# Patient Record
Sex: MALE | Race: WHITE | Employment: OTHER
[De-identification: names, ages, dates, MRNs, and addresses within clinical notes are randomized per-mention and may not be internally consistent; named-entity substitution may affect disease eponyms.]

---

## 2017-01-26 PROBLEM — C61 PROSTATE CANCER (HCC): Status: ACTIVE | Noted: 2017-01-26

## 2017-01-30 ENCOUNTER — TELEPHONE (OUTPATIENT)
Dept: FAMILY MEDICINE CLINIC | Facility: CLINIC | Age: 62
End: 2017-01-30

## 2017-03-10 RX ORDER — OXYCODONE HYDROCHLORIDE AND ACETAMINOPHEN 5; 325 MG/1; MG/1
TABLET ORAL
Qty: 20 TABLET | Refills: 0 | Status: SHIPPED | OUTPATIENT
Start: 2017-03-10 | End: 2017-06-30

## 2017-06-30 ENCOUNTER — OFFICE VISIT (OUTPATIENT)
Dept: FAMILY MEDICINE CLINIC | Age: 62
End: 2017-06-30
Payer: COMMERCIAL

## 2017-06-30 VITALS
SYSTOLIC BLOOD PRESSURE: 150 MMHG | DIASTOLIC BLOOD PRESSURE: 84 MMHG | HEART RATE: 84 BPM | BODY MASS INDEX: 28.04 KG/M2 | WEIGHT: 179 LBS

## 2017-06-30 DIAGNOSIS — M54.50 LOW BACK PAIN WITHOUT SCIATICA, UNSPECIFIED BACK PAIN LATERALITY, UNSPECIFIED CHRONICITY: ICD-10-CM

## 2017-06-30 DIAGNOSIS — R09.1 PLEURITIS: Primary | ICD-10-CM

## 2017-06-30 LAB
BILIRUBIN, POC: ABNORMAL
BLOOD URINE, POC: ABNORMAL
CLARITY, POC: ABNORMAL
COLOR, POC: YELLOW
GLUCOSE URINE, POC: ABNORMAL
KETONES, POC: ABNORMAL
LEUKOCYTE EST, POC: ABNORMAL
NITRITE, POC: ABNORMAL
PH, POC: 5
PROTEIN, POC: ABNORMAL
SPECIFIC GRAVITY, POC: >1.03
UROBILINOGEN, POC: 0.2

## 2017-06-30 PROCEDURE — 99213 OFFICE O/P EST LOW 20 MIN: CPT | Performed by: FAMILY MEDICINE

## 2017-06-30 PROCEDURE — 81003 URINALYSIS AUTO W/O SCOPE: CPT | Performed by: FAMILY MEDICINE

## 2017-06-30 RX ORDER — PREDNISONE 20 MG/1
TABLET ORAL
Qty: 20 TABLET | Refills: 0 | Status: SHIPPED | OUTPATIENT
Start: 2017-06-30 | End: 2018-02-05 | Stop reason: ALTCHOICE

## 2017-06-30 ASSESSMENT — ENCOUNTER SYMPTOMS
ABDOMINAL PAIN: 0
SHORTNESS OF BREATH: 0
BACK PAIN: 0
DIARRHEA: 0
WHEEZING: 0
CONSTIPATION: 0
BLOOD IN STOOL: 0
COUGH: 0

## 2017-06-30 ASSESSMENT — PATIENT HEALTH QUESTIONNAIRE - PHQ9
2. FEELING DOWN, DEPRESSED OR HOPELESS: 0
SUM OF ALL RESPONSES TO PHQ QUESTIONS 1-9: 0
1. LITTLE INTEREST OR PLEASURE IN DOING THINGS: 0
SUM OF ALL RESPONSES TO PHQ9 QUESTIONS 1 & 2: 0

## 2017-08-14 ENCOUNTER — TELEPHONE (OUTPATIENT)
Dept: FAMILY MEDICINE CLINIC | Age: 62
End: 2017-08-14

## 2017-08-16 ENCOUNTER — OFFICE VISIT (OUTPATIENT)
Dept: FAMILY MEDICINE CLINIC | Age: 62
End: 2017-08-16
Payer: COMMERCIAL

## 2017-08-16 VITALS
BODY MASS INDEX: 28.35 KG/M2 | WEIGHT: 181 LBS | DIASTOLIC BLOOD PRESSURE: 90 MMHG | SYSTOLIC BLOOD PRESSURE: 128 MMHG | HEART RATE: 88 BPM

## 2017-08-16 DIAGNOSIS — Z12.11 COLON CANCER SCREENING: ICD-10-CM

## 2017-08-16 DIAGNOSIS — F32.A DEPRESSION, UNSPECIFIED DEPRESSION TYPE: ICD-10-CM

## 2017-08-16 DIAGNOSIS — I10 ESSENTIAL HYPERTENSION: Primary | ICD-10-CM

## 2017-08-16 PROCEDURE — 99213 OFFICE O/P EST LOW 20 MIN: CPT | Performed by: FAMILY MEDICINE

## 2017-08-16 RX ORDER — ESCITALOPRAM OXALATE 10 MG/1
10 TABLET, FILM COATED ORAL DAILY
Qty: 30 TABLET | Refills: 3 | Status: SHIPPED | OUTPATIENT
Start: 2017-08-16 | End: 2018-02-05 | Stop reason: SDUPTHER

## 2017-08-16 RX ORDER — OXYCODONE HYDROCHLORIDE AND ACETAMINOPHEN 5; 325 MG/1; MG/1
1 TABLET ORAL EVERY 6 HOURS PRN
Qty: 50 TABLET | Refills: 0 | Status: SHIPPED | OUTPATIENT
Start: 2017-08-16 | End: 2018-04-10 | Stop reason: SDUPTHER

## 2017-08-16 ASSESSMENT — ENCOUNTER SYMPTOMS
SHORTNESS OF BREATH: 0
ABDOMINAL PAIN: 0
BLOOD IN STOOL: 0
COUGH: 0
CONSTIPATION: 0
WHEEZING: 0
BACK PAIN: 1
DIARRHEA: 0

## 2017-08-18 RX ORDER — ALBUTEROL SULFATE 90 UG/1
AEROSOL, METERED RESPIRATORY (INHALATION)
Qty: 18 INHALER | Refills: 5 | Status: SHIPPED | OUTPATIENT
Start: 2017-08-18 | End: 2018-02-05 | Stop reason: SDUPTHER

## 2017-10-02 RX ORDER — OXYCODONE HYDROCHLORIDE AND ACETAMINOPHEN 5; 325 MG/1; MG/1
TABLET ORAL
Qty: 42 TABLET | Refills: 0 | Status: SHIPPED | OUTPATIENT
Start: 2017-10-02 | End: 2017-12-05 | Stop reason: SDUPTHER

## 2017-10-02 NOTE — TELEPHONE ENCOUNTER
Last refill 8- needs [facility]   Health Maintenance   Topic Date Due    HIV screen  01/17/1970    Diabetes screen  01/17/1995    Colon cancer screen colonoscopy  01/17/2005    Zostavax vaccine  01/17/2015    Flu vaccine (1) 09/01/2017    Lipid screen  08/11/2021    DTaP/Tdap/Td vaccine (2 - Td) 08/02/2026    Pneumococcal med risk  Completed       No results found for: LABA1C          ( goal A1C is < 7)   No results found for: LABMICR  LDL Cholesterol (mg/dL)   Date Value   08/11/2016 147 (H)       (goal LDL is <100)   AST (U/L)   Date Value   08/11/2016 19     ALT (U/L)   Date Value   08/11/2016 12     BUN (mg/dL)   Date Value   01/27/2017 12     BP Readings from Last 3 Encounters:   08/16/17 (!) 128/90   06/30/17 (!) 150/84   01/27/17 131/76          (goal 120/80)    All Future Testing planned in [facility]      Next Visit Date:  No future appointments.          Patient Active Problem List:     Allergic rhinitis     Hepatitis C     Dyslipidemia     Hypertension     Erectile dysfunction     Asthma     Hx of rotator cuff surgery     Depression     1/26/17 RALP

## 2017-12-05 RX ORDER — OXYCODONE HYDROCHLORIDE AND ACETAMINOPHEN 5; 325 MG/1; MG/1
TABLET ORAL
Qty: 42 TABLET | Refills: 0 | Status: SHIPPED | OUTPATIENT
Start: 2017-12-05 | End: 2018-02-05 | Stop reason: SDUPTHER

## 2017-12-05 NOTE — TELEPHONE ENCOUNTER
Last refill 8-   Health Maintenance   Topic Date Due    HIV screen  01/17/1970    Diabetes screen  01/17/1995    Colon cancer screen colonoscopy  01/17/2005    Smoker: low dose lung CT screening  01/17/2010    Zostavax vaccine  01/17/2015    Flu vaccine (1) 09/01/2017    Lipid screen  08/11/2021    DTaP/Tdap/Td vaccine (2 - Td) 08/02/2026    Pneumococcal med risk  Completed       No results found for: LABA1C          ( goal A1C is < 7)   No results found for: LABMICR  LDL Cholesterol (mg/dL)   Date Value   08/11/2016 147 (H)       (goal LDL is <100)   AST (U/L)   Date Value   08/11/2016 19     ALT (U/L)   Date Value   08/11/2016 12     BUN (mg/dL)   Date Value   01/27/2017 12     BP Readings from Last 3 Encounters:   08/16/17 (!) 128/90   06/30/17 (!) 150/84   01/27/17 131/76          (goal 120/80)    All Future Testing planned in [facility]      Next Visit Date:  No future appointments.          Patient Active Problem List:     Allergic rhinitis     Hepatitis C     Dyslipidemia     Hypertension     Erectile dysfunction     Asthma     Hx of rotator cuff surgery     Depression     1/26/17 RALP

## 2018-02-05 ENCOUNTER — OFFICE VISIT (OUTPATIENT)
Dept: FAMILY MEDICINE CLINIC | Age: 63
End: 2018-02-05
Payer: COMMERCIAL

## 2018-02-05 VITALS
SYSTOLIC BLOOD PRESSURE: 182 MMHG | WEIGHT: 189 LBS | HEIGHT: 67 IN | HEART RATE: 80 BPM | BODY MASS INDEX: 29.66 KG/M2 | DIASTOLIC BLOOD PRESSURE: 90 MMHG

## 2018-02-05 DIAGNOSIS — F32.A DEPRESSION, UNSPECIFIED DEPRESSION TYPE: ICD-10-CM

## 2018-02-05 DIAGNOSIS — R06.09 DYSPNEA ON EXERTION: Primary | ICD-10-CM

## 2018-02-05 DIAGNOSIS — J44.9 CHRONIC OBSTRUCTIVE PULMONARY DISEASE, UNSPECIFIED COPD TYPE (HCC): ICD-10-CM

## 2018-02-05 DIAGNOSIS — E78.5 DYSLIPIDEMIA: ICD-10-CM

## 2018-02-05 DIAGNOSIS — I10 ESSENTIAL HYPERTENSION: ICD-10-CM

## 2018-02-05 DIAGNOSIS — G89.4 CHRONIC PAIN SYNDROME: ICD-10-CM

## 2018-02-05 DIAGNOSIS — Z12.5 PROSTATE CANCER SCREENING: ICD-10-CM

## 2018-02-05 PROCEDURE — 99214 OFFICE O/P EST MOD 30 MIN: CPT | Performed by: FAMILY MEDICINE

## 2018-02-05 RX ORDER — ESCITALOPRAM OXALATE 10 MG/1
TABLET ORAL
Qty: 30 TABLET | Refills: 11 | Status: SHIPPED | OUTPATIENT
Start: 2018-02-05 | End: 2019-03-05 | Stop reason: SDUPTHER

## 2018-02-05 RX ORDER — PREDNISONE 20 MG/1
TABLET ORAL
Qty: 20 TABLET | Refills: 0 | Status: SHIPPED | OUTPATIENT
Start: 2018-02-05 | End: 2018-02-19 | Stop reason: ALTCHOICE

## 2018-02-05 RX ORDER — OXYCODONE HYDROCHLORIDE AND ACETAMINOPHEN 5; 325 MG/1; MG/1
TABLET ORAL
Qty: 60 TABLET | Refills: 0 | Status: SHIPPED | OUTPATIENT
Start: 2018-02-05 | End: 2018-03-08 | Stop reason: SDUPTHER

## 2018-02-05 RX ORDER — ALBUTEROL SULFATE 90 UG/1
AEROSOL, METERED RESPIRATORY (INHALATION)
Qty: 18 INHALER | Refills: 5 | Status: SHIPPED | OUTPATIENT
Start: 2018-02-05 | End: 2018-08-13 | Stop reason: SDUPTHER

## 2018-02-05 ASSESSMENT — ENCOUNTER SYMPTOMS
DIARRHEA: 0
ABDOMINAL PAIN: 0
CHEST TIGHTNESS: 1
CONSTIPATION: 0
WHEEZING: 1
SHORTNESS OF BREATH: 1
COUGH: 0
BACK PAIN: 0
BLOOD IN STOOL: 0

## 2018-02-14 ENCOUNTER — HOSPITAL ENCOUNTER (OUTPATIENT)
Dept: NON INVASIVE DIAGNOSTICS | Age: 63
Discharge: HOME | End: 2018-02-14
Payer: COMMERCIAL

## 2018-02-14 ENCOUNTER — HOSPITAL ENCOUNTER (OUTPATIENT)
Age: 63
Discharge: HOME | End: 2018-02-14
Payer: COMMERCIAL

## 2018-02-14 DIAGNOSIS — E78.5 DYSLIPIDEMIA: ICD-10-CM

## 2018-02-14 DIAGNOSIS — Z12.5 PROSTATE CANCER SCREENING: ICD-10-CM

## 2018-02-14 DIAGNOSIS — R89.9 ABNORMAL LABORATORY TEST: Primary | ICD-10-CM

## 2018-02-14 DIAGNOSIS — R06.09 DYSPNEA ON EXERTION: ICD-10-CM

## 2018-02-14 DIAGNOSIS — I10 ESSENTIAL HYPERTENSION: ICD-10-CM

## 2018-02-14 LAB
ABSOLUTE EOS #: 0.1 K/UL (ref 0–0.4)
ABSOLUTE IMMATURE GRANULOCYTE: ABNORMAL K/UL (ref 0–0.3)
ABSOLUTE LYMPH #: 2.2 K/UL (ref 1–4.8)
ABSOLUTE MONO #: 0.7 K/UL (ref 0.2–0.8)
ALBUMIN SERPL-MCNC: 4.9 G/DL (ref 3.5–5.2)
ALBUMIN/GLOBULIN RATIO: ABNORMAL (ref 1–2.5)
ALP BLD-CCNC: 71 U/L (ref 40–129)
ALT SERPL-CCNC: 14 U/L (ref 5–41)
ANION GAP SERPL CALCULATED.3IONS-SCNC: 12 MMOL/L (ref 9–17)
AST SERPL-CCNC: 14 U/L
BASOPHILS # BLD: 0 % (ref 0–2)
BASOPHILS ABSOLUTE: 0 K/UL (ref 0–0.2)
BILIRUB SERPL-MCNC: 0.65 MG/DL (ref 0.3–1.2)
BUN BLDV-MCNC: 18 MG/DL (ref 8–23)
BUN/CREAT BLD: 20 (ref 9–20)
CALCIUM SERPL-MCNC: 9.7 MG/DL (ref 8.6–10.4)
CHLORIDE BLD-SCNC: 100 MMOL/L (ref 98–107)
CHOLESTEROL/HDL RATIO: 4.5
CHOLESTEROL: 281 MG/DL
CO2: 29 MMOL/L (ref 20–31)
CREAT SERPL-MCNC: 0.88 MG/DL (ref 0.7–1.2)
DIFFERENTIAL TYPE: ABNORMAL
EOSINOPHILS RELATIVE PERCENT: 2 % (ref 1–4)
FOLATE: 5.5 NG/ML
GFR AFRICAN AMERICAN: >60 ML/MIN
GFR NON-AFRICAN AMERICAN: >60 ML/MIN
GFR SERPL CREATININE-BSD FRML MDRD: ABNORMAL ML/MIN/{1.73_M2}
GFR SERPL CREATININE-BSD FRML MDRD: ABNORMAL ML/MIN/{1.73_M2}
GLUCOSE BLD-MCNC: 106 MG/DL (ref 70–99)
HCT VFR BLD CALC: 48.4 % (ref 41–53)
HDLC SERPL-MCNC: 62 MG/DL
HEMOGLOBIN: 16.4 G/DL (ref 13.5–17.5)
IMMATURE GRANULOCYTES: ABNORMAL %
LDL CHOLESTEROL: 166 MG/DL (ref 0–130)
LV EF: 60 %
LVEF MODALITY: NORMAL
LYMPHOCYTES # BLD: 25 % (ref 24–44)
MCH RBC QN AUTO: 36 PG (ref 26–34)
MCHC RBC AUTO-ENTMCNC: 33.8 G/DL (ref 31–37)
MCV RBC AUTO: 106.2 FL (ref 80–100)
MONOCYTES # BLD: 8 % (ref 1–7)
NRBC AUTOMATED: ABNORMAL PER 100 WBC
PDW BLD-RTO: 13.8 % (ref 11.5–14.5)
PLATELET # BLD: 217 K/UL (ref 130–400)
PLATELET ESTIMATE: ABNORMAL
PMV BLD AUTO: 7.6 FL (ref 6–12)
POTASSIUM SERPL-SCNC: 4.1 MMOL/L (ref 3.7–5.3)
PROSTATE SPECIFIC ANTIGEN: 0.01 UG/L
RBC # BLD: 4.56 M/UL (ref 4.5–5.9)
RBC # BLD: ABNORMAL 10*6/UL
SEG NEUTROPHILS: 65 % (ref 36–66)
SEGMENTED NEUTROPHILS ABSOLUTE COUNT: 5.9 K/UL (ref 1.8–7.7)
SODIUM BLD-SCNC: 141 MMOL/L (ref 135–144)
TOTAL PROTEIN: 8.1 G/DL (ref 6.4–8.3)
TRIGL SERPL-MCNC: 266 MG/DL
VITAMIN B-12: 229 PG/ML (ref 232–1245)
VLDLC SERPL CALC-MCNC: ABNORMAL MG/DL (ref 1–30)
WBC # BLD: 9 K/UL (ref 3.5–11)
WBC # BLD: ABNORMAL 10*3/UL

## 2018-02-14 PROCEDURE — 85025 COMPLETE CBC W/AUTO DIFF WBC: CPT

## 2018-02-14 PROCEDURE — 82607 VITAMIN B-12: CPT

## 2018-02-14 PROCEDURE — 80053 COMPREHEN METABOLIC PANEL: CPT

## 2018-02-14 PROCEDURE — 36415 COLL VENOUS BLD VENIPUNCTURE: CPT

## 2018-02-14 PROCEDURE — 80061 LIPID PANEL: CPT

## 2018-02-14 PROCEDURE — 82746 ASSAY OF FOLIC ACID SERUM: CPT

## 2018-02-14 PROCEDURE — G0103 PSA SCREENING: HCPCS

## 2018-02-14 PROCEDURE — 93306 TTE W/DOPPLER COMPLETE: CPT

## 2018-02-19 ENCOUNTER — OFFICE VISIT (OUTPATIENT)
Dept: FAMILY MEDICINE CLINIC | Age: 63
End: 2018-02-19
Payer: COMMERCIAL

## 2018-02-19 VITALS
DIASTOLIC BLOOD PRESSURE: 88 MMHG | SYSTOLIC BLOOD PRESSURE: 160 MMHG | HEART RATE: 62 BPM | BODY MASS INDEX: 29.13 KG/M2 | WEIGHT: 186 LBS

## 2018-02-19 DIAGNOSIS — E78.5 DYSLIPIDEMIA: ICD-10-CM

## 2018-02-19 DIAGNOSIS — I10 ESSENTIAL HYPERTENSION: Primary | ICD-10-CM

## 2018-02-19 PROCEDURE — 99213 OFFICE O/P EST LOW 20 MIN: CPT | Performed by: FAMILY MEDICINE

## 2018-02-19 ASSESSMENT — ENCOUNTER SYMPTOMS
COUGH: 0
DIARRHEA: 0
ABDOMINAL PAIN: 0
CONSTIPATION: 0
BLOOD IN STOOL: 0
SHORTNESS OF BREATH: 0
WHEEZING: 0
BACK PAIN: 0

## 2018-03-08 DIAGNOSIS — G89.4 CHRONIC PAIN SYNDROME: ICD-10-CM

## 2018-03-08 RX ORDER — OXYCODONE HYDROCHLORIDE AND ACETAMINOPHEN 5; 325 MG/1; MG/1
TABLET ORAL
Qty: 60 TABLET | Refills: 0 | Status: SHIPPED | OUTPATIENT
Start: 2018-03-08 | End: 2018-04-05

## 2018-05-10 DIAGNOSIS — G89.4 CHRONIC PAIN SYNDROME: ICD-10-CM

## 2018-05-10 RX ORDER — OXYCODONE HYDROCHLORIDE AND ACETAMINOPHEN 5; 325 MG/1; MG/1
1 TABLET ORAL EVERY 6 HOURS PRN
Qty: 50 TABLET | Refills: 0 | Status: SHIPPED | OUTPATIENT
Start: 2018-05-10 | End: 2018-06-11 | Stop reason: SDUPTHER

## 2018-05-21 ENCOUNTER — OFFICE VISIT (OUTPATIENT)
Dept: FAMILY MEDICINE CLINIC | Age: 63
End: 2018-05-21
Payer: COMMERCIAL

## 2018-05-21 VITALS
DIASTOLIC BLOOD PRESSURE: 80 MMHG | WEIGHT: 176 LBS | SYSTOLIC BLOOD PRESSURE: 150 MMHG | HEART RATE: 52 BPM | BODY MASS INDEX: 27.57 KG/M2

## 2018-05-21 DIAGNOSIS — Z12.11 COLON CANCER SCREENING: ICD-10-CM

## 2018-05-21 DIAGNOSIS — E78.5 DYSLIPIDEMIA: ICD-10-CM

## 2018-05-21 DIAGNOSIS — J44.9 CHRONIC OBSTRUCTIVE PULMONARY DISEASE, UNSPECIFIED COPD TYPE (HCC): ICD-10-CM

## 2018-05-21 DIAGNOSIS — I10 ESSENTIAL HYPERTENSION: Primary | ICD-10-CM

## 2018-05-21 PROCEDURE — 99213 OFFICE O/P EST LOW 20 MIN: CPT | Performed by: FAMILY MEDICINE

## 2018-05-21 RX ORDER — LOSARTAN POTASSIUM 50 MG/1
50 TABLET ORAL DAILY
Qty: 30 TABLET | Refills: 5 | Status: SHIPPED | OUTPATIENT
Start: 2018-05-21 | End: 2018-12-31 | Stop reason: SDUPTHER

## 2018-05-21 RX ORDER — ATORVASTATIN CALCIUM 10 MG/1
10 TABLET, FILM COATED ORAL DAILY
Qty: 30 TABLET | Refills: 5 | Status: SHIPPED | OUTPATIENT
Start: 2018-05-21 | End: 2018-12-31 | Stop reason: SDUPTHER

## 2018-05-21 ASSESSMENT — ENCOUNTER SYMPTOMS
BLOOD IN STOOL: 0
WHEEZING: 0
CONSTIPATION: 0
SHORTNESS OF BREATH: 0
DIARRHEA: 0
ABDOMINAL PAIN: 0
BACK PAIN: 0
COUGH: 0

## 2018-06-11 DIAGNOSIS — G89.4 CHRONIC PAIN SYNDROME: ICD-10-CM

## 2018-06-11 RX ORDER — OXYCODONE HYDROCHLORIDE AND ACETAMINOPHEN 5; 325 MG/1; MG/1
1 TABLET ORAL EVERY 6 HOURS PRN
Qty: 50 TABLET | Refills: 0 | Status: SHIPPED | OUTPATIENT
Start: 2018-06-11 | End: 2018-07-12 | Stop reason: SDUPTHER

## 2018-07-12 DIAGNOSIS — G89.4 CHRONIC PAIN SYNDROME: ICD-10-CM

## 2018-07-12 RX ORDER — OXYCODONE HYDROCHLORIDE AND ACETAMINOPHEN 5; 325 MG/1; MG/1
1 TABLET ORAL EVERY 6 HOURS PRN
Qty: 50 TABLET | Refills: 0 | Status: SHIPPED | OUTPATIENT
Start: 2018-07-12 | End: 2018-08-11

## 2018-08-13 ENCOUNTER — TELEPHONE (OUTPATIENT)
Dept: FAMILY MEDICINE CLINIC | Age: 63
End: 2018-08-13

## 2018-08-13 DIAGNOSIS — J44.9 CHRONIC OBSTRUCTIVE PULMONARY DISEASE, UNSPECIFIED COPD TYPE (HCC): ICD-10-CM

## 2018-08-13 DIAGNOSIS — G89.4 CHRONIC PAIN SYNDROME: ICD-10-CM

## 2018-08-13 RX ORDER — OXYCODONE HYDROCHLORIDE AND ACETAMINOPHEN 5; 325 MG/1; MG/1
1 TABLET ORAL EVERY 6 HOURS PRN
Qty: 50 TABLET | Refills: 0 | Status: CANCELLED | OUTPATIENT
Start: 2018-08-13 | End: 2018-09-12

## 2018-08-14 NOTE — TELEPHONE ENCOUNTER
Health Maintenance   Topic Date Due    HIV screen  01/17/1970    Diabetes screen  01/17/1995    Shingles Vaccine (1 of 2 - 2 Dose Series) 01/17/2005    Colon cancer screen colonoscopy  01/17/2005    Low dose CT lung screening  01/17/2010    Flu vaccine (1) 09/01/2018    Potassium monitoring  02/14/2019    Creatinine monitoring  02/14/2019    Lipid screen  02/14/2023    DTaP/Tdap/Td vaccine (2 - Td) 08/02/2026    Pneumococcal med risk  Completed       No results found for: LABA1C          ( goal A1C is < 7)   No results found for: LABMICR  LDL Cholesterol (mg/dL)   Date Value   02/14/2018 166 (H)       (goal LDL is <100)   AST (U/L)   Date Value   02/14/2018 14     ALT (U/L)   Date Value   02/14/2018 14     BUN (mg/dL)   Date Value   02/14/2018 18     BP Readings from Last 3 Encounters:   05/21/18 (!) 150/80   02/19/18 (!) 160/88   02/05/18 (!) 182/90          (goal 120/80)    All Future Testing planned in [facility]  Lab Frequency Next Occurrence   Vitamin B12 & Folate Once 02/14/2018       Next Visit Date:  Future Appointments  Date Time Provider Ladan Horton   8/20/2018 11:30 AM Dee Dee Cornelius MD [facility] [place] [address] [facility]            Patient Active Problem List:     Allergic rhinitis     Hepatitis C     Dyslipidemia     Hypertension     Erectile dysfunction     Asthma     Hx of rotator cuff surgery     Depression     1/26/17 RALP     Chronic obstructive pulmonary disease ([facility] [address]

## 2018-12-28 DIAGNOSIS — Z12.11 COLON CANCER SCREENING: Primary | ICD-10-CM

## 2019-01-02 RX ORDER — ATORVASTATIN CALCIUM 10 MG/1
TABLET, FILM COATED ORAL
Qty: 30 TABLET | Refills: 11 | Status: SHIPPED | OUTPATIENT
Start: 2019-01-02 | End: 2020-01-13

## 2019-01-02 RX ORDER — LOSARTAN POTASSIUM 50 MG/1
TABLET ORAL
Qty: 30 TABLET | Refills: 11 | Status: SHIPPED | OUTPATIENT
Start: 2019-01-02 | End: 2020-01-13

## 2019-01-07 RX ORDER — POLYETHYLENE GLYCOL 3350 17 G/17G
POWDER, FOR SOLUTION ORAL
Qty: 255 G | Refills: 0 | Status: SHIPPED | OUTPATIENT
Start: 2019-01-07 | End: 2019-01-27

## 2019-02-04 DIAGNOSIS — J44.9 CHRONIC OBSTRUCTIVE PULMONARY DISEASE, UNSPECIFIED COPD TYPE (HCC): ICD-10-CM

## 2019-02-04 RX ORDER — ALBUTEROL SULFATE 90 UG/1
AEROSOL, METERED RESPIRATORY (INHALATION)
Qty: 18 G | Refills: 5 | Status: SHIPPED | OUTPATIENT
Start: 2019-02-04 | End: 2019-06-05 | Stop reason: SDUPTHER

## 2019-02-19 ENCOUNTER — ANESTHESIA EVENT (OUTPATIENT)
Dept: OPERATING ROOM | Age: 64
End: 2019-02-19
Payer: COMMERCIAL

## 2019-02-19 ENCOUNTER — TELEPHONE (OUTPATIENT)
Dept: GASTROENTEROLOGY | Age: 64
End: 2019-02-19

## 2019-02-20 ENCOUNTER — HOSPITAL ENCOUNTER (OUTPATIENT)
Age: 64
Setting detail: OUTPATIENT SURGERY
Discharge: HOME | End: 2019-02-20
Attending: INTERNAL MEDICINE | Admitting: INTERNAL MEDICINE
Payer: COMMERCIAL

## 2019-02-20 ENCOUNTER — ANESTHESIA (OUTPATIENT)
Dept: OPERATING ROOM | Age: 64
End: 2019-02-20
Payer: COMMERCIAL

## 2019-02-20 VITALS
SYSTOLIC BLOOD PRESSURE: 180 MMHG | HEIGHT: 67 IN | TEMPERATURE: 97.3 F | RESPIRATION RATE: 15 BRPM | WEIGHT: 183 LBS | DIASTOLIC BLOOD PRESSURE: 89 MMHG | BODY MASS INDEX: 28.72 KG/M2 | HEART RATE: 56 BPM | OXYGEN SATURATION: 97 %

## 2019-02-20 VITALS — SYSTOLIC BLOOD PRESSURE: 143 MMHG | DIASTOLIC BLOOD PRESSURE: 45 MMHG

## 2019-02-20 PROCEDURE — 7100000011 HC PHASE II RECOVERY - ADDTL 15 MIN: Performed by: INTERNAL MEDICINE

## 2019-02-20 PROCEDURE — 2580000003 HC RX 258: Performed by: ANESTHESIOLOGY

## 2019-02-20 PROCEDURE — 3700000001 HC ADD 15 MINUTES (ANESTHESIA): Performed by: INTERNAL MEDICINE

## 2019-02-20 PROCEDURE — 7100000010 HC PHASE II RECOVERY - FIRST 15 MIN: Performed by: INTERNAL MEDICINE

## 2019-02-20 PROCEDURE — 3609010600 HC COLONOSCOPY POLYPECTOMY SNARE/COLD BIOPSY: Performed by: INTERNAL MEDICINE

## 2019-02-20 PROCEDURE — 6360000002 HC RX W HCPCS: Performed by: NURSE ANESTHETIST, CERTIFIED REGISTERED

## 2019-02-20 PROCEDURE — 88305 TISSUE EXAM BY PATHOLOGIST: CPT

## 2019-02-20 PROCEDURE — 45380 COLONOSCOPY AND BIOPSY: CPT | Performed by: INTERNAL MEDICINE

## 2019-02-20 PROCEDURE — 2580000003 HC RX 258: Performed by: NURSE ANESTHETIST, CERTIFIED REGISTERED

## 2019-02-20 PROCEDURE — C1773 RET DEV, INSERTABLE: HCPCS | Performed by: INTERNAL MEDICINE

## 2019-02-20 PROCEDURE — 2709999900 HC NON-CHARGEABLE SUPPLY: Performed by: INTERNAL MEDICINE

## 2019-02-20 PROCEDURE — 2500000003 HC RX 250 WO HCPCS: Performed by: NURSE ANESTHETIST, CERTIFIED REGISTERED

## 2019-02-20 PROCEDURE — 45385 COLONOSCOPY W/LESION REMOVAL: CPT | Performed by: INTERNAL MEDICINE

## 2019-02-20 PROCEDURE — 3700000000 HC ANESTHESIA ATTENDED CARE: Performed by: INTERNAL MEDICINE

## 2019-02-20 RX ORDER — HYDROMORPHONE HCL 110MG/55ML
0.5 PATIENT CONTROLLED ANALGESIA SYRINGE INTRAVENOUS EVERY 5 MIN PRN
Status: DISCONTINUED | OUTPATIENT
Start: 2019-02-20 | End: 2019-02-20 | Stop reason: HOSPADM

## 2019-02-20 RX ORDER — DIPHENHYDRAMINE HYDROCHLORIDE 50 MG/ML
12.5 INJECTION INTRAMUSCULAR; INTRAVENOUS
Status: DISCONTINUED | OUTPATIENT
Start: 2019-02-20 | End: 2019-02-20 | Stop reason: HOSPADM

## 2019-02-20 RX ORDER — LABETALOL HYDROCHLORIDE 5 MG/ML
5 INJECTION, SOLUTION INTRAVENOUS EVERY 10 MIN PRN
Status: DISCONTINUED | OUTPATIENT
Start: 2019-02-20 | End: 2019-02-20 | Stop reason: HOSPADM

## 2019-02-20 RX ORDER — LIDOCAINE HYDROCHLORIDE 20 MG/ML
INJECTION, SOLUTION EPIDURAL; INFILTRATION; INTRACAUDAL; PERINEURAL PRN
Status: DISCONTINUED | OUTPATIENT
Start: 2019-02-20 | End: 2019-02-20 | Stop reason: SDUPTHER

## 2019-02-20 RX ORDER — OXYCODONE HYDROCHLORIDE AND ACETAMINOPHEN 5; 325 MG/1; MG/1
1 TABLET ORAL PRN
Status: DISCONTINUED | OUTPATIENT
Start: 2019-02-20 | End: 2019-02-20 | Stop reason: HOSPADM

## 2019-02-20 RX ORDER — FENTANYL CITRATE 50 UG/ML
25 INJECTION, SOLUTION INTRAMUSCULAR; INTRAVENOUS EVERY 5 MIN PRN
Status: DISCONTINUED | OUTPATIENT
Start: 2019-02-20 | End: 2019-02-20 | Stop reason: HOSPADM

## 2019-02-20 RX ORDER — HYDRALAZINE HYDROCHLORIDE 20 MG/ML
5 INJECTION INTRAMUSCULAR; INTRAVENOUS EVERY 10 MIN PRN
Status: DISCONTINUED | OUTPATIENT
Start: 2019-02-20 | End: 2019-02-20 | Stop reason: HOSPADM

## 2019-02-20 RX ORDER — PROPOFOL 10 MG/ML
INJECTION, EMULSION INTRAVENOUS PRN
Status: DISCONTINUED | OUTPATIENT
Start: 2019-02-20 | End: 2019-02-20 | Stop reason: SDUPTHER

## 2019-02-20 RX ORDER — ONDANSETRON 2 MG/ML
4 INJECTION INTRAMUSCULAR; INTRAVENOUS
Status: DISCONTINUED | OUTPATIENT
Start: 2019-02-20 | End: 2019-02-20 | Stop reason: HOSPADM

## 2019-02-20 RX ORDER — SODIUM CHLORIDE, SODIUM LACTATE, POTASSIUM CHLORIDE, CALCIUM CHLORIDE 600; 310; 30; 20 MG/100ML; MG/100ML; MG/100ML; MG/100ML
INJECTION, SOLUTION INTRAVENOUS CONTINUOUS PRN
Status: DISCONTINUED | OUTPATIENT
Start: 2019-02-20 | End: 2019-02-20 | Stop reason: SDUPTHER

## 2019-02-20 RX ORDER — LIDOCAINE HYDROCHLORIDE 10 MG/ML
1 INJECTION, SOLUTION EPIDURAL; INFILTRATION; INTRACAUDAL; PERINEURAL
Status: DISCONTINUED | OUTPATIENT
Start: 2019-02-21 | End: 2019-02-20 | Stop reason: HOSPADM

## 2019-02-20 RX ORDER — SODIUM CHLORIDE, SODIUM LACTATE, POTASSIUM CHLORIDE, CALCIUM CHLORIDE 600; 310; 30; 20 MG/100ML; MG/100ML; MG/100ML; MG/100ML
INJECTION, SOLUTION INTRAVENOUS CONTINUOUS
Status: DISCONTINUED | OUTPATIENT
Start: 2019-02-21 | End: 2019-02-20 | Stop reason: HOSPADM

## 2019-02-20 RX ORDER — DEXAMETHASONE SODIUM PHOSPHATE 10 MG/ML
4 INJECTION INTRAMUSCULAR; INTRAVENOUS
Status: DISCONTINUED | OUTPATIENT
Start: 2019-02-20 | End: 2019-02-20 | Stop reason: HOSPADM

## 2019-02-20 RX ORDER — OXYCODONE HYDROCHLORIDE AND ACETAMINOPHEN 5; 325 MG/1; MG/1
2 TABLET ORAL PRN
Status: DISCONTINUED | OUTPATIENT
Start: 2019-02-20 | End: 2019-02-20 | Stop reason: HOSPADM

## 2019-02-20 RX ORDER — MEPERIDINE HYDROCHLORIDE 50 MG/ML
12.5 INJECTION INTRAMUSCULAR; INTRAVENOUS; SUBCUTANEOUS EVERY 5 MIN PRN
Status: DISCONTINUED | OUTPATIENT
Start: 2019-02-20 | End: 2019-02-20 | Stop reason: HOSPADM

## 2019-02-20 RX ADMIN — PROPOFOL 20 MG: 10 INJECTION, EMULSION INTRAVENOUS at 09:57

## 2019-02-20 RX ADMIN — PROPOFOL 50 MG: 10 INJECTION, EMULSION INTRAVENOUS at 09:51

## 2019-02-20 RX ADMIN — SODIUM CHLORIDE, POTASSIUM CHLORIDE, SODIUM LACTATE AND CALCIUM CHLORIDE: 600; 310; 30; 20 INJECTION, SOLUTION INTRAVENOUS at 08:14

## 2019-02-20 RX ADMIN — PROPOFOL 20 MG: 10 INJECTION, EMULSION INTRAVENOUS at 09:53

## 2019-02-20 RX ADMIN — PROPOFOL 20 MG: 10 INJECTION, EMULSION INTRAVENOUS at 10:05

## 2019-02-20 RX ADMIN — PROPOFOL 20 MG: 10 INJECTION, EMULSION INTRAVENOUS at 10:01

## 2019-02-20 RX ADMIN — PROPOFOL 20 MG: 10 INJECTION, EMULSION INTRAVENOUS at 09:55

## 2019-02-20 RX ADMIN — PROPOFOL 50 MG: 10 INJECTION, EMULSION INTRAVENOUS at 09:50

## 2019-02-20 RX ADMIN — PROPOFOL 20 MG: 10 INJECTION, EMULSION INTRAVENOUS at 09:59

## 2019-02-20 RX ADMIN — LIDOCAINE HYDROCHLORIDE 100 MG: 20 INJECTION, SOLUTION EPIDURAL; INFILTRATION; INTRACAUDAL; PERINEURAL at 09:50

## 2019-02-20 RX ADMIN — PROPOFOL 20 MG: 10 INJECTION, EMULSION INTRAVENOUS at 10:03

## 2019-02-20 RX ADMIN — PROPOFOL 50 MG: 10 INJECTION, EMULSION INTRAVENOUS at 09:52

## 2019-02-20 RX ADMIN — SODIUM CHLORIDE, POTASSIUM CHLORIDE, SODIUM LACTATE AND CALCIUM CHLORIDE: 600; 310; 30; 20 INJECTION, SOLUTION INTRAVENOUS at 09:46

## 2019-02-20 ASSESSMENT — PAIN SCALES - GENERAL
PAINLEVEL_OUTOF10: 0
PAINLEVEL_OUTOF10: 0

## 2019-02-20 ASSESSMENT — PAIN - FUNCTIONAL ASSESSMENT: PAIN_FUNCTIONAL_ASSESSMENT: 0-10

## 2019-02-22 LAB — SURGICAL PATHOLOGY REPORT: NORMAL

## 2019-03-05 DIAGNOSIS — F32.A DEPRESSION, UNSPECIFIED DEPRESSION TYPE: ICD-10-CM

## 2019-03-05 RX ORDER — ESCITALOPRAM OXALATE 10 MG/1
TABLET ORAL
Qty: 30 TABLET | Refills: 11 | Status: SHIPPED | OUTPATIENT
Start: 2019-03-05 | End: 2020-03-06

## 2019-03-11 ENCOUNTER — TELEPHONE (OUTPATIENT)
Dept: GASTROENTEROLOGY | Age: 64
End: 2019-03-11

## 2019-06-05 DIAGNOSIS — J44.9 CHRONIC OBSTRUCTIVE PULMONARY DISEASE, UNSPECIFIED COPD TYPE (HCC): ICD-10-CM

## 2019-06-05 RX ORDER — ALBUTEROL SULFATE 90 UG/1
AEROSOL, METERED RESPIRATORY (INHALATION)
Qty: 18 G | Refills: 5 | Status: SHIPPED | OUTPATIENT
Start: 2019-06-05 | End: 2019-10-19 | Stop reason: SDUPTHER

## 2019-06-05 NOTE — TELEPHONE ENCOUNTER
Next Visit Date:  No future appointments.     Health Maintenance   Topic Date Due    HIV screen  01/17/1970    Diabetes screen  01/17/1995    Shingles Vaccine (1 of 2) 01/17/2005    Low dose CT lung screening  01/17/2010    Potassium monitoring  02/14/2019    Creatinine monitoring  02/14/2019    Flu vaccine (Season Ended) 09/01/2019    Lipid screen  02/14/2023    DTaP/Tdap/Td vaccine (2 - Td) 08/02/2026    Colon cancer screen colonoscopy  02/20/2029    Pneumococcal 0-64 years Vaccine  Completed       No results found for: LABA1C          ( goal A1C is < 7)   No results found for: LABMICR  LDL Cholesterol (mg/dL)   Date Value   02/14/2018 166 (H)   08/11/2016 147 (H)       (goal LDL is <100)   AST (U/L)   Date Value   02/14/2018 14     ALT (U/L)   Date Value   02/14/2018 14     BUN (mg/dL)   Date Value   02/14/2018 18     BP Readings from Last 3 Encounters:   02/20/19 (!) 143/45   02/20/19 (!) 180/89   05/21/18 (!) 150/80          (goal 120/80)    All Future Testing planned in [facility]  Lab Frequency Next Occurrence               Patient Active Problem List:     Allergic rhinitis     Hepatitis C     Dyslipidemia     Hypertension     Erectile dysfunction     Asthma     Hx of rotator cuff surgery     Depression     1/26/17 RALP     Chronic obstructive pulmonary disease ([address]

## 2019-10-19 DIAGNOSIS — J44.9 CHRONIC OBSTRUCTIVE PULMONARY DISEASE, UNSPECIFIED COPD TYPE (HCC): ICD-10-CM

## 2019-10-21 RX ORDER — ALBUTEROL SULFATE 90 UG/1
AEROSOL, METERED RESPIRATORY (INHALATION)
Qty: 18 G | Refills: 5 | Status: SHIPPED | OUTPATIENT
Start: 2019-10-21 | End: 2020-03-16

## 2019-11-19 ENCOUNTER — OFFICE VISIT (OUTPATIENT)
Dept: FAMILY MEDICINE CLINIC | Age: 64
End: 2019-11-19
Payer: COMMERCIAL

## 2019-11-19 ENCOUNTER — HOSPITAL ENCOUNTER (OUTPATIENT)
Age: 64
Setting detail: SPECIMEN
Discharge: HOME | End: 2019-11-19
Payer: COMMERCIAL

## 2019-11-19 VITALS
BODY MASS INDEX: 29.27 KG/M2 | HEART RATE: 60 BPM | OXYGEN SATURATION: 98 % | SYSTOLIC BLOOD PRESSURE: 142 MMHG | WEIGHT: 186.5 LBS | DIASTOLIC BLOOD PRESSURE: 80 MMHG | HEIGHT: 67 IN

## 2019-11-19 DIAGNOSIS — R73.01 IMPAIRED FASTING GLUCOSE: ICD-10-CM

## 2019-11-19 DIAGNOSIS — I10 ESSENTIAL HYPERTENSION: Primary | ICD-10-CM

## 2019-11-19 DIAGNOSIS — I10 ESSENTIAL HYPERTENSION: ICD-10-CM

## 2019-11-19 DIAGNOSIS — E78.5 DYSLIPIDEMIA: ICD-10-CM

## 2019-11-19 DIAGNOSIS — Z12.5 PROSTATE CANCER SCREENING: ICD-10-CM

## 2019-11-19 LAB
ABSOLUTE EOS #: 0.31 K/UL (ref 0–0.4)
ABSOLUTE IMMATURE GRANULOCYTE: 0 K/UL (ref 0–0.3)
ABSOLUTE LYMPH #: 1.99 K/UL (ref 1–4.8)
ABSOLUTE MONO #: 0.36 K/UL (ref 0.1–0.8)
ALBUMIN SERPL-MCNC: 4.4 G/DL (ref 3.5–5.2)
ALBUMIN/GLOBULIN RATIO: 1.4 (ref 1–2.5)
ALP BLD-CCNC: 67 U/L (ref 40–129)
ALT SERPL-CCNC: 17 U/L (ref 5–41)
ANION GAP SERPL CALCULATED.3IONS-SCNC: 13 MMOL/L (ref 9–17)
AST SERPL-CCNC: 22 U/L
BASOPHILS # BLD: 0 % (ref 0–2)
BASOPHILS ABSOLUTE: 0 K/UL (ref 0–0.2)
BILIRUB SERPL-MCNC: 0.5 MG/DL (ref 0.3–1.2)
BUN BLDV-MCNC: 13 MG/DL (ref 8–23)
BUN/CREAT BLD: NORMAL (ref 9–20)
CALCIUM SERPL-MCNC: 9 MG/DL (ref 8.6–10.4)
CHLORIDE BLD-SCNC: 107 MMOL/L (ref 98–107)
CHOLESTEROL/HDL RATIO: 3.7
CHOLESTEROL: 170 MG/DL
CO2: 24 MMOL/L (ref 20–31)
CREAT SERPL-MCNC: 0.74 MG/DL (ref 0.7–1.2)
DIFFERENTIAL TYPE: ABNORMAL
EOSINOPHILS RELATIVE PERCENT: 6 % (ref 1–4)
GFR AFRICAN AMERICAN: >60 ML/MIN
GFR NON-AFRICAN AMERICAN: >60 ML/MIN
GFR SERPL CREATININE-BSD FRML MDRD: NORMAL ML/MIN/{1.73_M2}
GFR SERPL CREATININE-BSD FRML MDRD: NORMAL ML/MIN/{1.73_M2}
GLUCOSE BLD-MCNC: 98 MG/DL (ref 70–99)
HCT VFR BLD CALC: 47.1 % (ref 40.7–50.3)
HDLC SERPL-MCNC: 46 MG/DL
HEMOGLOBIN: 15 G/DL (ref 13–17)
IMMATURE GRANULOCYTES: 0 %
LDL CHOLESTEROL: 99 MG/DL (ref 0–130)
LYMPHOCYTES # BLD: 39 % (ref 24–44)
MCH RBC QN AUTO: 35.4 PG (ref 25.2–33.5)
MCHC RBC AUTO-ENTMCNC: 31.8 G/DL (ref 28.4–34.8)
MCV RBC AUTO: 111.1 FL (ref 82.6–102.9)
MONOCYTES # BLD: 7 % (ref 1–7)
MORPHOLOGY: ABNORMAL
NRBC AUTOMATED: 0 PER 100 WBC
PDW BLD-RTO: 13.9 % (ref 11.8–14.4)
PLATELET # BLD: 182 K/UL (ref 138–453)
PLATELET ESTIMATE: ABNORMAL
PMV BLD AUTO: 10.5 FL (ref 8.1–13.5)
POTASSIUM SERPL-SCNC: 4.8 MMOL/L (ref 3.7–5.3)
PROSTATE SPECIFIC ANTIGEN: 0.03 UG/L
RBC # BLD: 4.24 M/UL (ref 4.21–5.77)
RBC # BLD: ABNORMAL 10*6/UL
SEG NEUTROPHILS: 48 % (ref 36–66)
SEGMENTED NEUTROPHILS ABSOLUTE COUNT: 2.44 K/UL (ref 1.8–7.7)
SODIUM BLD-SCNC: 144 MMOL/L (ref 135–144)
TOTAL PROTEIN: 7.5 G/DL (ref 6.4–8.3)
TRIGL SERPL-MCNC: 124 MG/DL
VLDLC SERPL CALC-MCNC: NORMAL MG/DL (ref 1–30)
WBC # BLD: 5.1 K/UL (ref 3.5–11.3)
WBC # BLD: ABNORMAL 10*3/UL

## 2019-11-19 PROCEDURE — 99213 OFFICE O/P EST LOW 20 MIN: CPT | Performed by: FAMILY MEDICINE

## 2019-11-19 ASSESSMENT — ENCOUNTER SYMPTOMS
BLOOD IN STOOL: 0
ABDOMINAL PAIN: 0
COUGH: 0
BACK PAIN: 0
CONSTIPATION: 0
SHORTNESS OF BREATH: 0
WHEEZING: 0
DIARRHEA: 0

## 2019-11-20 LAB
ESTIMATED AVERAGE GLUCOSE: 111 MG/DL
HBA1C MFR BLD: 5.5 % (ref 4–6)

## 2020-01-13 RX ORDER — LOSARTAN POTASSIUM 50 MG/1
TABLET ORAL
Qty: 30 TABLET | Refills: 11 | Status: SHIPPED | OUTPATIENT
Start: 2020-01-13 | End: 2021-04-13 | Stop reason: SDUPTHER

## 2020-01-13 RX ORDER — ATORVASTATIN CALCIUM 10 MG/1
TABLET, FILM COATED ORAL
Qty: 30 TABLET | Refills: 11 | Status: SHIPPED | OUTPATIENT
Start: 2020-01-13 | End: 2021-04-13 | Stop reason: SDUPTHER

## 2020-03-06 RX ORDER — ESCITALOPRAM OXALATE 10 MG/1
TABLET ORAL
Qty: 30 TABLET | Refills: 11 | Status: SHIPPED | OUTPATIENT
Start: 2020-03-06 | End: 2021-03-12

## 2020-03-16 RX ORDER — ALBUTEROL SULFATE 90 UG/1
AEROSOL, METERED RESPIRATORY (INHALATION)
Qty: 18 G | Refills: 5 | Status: SHIPPED | OUTPATIENT
Start: 2020-03-16 | End: 2020-12-16 | Stop reason: SDUPTHER

## 2020-03-20 ENCOUNTER — TELEPHONE (OUTPATIENT)
Dept: FAMILY MEDICINE CLINIC | Age: 65
End: 2020-03-20

## 2020-03-20 RX ORDER — ALBUTEROL SULFATE 90 UG/1
2 AEROSOL, METERED RESPIRATORY (INHALATION) EVERY 6 HOURS PRN
Qty: 1 INHALER | Refills: 3 | Status: SHIPPED | OUTPATIENT
Start: 2020-03-20 | End: 2020-07-24

## 2020-03-20 NOTE — TELEPHONE ENCOUNTER
Sean Pt  Brito Tam is calling, to see if Dr Rodney Silva can give another alterative for Ventolin inhaler his insurance will not pay for it thank you.

## 2020-07-24 RX ORDER — ALBUTEROL SULFATE 90 UG/1
AEROSOL, METERED RESPIRATORY (INHALATION)
Qty: 18 G | Refills: 4 | Status: SHIPPED | OUTPATIENT
Start: 2020-07-24 | End: 2020-07-27

## 2020-07-24 NOTE — TELEPHONE ENCOUNTER
Next Visit Date:  No future appointments.     Health Maintenance   Topic Date Due    AAA screen  1955    HIV screen  01/17/1970    Shingles Vaccine (1 of 2) 01/17/2005    Low dose CT lung screening  01/17/2010    Flu vaccine (1) 09/01/2020    Lipid screen  11/19/2020    Potassium monitoring  11/19/2020    Creatinine monitoring  11/19/2020    PSA counseling  11/19/2020    Pneumococcal 65+ years Vaccine (1 of 1 - PPSV23) 08/02/2021    DTaP/Tdap/Td vaccine (2 - Td) 08/02/2026    Colon cancer screen colonoscopy  02/20/2029    Hepatitis A vaccine  Aged Out    Hepatitis B vaccine  Aged Out    Hib vaccine  Aged Out    Meningococcal (ACWY) vaccine  Aged Out       Hemoglobin A1C (%)   Date Value   11/19/2019 5.5             ( goal A1C is < 7)   No results found for: LABMICR  LDL Cholesterol (mg/dL)   Date Value   11/19/2019 99   02/14/2018 166 (H)       (goal LDL is <100)   AST (U/L)   Date Value   11/19/2019 22     ALT (U/L)   Date Value   11/19/2019 17     BUN (mg/dL)   Date Value   11/19/2019 13     BP Readings from Last 3 Encounters:   11/19/19 (!) 142/80   02/20/19 (!) 143/45   02/20/19 (!) 180/89          (goal 120/80)    All Future Testing planned in [facility]  Lab Frequency Next Occurrence               Patient Active Problem List:     Allergic rhinitis     Hepatitis C     Dyslipidemia     Hypertension     Erectile dysfunction     Asthma     Hx of rotator cuff surgery     Depression     1/26/17 RALP     Chronic obstructive pulmonary disease ([facility] [address]

## 2020-07-27 RX ORDER — ALBUTEROL SULFATE 90 UG/1
AEROSOL, METERED RESPIRATORY (INHALATION)
Qty: 18 G | Refills: 4 | Status: SHIPPED | OUTPATIENT
Start: 2020-07-27 | End: 2021-03-29

## 2020-07-27 NOTE — TELEPHONE ENCOUNTER
Next Visit Date:  No future appointments.     Health Maintenance   Topic Date Due    AAA screen  1955    HIV screen  01/17/1970    Shingles Vaccine (1 of 2) 01/17/2005    Low dose CT lung screening  01/17/2010    Flu vaccine (1) 09/01/2020    Lipid screen  11/19/2020    Potassium monitoring  11/19/2020    Creatinine monitoring  11/19/2020    PSA counseling  11/19/2020    Pneumococcal 65+ years Vaccine (1 of 1 - PPSV23) 08/02/2021    DTaP/Tdap/Td vaccine (2 - Td) 08/02/2026    Colon cancer screen colonoscopy  02/20/2029    Hepatitis A vaccine  Aged Out    Hepatitis B vaccine  Aged Out    Hib vaccine  Aged Out    Meningococcal (ACWY) vaccine  Aged Out       Hemoglobin A1C (%)   Date Value   11/19/2019 5.5             ( goal A1C is < 7)   No results found for: LABMICR  LDL Cholesterol (mg/dL)   Date Value   11/19/2019 99   02/14/2018 166 (H)       (goal LDL is <100)   AST (U/L)   Date Value   11/19/2019 22     ALT (U/L)   Date Value   11/19/2019 17     BUN (mg/dL)   Date Value   11/19/2019 13     BP Readings from Last 3 Encounters:   11/19/19 (!) 142/80   02/20/19 (!) 143/45   02/20/19 (!) 180/89          (goal 120/80)    All Future Testing planned in [facility]  Lab Frequency Next Occurrence               Patient Active Problem List:     Allergic rhinitis     Hepatitis C     Dyslipidemia     Hypertension     Erectile dysfunction     Asthma     Hx of rotator cuff surgery     Depression     1/26/17 RALP     Chronic obstructive pulmonary disease ([facility] [address]

## 2020-08-18 ENCOUNTER — TELEPHONE (OUTPATIENT)
Dept: FAMILY MEDICINE CLINIC | Age: 65
End: 2020-08-18

## 2020-08-18 NOTE — TELEPHONE ENCOUNTER
Pt wants to know if he can get the dosage upped on his lexarpro. Pt says the 10 mg is not helping anymore and he would like the 20 mg. Please advise. ramo on [facility].  Advised you are not in the office this week

## 2020-08-19 RX ORDER — ESCITALOPRAM OXALATE 20 MG/1
20 TABLET ORAL DAILY
Qty: 30 TABLET | Refills: 5 | Status: SHIPPED | OUTPATIENT
Start: 2020-08-19 | End: 2021-04-13 | Stop reason: SDUPTHER

## 2020-12-15 NOTE — TELEPHONE ENCOUNTER
Nehemias Mon is calling to request a refill on the following medication(s):    Medication Request:  Requested Prescriptions     Pending Prescriptions Disp Refills    albuterol sulfate HFA (VENTOLIN HFA) 108 (90 Base) MCG/ACT inhaler 18 g 5     Sig: inhale 2 puffs by mouth every 4 hours if needed for wheezing       Last Visit Date (If Applicable):  41/08/1675    Next Visit Date:    Visit date not found

## 2020-12-15 NOTE — LETTER
[facility]  [address] [place] [address]  Dept: [phone]    12/17/2020    [id number]      Dear Susan Dugan    In addition to helping you feel better when you are sick, we are interested in preventing illness and injury in the first place. In the spirit of maintaining your good health, your record indicates that you are due for an appointment.        Please call [phone] to schedule     I look forward to seeing you soon     Sincerely,       [facility]

## 2020-12-16 RX ORDER — ALBUTEROL SULFATE 90 UG/1
AEROSOL, METERED RESPIRATORY (INHALATION)
Qty: 18 G | Refills: 0 | Status: SHIPPED | OUTPATIENT
Start: 2020-12-16 | End: 2021-01-05 | Stop reason: SDUPTHER

## 2021-01-04 DIAGNOSIS — J44.9 CHRONIC OBSTRUCTIVE PULMONARY DISEASE, UNSPECIFIED COPD TYPE (HCC): ICD-10-CM

## 2021-01-04 RX ORDER — ALBUTEROL SULFATE 90 UG/1
AEROSOL, METERED RESPIRATORY (INHALATION)
Qty: 18 G | Refills: 0 | OUTPATIENT
Start: 2021-01-04

## 2021-01-04 RX ORDER — ALBUTEROL SULFATE 90 UG/1
AEROSOL, METERED RESPIRATORY (INHALATION)
Qty: 18 G | Refills: 4 | OUTPATIENT
Start: 2021-01-04

## 2021-01-04 NOTE — TELEPHONE ENCOUNTER
Karan Guajardo is calling to request a refill on the following medication(s):    Medication Request:  Requested Prescriptions     Pending Prescriptions Disp Refills    albuterol sulfate HFA (VENTOLIN HFA) 108 (90 Base) MCG/ACT inhaler 18 g 0     Sig: inhale 2 puffs by mouth every 4 hours if needed for wheezing    albuterol sulfate  (90 Base) MCG/ACT inhaler 18 g 4       Last Visit Date (If Applicable):  20/03/1781    Next Visit Date:    Visit date not found

## 2021-01-05 RX ORDER — ALBUTEROL SULFATE 90 UG/1
AEROSOL, METERED RESPIRATORY (INHALATION)
Qty: 18 G | Refills: 0 | Status: SHIPPED | OUTPATIENT
Start: 2021-01-05 | End: 2021-01-20 | Stop reason: SDUPTHER

## 2021-01-20 ENCOUNTER — TELEPHONE (OUTPATIENT)
Dept: FAMILY MEDICINE CLINIC | Age: 66
End: 2021-01-20

## 2021-01-20 DIAGNOSIS — J44.9 CHRONIC OBSTRUCTIVE PULMONARY DISEASE, UNSPECIFIED COPD TYPE (HCC): ICD-10-CM

## 2021-01-20 RX ORDER — ALBUTEROL SULFATE 90 UG/1
AEROSOL, METERED RESPIRATORY (INHALATION)
Qty: 18 G | Refills: 0 | Status: SHIPPED | OUTPATIENT
Start: 2021-01-20 | End: 2021-02-04

## 2021-02-04 DIAGNOSIS — J44.9 CHRONIC OBSTRUCTIVE PULMONARY DISEASE, UNSPECIFIED COPD TYPE (HCC): ICD-10-CM

## 2021-02-04 RX ORDER — ALBUTEROL SULFATE 90 UG/1
AEROSOL, METERED RESPIRATORY (INHALATION)
Qty: 18 G | Refills: 0 | Status: SHIPPED | OUTPATIENT
Start: 2021-02-04 | End: 2021-02-19

## 2021-02-04 NOTE — TELEPHONE ENCOUNTER
Linda Gabriel is calling to request a refill on the following medication(s):    Medication Request:  Requested Prescriptions     Pending Prescriptions Disp Refills    albuterol sulfate  (90 Base) MCG/ACT inhaler [Pharmacy Med Name: ALBUTEROL HFA 90 MCG INHALER] 18 g 0     Sig: INHALE TWO PUFFS BY MOUTH EVERY 4 HOURS IF NEEDED FOR WHEEZING       Last Visit Date (If Applicable):  Visit date not found    Next Visit Date:    3/12/2021

## 2021-02-19 DIAGNOSIS — J44.9 CHRONIC OBSTRUCTIVE PULMONARY DISEASE, UNSPECIFIED COPD TYPE (HCC): ICD-10-CM

## 2021-02-19 RX ORDER — ALBUTEROL SULFATE 90 UG/1
AEROSOL, METERED RESPIRATORY (INHALATION)
Qty: 18 G | Refills: 0 | Status: SHIPPED | OUTPATIENT
Start: 2021-02-19 | End: 2021-03-09

## 2021-02-19 NOTE — TELEPHONE ENCOUNTER
Vijay Rojo is calling to request a refill on the following medication(s):    Medication Request:  Requested Prescriptions     Pending Prescriptions Disp Refills    albuterol sulfate  (90 Base) MCG/ACT inhaler [Pharmacy Med Name: ALBUTEROL HFA 90 MCG INHALER] 18 g 0     Sig: INHALE TWO PUFFS BY MOUTH EVERY 4 HOURS IF NEEDED FOR WHEEZING       Last Visit Date (If Applicable):  71/72/7852  Next Visit Date:    3/12/2021

## 2021-03-06 DIAGNOSIS — J44.9 CHRONIC OBSTRUCTIVE PULMONARY DISEASE, UNSPECIFIED COPD TYPE (HCC): ICD-10-CM

## 2021-03-08 NOTE — TELEPHONE ENCOUNTER
Rosa Quispe is calling to request a refill on the following medication(s):    Medication Request:  Requested Prescriptions     Pending Prescriptions Disp Refills    albuterol sulfate  (90 Base) MCG/ACT inhaler [Pharmacy Med Name: ALBUTEROL HFA 90 MCG INHALER] 18 g 0     Sig: INHALE TWO PUFFS BY MOUTH EVERY 4 HOURS AS NEEDED FOR WHEEZING       Last Visit Date (If Applicable):  35/90/2769    Next Visit Date:    3/12/2021

## 2021-03-09 RX ORDER — ALBUTEROL SULFATE 90 UG/1
AEROSOL, METERED RESPIRATORY (INHALATION)
Qty: 18 G | Refills: 0 | Status: SHIPPED | OUTPATIENT
Start: 2021-03-09 | End: 2021-03-12

## 2021-03-12 ENCOUNTER — OFFICE VISIT (OUTPATIENT)
Dept: FAMILY MEDICINE CLINIC | Age: 66
End: 2021-03-12
Payer: COMMERCIAL

## 2021-03-12 VITALS
TEMPERATURE: 96.4 F | OXYGEN SATURATION: 97 % | SYSTOLIC BLOOD PRESSURE: 140 MMHG | HEART RATE: 69 BPM | WEIGHT: 187.6 LBS | BODY MASS INDEX: 29.44 KG/M2 | HEIGHT: 67 IN | DIASTOLIC BLOOD PRESSURE: 90 MMHG

## 2021-03-12 DIAGNOSIS — F32.A DEPRESSION, UNSPECIFIED DEPRESSION TYPE: ICD-10-CM

## 2021-03-12 DIAGNOSIS — Z12.5 PROSTATE CANCER SCREENING: ICD-10-CM

## 2021-03-12 DIAGNOSIS — I10 ESSENTIAL HYPERTENSION: ICD-10-CM

## 2021-03-12 DIAGNOSIS — E78.5 DYSLIPIDEMIA: ICD-10-CM

## 2021-03-12 DIAGNOSIS — R73.01 IFG (IMPAIRED FASTING GLUCOSE): ICD-10-CM

## 2021-03-12 DIAGNOSIS — J30.9 ALLERGIC RHINITIS, UNSPECIFIED SEASONALITY, UNSPECIFIED TRIGGER: Primary | ICD-10-CM

## 2021-03-12 DIAGNOSIS — J44.9 CHRONIC OBSTRUCTIVE PULMONARY DISEASE, UNSPECIFIED COPD TYPE (HCC): ICD-10-CM

## 2021-03-12 DIAGNOSIS — J45.20 MILD INTERMITTENT ASTHMA WITHOUT COMPLICATION: ICD-10-CM

## 2021-03-12 PROCEDURE — 99214 OFFICE O/P EST MOD 30 MIN: CPT | Performed by: FAMILY MEDICINE

## 2021-03-12 RX ORDER — MOMETASONE FUROATE 50 UG/1
2 SPRAY, METERED NASAL DAILY
Qty: 1 INHALER | Refills: 3 | Status: SHIPPED | OUTPATIENT
Start: 2021-03-12

## 2021-03-12 RX ORDER — MONTELUKAST SODIUM 10 MG/1
10 TABLET ORAL DAILY
Qty: 30 TABLET | Refills: 3 | Status: SHIPPED | OUTPATIENT
Start: 2021-03-12 | End: 2021-04-13 | Stop reason: SDUPTHER

## 2021-03-12 SDOH — ECONOMIC STABILITY: FOOD INSECURITY: WITHIN THE PAST 12 MONTHS, YOU WORRIED THAT YOUR FOOD WOULD RUN OUT BEFORE YOU GOT MONEY TO BUY MORE.: NEVER TRUE

## 2021-03-12 SDOH — ECONOMIC STABILITY: TRANSPORTATION INSECURITY
IN THE PAST 12 MONTHS, HAS LACK OF TRANSPORTATION KEPT YOU FROM MEETINGS, WORK, OR FROM GETTING THINGS NEEDED FOR DAILY LIVING?: NO

## 2021-03-12 SDOH — ECONOMIC STABILITY: INCOME INSECURITY: HOW HARD IS IT FOR YOU TO PAY FOR THE VERY BASICS LIKE FOOD, HOUSING, MEDICAL CARE, AND HEATING?: NOT HARD AT ALL

## 2021-03-12 ASSESSMENT — PATIENT HEALTH QUESTIONNAIRE - PHQ9
1. LITTLE INTEREST OR PLEASURE IN DOING THINGS: 0
SUM OF ALL RESPONSES TO PHQ9 QUESTIONS 1 & 2: 0
SUM OF ALL RESPONSES TO PHQ QUESTIONS 1-9: 0
SUM OF ALL RESPONSES TO PHQ QUESTIONS 1-9: 0

## 2021-03-15 ASSESSMENT — ENCOUNTER SYMPTOMS
COUGH: 0
HOARSE VOICE: 0
BLURRED VISION: 0
FREQUENT THROAT CLEARING: 0
DIFFICULTY BREATHING: 0
HEMOPTYSIS: 0
HEARTBURN: 0
ALLERGIC/IMMUNOLOGIC NEGATIVE: 1
ORTHOPNEA: 0
CHEST TIGHTNESS: 0
GASTROINTESTINAL NEGATIVE: 1
SHORTNESS OF BREATH: 1
TROUBLE SWALLOWING: 0
SORE THROAT: 0
WHEEZING: 1
RHINORRHEA: 0
SPUTUM PRODUCTION: 0
EYES NEGATIVE: 1

## 2021-03-15 ASSESSMENT — COPD QUESTIONNAIRES: COPD: 1

## 2021-03-16 NOTE — PROGRESS NOTES
AP[facility] Progress Note    Date:3/15/2021         Patient Name:Sukh Latif     YOB: 1955     Age:66 y.o. Assessment/Plan        Problem List Items Addressed This Visit        Circulatory    Hypertension     Mildly uncontrolled  On Cozaar 50mg  Lifestyle modifications         Relevant Orders    CBC Auto Differential    Comprehensive Metabolic Panel       Respiratory    Allergic rhinitis - Primary     Uncontrolled  Trial Singulair and Nasonex         Relevant Medications    montelukast (SINGULAIR) 10 MG tablet    mometasone (NASONEX) 50 MCG/ACT nasal spray    Other Relevant Orders    CBC Auto Differential    Comprehensive Metabolic Panel    Asthma     Uncontrolled on albuterol prn  Spirometry         Relevant Medications    montelukast (SINGULAIR) 10 MG tablet    Other Relevant Orders    Spirometry With Bronchodilator    CBC Auto Differential    Comprehensive Metabolic Panel    Chronic obstructive pulmonary disease (HCC)     Uncontrolled on albuterol prn  Spirometry         Relevant Medications    montelukast (SINGULAIR) 10 MG tablet    mometasone (NASONEX) 50 MCG/ACT nasal spray    Other Relevant Orders    Spirometry With Bronchodilator    CBC Auto Differential    Comprehensive Metabolic Panel       Other    Dyslipidemia     Compliant with Lipitor         Relevant Orders    CBC Auto Differential    Comprehensive Metabolic Panel    Lipid Panel    Depression     Controlled on Lexapro         Relevant Orders    CBC Auto Differential    Comprehensive Metabolic Panel      Other Visit Diagnoses     Prostate cancer screening        Relevant Orders    CBC Auto Differential    Comprehensive Metabolic Panel    PSA screening    IFG (impaired fasting glucose)        Relevant Orders    CBC Auto Differential    Comprehensive Metabolic Panel    Hemoglobin A1C           No follow-ups on file.     Electronically signed by Viky Andre DO on 3/15/21         Sara     Michelle Coleman is a 77 y.o. male presenting today for   Chief Complaint   Patient presents with    Establish Care    Wheezing   . Allergic Rhinitis  Patient presents for evaluation of allergic symptoms. Symptoms include nasal congestion, postnasal drip and sinus pressure and are not present in a seasonal pattern. Treatment currently includes intranasal decongestants: phenylephrine daily and is effective in the patient's opinion until he stops taking it then it gets much worse. Fredi Phillips is a 77 y.o. male who presents for follow up of depression. Current symptoms include depressed mood. Symptoms have been gradually improving since that time. Patient denies SI/HI. Previous treatment includes: medication. He complains of the following side effects from the treatment: none. Asthma  He complains of shortness of breath and wheezing. There is no chest tightness, cough, difficulty breathing, frequent throat clearing, hemoptysis, hoarse voice or sputum production. This is a chronic problem. The current episode started more than 1 year ago. The problem occurs daily. The problem has been gradually worsening. Pertinent negatives include no appetite change, chest pain, dyspnea on exertion, ear congestion, ear pain, fever, headaches, heartburn, malaise/fatigue, myalgias, nasal congestion, orthopnea, PND, postnasal drip, rhinorrhea, sneezing, sore throat, sweats, trouble swallowing or weight loss. His symptoms are alleviated by beta-agonist. He reports minimal improvement on treatment. His past medical history is significant for asthma and COPD. COPD  He complains of shortness of breath and wheezing. There is no chest tightness, cough, difficulty breathing, frequent throat clearing, hemoptysis, hoarse voice or sputum production. This is a chronic problem. The current episode started more than 1 year ago. The problem occurs intermittently. The problem has been waxing and waning.  Pertinent negatives include no appetite change, chest pain, dyspnea on exertion, ear congestion, ear pain, fever, headaches, heartburn, malaise/fatigue, myalgias, nasal congestion, orthopnea, PND, postnasal drip, rhinorrhea, sneezing, sore throat, sweats, trouble swallowing or weight loss. His symptoms are alleviated by beta-agonist. He reports minimal improvement on treatment. His past medical history is significant for asthma and COPD. Hypertension  This is a chronic problem. The current episode started more than 1 year ago. The problem has been gradually improving since onset. The problem is uncontrolled. Associated symptoms include shortness of breath. Pertinent negatives include no anxiety, blurred vision, chest pain, headaches, malaise/fatigue, neck pain, orthopnea, palpitations, peripheral edema, PND or sweats. Past treatments include angiotensin blockers. The current treatment provides moderate improvement. There is no history of chronic renal disease. Hyperlipidemia  This is a chronic problem. The current episode started more than 1 year ago. He has no history of chronic renal disease, diabetes, hypothyroidism, liver disease, obesity or nephrotic syndrome. Associated symptoms include shortness of breath. Pertinent negatives include no chest pain, focal sensory loss, focal weakness, leg pain or myalgias. Current antihyperlipidemic treatment includes statins. Review of Systems   Review of Systems   Constitutional: Negative. Negative for appetite change, fever, malaise/fatigue and weight loss. HENT: Negative. Negative for ear pain, hoarse voice, postnasal drip, rhinorrhea, sneezing, sore throat and trouble swallowing. Eyes: Negative. Negative for blurred vision. Respiratory: Positive for shortness of breath and wheezing. Negative for cough, hemoptysis and sputum production. Cardiovascular: Negative. Negative for chest pain, dyspnea on exertion, palpitations, orthopnea and PND. Gastrointestinal: Negative. Negative for heartburn.    Endocrine: Negative. Genitourinary: Negative. Musculoskeletal: Negative. Negative for myalgias and neck pain. Skin: Negative. Allergic/Immunologic: Negative. Neurological: Negative. Negative for focal weakness and headaches. Hematological: Negative. Psychiatric/Behavioral: Negative. All other systems reviewed and are negative. Medications     Current Outpatient Medications   Medication Sig Dispense Refill    montelukast (SINGULAIR) 10 MG tablet Take 1 tablet by mouth daily 30 tablet 3    mometasone (NASONEX) 50 MCG/ACT nasal spray 2 sprays by Nasal route daily 1 Inhaler 3    escitalopram (LEXAPRO) 20 MG tablet Take 1 tablet by mouth daily 30 tablet 5    albuterol sulfate  (90 Base) MCG/ACT inhaler INHALE TWO PUFFS BY MOUTH EVERY 4 HOURS FOR WHEEZING 18 g 4    losartan (COZAAR) 50 MG tablet take 1 tablet by mouth once daily 30 tablet 11    atorvastatin (LIPITOR) 10 MG tablet take 1 tablet by mouth once daily 30 tablet 11     No current facility-administered medications for this visit. Past History    Past Medical History:   has a past medical history of Allergic rhinitis, Asthma, Depression, Dyslipidemia, Erectile dysfunction, Hepatitis C, Hx of rotator cuff surgery, and Hypertension. Social History:   reports that he quit smoking about 2 years ago. His smoking use included cigarettes. He started smoking about 50 years ago. He has a 40.00 pack-year smoking history. He has quit using smokeless tobacco. He reports current alcohol use of about 7.0 standard drinks of alcohol per week. He reports that he does not use drugs.      Family History:   Family History   Problem Relation Age of Onset    Lung Cancer Mother     Heart Attack Father     Cancer Father         Throat       Surgical History:   Past Surgical History:   Procedure Laterality Date    COLONOSCOPY      COLONOSCOPY  02/20/2019    polypectomy    COLONOSCOPY N/A 2/20/2019    COLONOSCOPY POLYPECTOMY SNARE/COLD BIOPSY performed by Crissy Quinonez MD at [address]  01/26/2017    robotic radical prostatectomy with pelvic node dissection    ROTATOR CUFF REPAIR Right 2001 ? Physical Examination      Vitals:  BP (!) 140/90   Pulse 69   Temp 96.4 °F (35.8 °C)   Ht 5' 7\" (1.702 m)   Wt 187 lb 9.6 oz (85.1 kg)   SpO2 97%   BMI 29.38 kg/m²     Physical Exam  Vitals signs and nursing note reviewed. Constitutional:       General: He is not in acute distress. Appearance: Normal appearance. He is normal weight. He is not ill-appearing, toxic-appearing or diaphoretic. HENT:      Head: Normocephalic and atraumatic. Right Ear: External ear normal.      Left Ear: External ear normal.   Eyes:      General: No scleral icterus. Right eye: No discharge. Left eye: No discharge. Conjunctiva/sclera: Conjunctivae normal.   Cardiovascular:      Rate and Rhythm: Normal rate and regular rhythm. Pulses: Normal pulses. Heart sounds: Normal heart sounds. No murmur. No friction rub. No gallop. Pulmonary:      Effort: Pulmonary effort is normal. No respiratory distress. Breath sounds: Normal breath sounds. No stridor. No wheezing, rhonchi or rales. Chest:      Chest wall: No tenderness. Skin:     General: Skin is warm. Coloration: Skin is not jaundiced or pale. Neurological:      Mental Status: He is alert and oriented to person, place, and time. Mental status is at baseline. Psychiatric:         Mood and Affect: Mood normal.         Behavior: Behavior normal.         Thought Content:  Thought content normal.         Judgment: Judgment normal.         Labs/Imaging/Diagnostics   Labs:  Hemoglobin A1C   Date Value Ref Range Status   11/19/2019 5.5 4.0 - 6.0 % Final       Imaging Last 24 Hours:  Echocardiogram 2D W M-Mode  [facility]    Transthoracic Echocardiography Report (TTE)     Patient Name Stephania Rainey Date of Study               02/14/2018 J      Date of      1955  Gender                      Male   Birth      Age          61 year(s)  Race                              Room Number  OP          Height:                     67 inch, 170.18 cm      Corporate ID [id number]  Weight:                     185 pounds, 83.9 kg   #      Patient Acct [de-identified]   BSA:          1.96 m^2      BMI:      28.98   #                                                              kg/m^2      MR #         [id number]     Sonographer                 [id number]      Accession #  [id number]   Interpreting Physician      [address]      Fellow                   Referring Nurse                            Practitioner      Interpreting             Referring Physician         Shanna Francisco,   Felipe     Type of Study      TTE procedure:2D Echocardiogram, M-Mode, Doppler, Color Doppler. Procedure Date  Date: 02/14/2018 Start: 08:20 AM    Study Location: [address] [place] [address]  Technical Quality: Adequate visualization    Indications:Dyspnea/SOB. Patient Status: Inpatient    Height: 67 inches Weight: 185 pounds BSA: 1.96 m^2 BMI: 28.98 kg/m^2    CONCLUSIONS    Summary  Left ventricle is normal in size  Global left ventricular systolic function is normal  Estimated ejection fraction is 60 % . No significant valvular regurgitation or stenosis seen. No pericardial effusion seen. Normal aortic root dimension.     Signature  ----------------------------------------------------------------------------   Electronically signed by Janak Weiner on 02/14/2018   08:44 AM  ----------------------------------------------------------------------------    ----------------------------------------------------------------------------   Electronically signed by Candelario,Luis Alberto(Interpreting physician) on 02/14/2018   09:43 AM  ----------------------------------------------------------------------------  FINDINGS  Left Atrium  Left atrium is at upper limits of normal.  Left Ventricle  Left ventricle is normal in size  Global left ventricular systolic function is normal  Estimated ejection fraction is 60 % . Right Atrium  Right atrium is normal in size. Right Ventricle  Normal right ventricular size and function. Mitral Valve  Normal mitral valve structure and function. Trivial mitral regurgitation. Aortic Valve  Normal aortic valve structure and function without stenosis or  regurgitation. Tricuspid Valve  Normal tricuspid valve structure and function. Pulmonic Valve  The pulmonic valve is normal in structure. No pulmonic insufficiency. Pericardial Effusion  No pericardial effusion seen. Miscellaneous  Normal aortic root dimension.     M-mode / 2D Measurements & Calculations:      LVIDd:5.33 cm(3.7 - 5.6 cm)      Diastolic MWUPSW:485.6 ml   LVIDs:3.76 cm(2.2 - 4.0 cm)      Systolic ZNKCVV:82.1 ml   IVSd:1.17 cm(0.6 - 1.1 cm)       Aortic Root:3 cm(2.0 - 3.7 cm)   LVPWd:0.73 cm(0.6 - 1.1 cm)      LA Dimension: 4.2 cm(1.9 - 4.0 cm)   Fractional Shortenin.46 %   Calculated LVEF (%): 63.19 %      Mitral:                                    Aortic      Valve Area (P1/2-Time): 1.67 cm^2          Peak Velocity: 1.26 m/s   Peak E-Wave: 0.79 m/s                      Peak Gradient: 6.35 mmHg   Peak A-Wave: 0.94 m/s   E/A Ratio: 0.85   Peak Gradient: 2.5 mmHg   Deceleration Time: 430 msec   P1/2t: 132 msec     Septal Wall E' velocity:0.06 m/s  Lateral Wall E' velocity:0.04 m/s

## 2021-03-16 NOTE — PATIENT INSTRUCTIONS
Patient Education        Chronic Obstructive Pulmonary Disease (COPD): Care Instructions  Your Care Instructions     Chronic obstructive pulmonary disease (COPD) is a general term for a group of lung diseases, including emphysema and chronic bronchitis. People with COPD have decreased airflow in and out of the lungs, which makes it hard to breathe. The airways also can get clogged with thick mucus. Cigarette smoking is a major cause of COPD. Although there is no cure for COPD, you can slow its progress. Following your treatment plan and taking care of yourself can help you feel better and live longer. Follow-up care is a key part of your treatment and safety. Be sure to make and go to all appointments, and call your doctor if you are having problems. It's also a good idea to know your test results and keep a list of the medicines you take. How can you care for yourself at home? Staying healthy    · Do not smoke. This is the most important step you can take to prevent more damage to your lungs. If you need help quitting, talk to your doctor about stop-smoking programs and medicines. These can increase your chances of quitting for good.     · Avoid colds and flu. Get a pneumococcal vaccine shot. If you have had one before, ask your doctor whether you need a second dose. Get the flu vaccine every fall. If you must be around people with colds or the flu, wash your hands often.     · Avoid secondhand smoke, air pollution, and high altitudes. Also avoid cold, dry air and hot, humid air. Stay at home with your windows closed when air pollution is bad. Medicines and oxygen therapy    · Take your medicines exactly as prescribed. Call your doctor if you think you are having a problem with your medicine. You may be taking medicines such as:  ? Bronchodilators. These help open your airways and make breathing easier. They are either short-acting (work for 6 to 9 hours) or long-acting (work for 24 hours).  You inhale most bronchodilators, so they start to act quickly. Always carry your quick-relief inhaler with you in case you need it while you are away from home. ? Corticosteroids (prednisone, budesonide). These reduce airway inflammation. They come in pill or inhaled form. You must take these medicines every day for them to work well.     · Ask your doctor or pharmacist if a spacer is right for you. A spacer may help you get more inhaled medicine to your lungs. If you use one, ask how to use it properly.     · Do not take any vitamins, over-the-counter medicine, or herbal products without talking to your doctor first.     · If your doctor prescribed antibiotics, take them as directed. Do not stop taking them just because you feel better. You need to take the full course of antibiotics.     · If you use oxygen therapy, use the flow rate your doctor has recommended. Don't change it without talking to your doctor first. Oxygen therapy boosts the amount of oxygen in your blood and helps you breathe easier. Activity    · Get regular exercise. Walking is an easy way to get exercise. Start out slowly, and walk a little more each day.     · Pay attention to your breathing. You are exercising too hard if you can't talk while you exercise.     · Take short rest breaks when doing household chores and other activities.     · Learn breathing methods--such as breathing through pursed lips--to help you become less short of breath.     · If your doctor has not set you up with a pulmonary rehabilitation program, ask if rehab is right for you. Rehab includes exercise programs, education about your disease and how to manage it, help with diet and other changes, and emotional support. Diet    · Eat regular, healthy meals. Use bronchodilators about 1 hour before you eat to make it easier to eat. Eat several small meals instead of three large ones.  Drink beverages at the end of the meal. Avoid foods that are hard to chew.     · Eat foods that contain protein so you don't lose muscle mass.     · Talk with your doctor if you gain too much weight or if you lose weight without trying. Mental health    · Talk to your family, friends, or a therapist about your feelings. Some people feel frightened, angry, hopeless, helpless, and even guilty. Talking openly about bad feelings can help you cope. If these feelings last, talk to your doctor. When should you call for help? Call 911 anytime you think you may need emergency care. For example, call if:    · You have severe trouble breathing. Call your doctor now or seek immediate medical care if:    · You have new or worse trouble breathing.     · You cough up blood.     · You have a fever. Watch closely for changes in your health, and be sure to contact your doctor if:    · You cough more deeply or more often, especially if you notice more mucus or a change in the color of your mucus.     · You have new or worse swelling in your legs or belly.     · You are not getting better as expected. Where can you learn more? Go to [email or web address]. org and sign in to your [employer] account. Enter S352 in the [employer] box to learn more about \"Chronic Obstructive Pulmonary Disease (COPD): Care Instructions. \"     If you do not have an account, please click on the \"Sign Up Now\" link. Current as of: October 26, 2020               Content Version: 12.8  © 5862-8138 Healthwise, Incorporated. Care instructions adapted under license by [facility] ([facility]). If you have questions about a medical condition or this instruction, always ask your healthcare professional. [address] any warranty or liability for your use of this information.

## 2021-03-29 RX ORDER — ALBUTEROL SULFATE 90 UG/1
AEROSOL, METERED RESPIRATORY (INHALATION)
Qty: 18 G | Refills: 0 | Status: SHIPPED | OUTPATIENT
Start: 2021-03-29 | End: 2021-03-30 | Stop reason: SDUPTHER

## 2021-03-29 NOTE — TELEPHONE ENCOUNTER
Teo Rendon is calling to request a refill on the following medication(s):    Medication Request:  Requested Prescriptions     Pending Prescriptions Disp Refills    albuterol sulfate  (90 Base) MCG/ACT inhaler [Pharmacy Med Name: ALBUTEROL HFA 90 MCG INHALER] 18 g 0     Sig: INHALE TWO PUFFS BY MOUTH EVERY 4 HOURS AS NEEDED FOR WHEEZING       Last Visit Date (If Applicable):  9/21/5460    Next Visit Date:    Visit date not found

## 2021-03-30 RX ORDER — ALBUTEROL SULFATE 90 UG/1
AEROSOL, METERED RESPIRATORY (INHALATION)
Qty: 18 G | Refills: 0 | Status: SHIPPED | OUTPATIENT
Start: 2021-03-30 | End: 2021-04-13 | Stop reason: SDUPTHER

## 2021-03-30 NOTE — TELEPHONE ENCOUNTER
Brielle Vee is calling to request a refill on the following medication(s):    Medication Request:  Requested Prescriptions     Pending Prescriptions Disp Refills    albuterol sulfate  (90 Base) MCG/ACT inhaler 18 g 0     Sig: INHALE TWO PUFFS BY MOUTH EVERY 4 HOURS AS NEEDED FOR WHEEZING       Last Visit Date (If Applicable):  1/83/7812    Next Visit Date:    Visit date not found

## 2021-04-13 DIAGNOSIS — J30.9 ALLERGIC RHINITIS, UNSPECIFIED SEASONALITY, UNSPECIFIED TRIGGER: ICD-10-CM

## 2021-04-13 RX ORDER — ALBUTEROL SULFATE 90 UG/1
AEROSOL, METERED RESPIRATORY (INHALATION)
Qty: 18 G | Refills: 0 | Status: SHIPPED | OUTPATIENT
Start: 2021-04-13 | End: 2021-04-30 | Stop reason: SDUPTHER

## 2021-04-13 RX ORDER — LOSARTAN POTASSIUM 50 MG/1
50 TABLET ORAL DAILY
Qty: 30 TABLET | Refills: 2 | Status: SHIPPED | OUTPATIENT
Start: 2021-04-13

## 2021-04-13 RX ORDER — ESCITALOPRAM OXALATE 20 MG/1
20 TABLET ORAL DAILY
Qty: 30 TABLET | Refills: 2 | Status: SHIPPED | OUTPATIENT
Start: 2021-04-13 | End: 2021-09-13

## 2021-04-13 RX ORDER — MONTELUKAST SODIUM 10 MG/1
10 TABLET ORAL DAILY
Qty: 30 TABLET | Refills: 2 | Status: SHIPPED | OUTPATIENT
Start: 2021-04-13

## 2021-04-13 RX ORDER — ATORVASTATIN CALCIUM 10 MG/1
10 TABLET, FILM COATED ORAL DAILY
Qty: 30 TABLET | Refills: 2 | Status: SHIPPED | OUTPATIENT
Start: 2021-04-13

## 2021-04-13 NOTE — TELEPHONE ENCOUNTER
Francisco Javier Cassidy is calling to request a refill on the following medication(s):    Medication Request:  Requested Prescriptions     Pending Prescriptions Disp Refills    albuterol sulfate  (90 Base) MCG/ACT inhaler 18 g 0     Sig: INHALE TWO PUFFS BY MOUTH EVERY 4 HOURS AS NEEDED FOR WHEEZING    losartan (COZAAR) 50 MG tablet 30 tablet 2     Sig: Take 1 tablet by mouth daily    escitalopram (LEXAPRO) 20 MG tablet 30 tablet 2     Sig: Take 1 tablet by mouth daily    montelukast (SINGULAIR) 10 MG tablet 30 tablet 2     Sig: Take 1 tablet by mouth daily    atorvastatin (LIPITOR) 10 MG tablet 30 tablet 2     Sig: Take 1 tablet by mouth daily       Last Visit Date (If Applicable):  3/17/2936    Next Visit Date:    Visit date not found

## 2021-04-23 ENCOUNTER — HOSPITAL ENCOUNTER (OUTPATIENT)
Dept: LAB | Age: 66
Setting detail: SPECIMEN
Discharge: HOME | End: 2021-04-23
Payer: MEDICARE

## 2021-04-23 DIAGNOSIS — Z01.818 PREOP TESTING: Primary | ICD-10-CM

## 2021-04-23 PROCEDURE — U0005 INFEC AGEN DETEC AMPLI PROBE: HCPCS

## 2021-04-23 PROCEDURE — U0003 INFECTIOUS AGENT DETECTION BY NUCLEIC ACID (DNA OR RNA); SEVERE ACUTE RESPIRATORY SYNDROME CORONAVIRUS 2 (SARS-COV-2) (CORONAVIRUS DISEASE [COVID-19]), AMPLIFIED PROBE TECHNIQUE, MAKING USE OF HIGH THROUGHPUT TECHNOLOGIES AS DESCRIBED BY CMS-2020-01-R: HCPCS

## 2021-04-24 LAB
SARS-COV-2: NORMAL
SARS-COV-2: NOT DETECTED
SOURCE: NORMAL

## 2021-04-25 ENCOUNTER — TELEPHONE (OUTPATIENT)
Dept: PRIMARY CARE CLINIC | Age: 66
End: 2021-04-25

## 2021-04-27 ENCOUNTER — HOSPITAL ENCOUNTER (OUTPATIENT)
Dept: NEUROLOGY | Age: 66
Discharge: HOME | End: 2021-04-27
Payer: MEDICARE

## 2021-04-27 DIAGNOSIS — J44.9 CHRONIC OBSTRUCTIVE PULMONARY DISEASE, UNSPECIFIED COPD TYPE (HCC): ICD-10-CM

## 2021-04-27 DIAGNOSIS — J45.20 MILD INTERMITTENT ASTHMA WITHOUT COMPLICATION: ICD-10-CM

## 2021-04-27 LAB
EXPIRATORY TIME-POST: NORMAL
EXPIRATORY TIME: NORMAL
FEF 25-75% %CHNG: NORMAL
FEF 25-75% %PRED-POST: NORMAL
FEF 25-75% %PRED-PRE: NORMAL
FEF 25-75% PRED: NORMAL
FEF 25-75%-POST: NORMAL
FEF 25-75%-PRE: NORMAL
FEV1 %PRED-POST: 52 %
FEV1 %PRED-PRE: 40 %
FEV1 PRED: NORMAL
FEV1-POST: NORMAL
FEV1-PRE: NORMAL
FEV1/FVC %PRED-POST: NORMAL
FEV1/FVC %PRED-PRE: NORMAL
FEV1/FVC PRED: NORMAL
FEV1/FVC-POST: 56 %
FEV1/FVC-PRE: 53 %
FVC %PRED-POST: NORMAL
FVC %PRED-PRE: NORMAL
FVC PRED: NORMAL
FVC-POST: NORMAL
FVC-PRE: NORMAL
PEF %PRED-POST: NORMAL
PEF %PRED-PRE: NORMAL
PEF PRED: NORMAL
PEF%CHNG: NORMAL
PEF-POST: NORMAL
PEF-PRE: NORMAL

## 2021-04-27 PROCEDURE — 6370000000 HC RX 637 (ALT 250 FOR IP): Performed by: FAMILY MEDICINE

## 2021-04-27 PROCEDURE — 94060 EVALUATION OF WHEEZING: CPT

## 2021-04-27 RX ORDER — ALBUTEROL SULFATE 90 UG/1
2 AEROSOL, METERED RESPIRATORY (INHALATION) ONCE
Status: COMPLETED | OUTPATIENT
Start: 2021-04-27 | End: 2021-04-27

## 2021-04-27 RX ADMIN — ALBUTEROL SULFATE 2 PUFF: 90 AEROSOL, METERED RESPIRATORY (INHALATION) at 11:37

## 2021-04-27 ASSESSMENT — PULMONARY FUNCTION TESTS
FEV1/FVC_PRE: 53
FEV1/FVC_POST: 56
FEV1_PERCENT_PREDICTED_POST: 52
FEV1_PERCENT_PREDICTED_PRE: 40

## 2021-04-28 NOTE — PROCEDURES
[address] [facility] [address]                [address] [place] [address]                               PULMONARY FUNCTION    PATIENT NAME: Pietro Ramirez                      :        1955  MED REC NO:   [id number]                             ROOM:  ACCOUNT NO:   [de-identified]                           ADMIT DATE: 2021  PROVIDER:     Robby Ward    DATE OF PROCEDURE:  2021    TYPE OF STUDY:  Spirometry with bronchodilator testing. RESULTS:  The patient had FEV1 of 1.22 which is severely reduced at 40%  predicted. FVC was 2.33 which is moderately reduced at 58% predicted. FEV1/FVC was reduced at 53. The mid-flows were severely reduced at 20%  predicted. Following bronchodilators, there was significant improvement  in the flows. IMPRESSION:  Severe obstructive pulmonary disease with significant  improvement in the flows following bronchodilators. Clinical  correlation is recommended.         Nicole Leblanc    D: 2021 22:10:04       T: 2021 1:37:24     [id number]  Job#: [id number]     Doc#: [id number]    CC:

## 2021-04-29 DIAGNOSIS — J44.9 CHRONIC OBSTRUCTIVE PULMONARY DISEASE, UNSPECIFIED COPD TYPE (HCC): Primary | ICD-10-CM

## 2021-04-30 DIAGNOSIS — J44.9 CHRONIC OBSTRUCTIVE PULMONARY DISEASE, UNSPECIFIED COPD TYPE (HCC): Primary | ICD-10-CM

## 2021-04-30 RX ORDER — ALBUTEROL SULFATE 90 UG/1
AEROSOL, METERED RESPIRATORY (INHALATION)
Qty: 18 G | Refills: 5 | Status: SHIPPED | OUTPATIENT
Start: 2021-04-30 | End: 2021-10-15

## 2021-04-30 RX ORDER — BUDESONIDE, GLYCOPYRROLATE, AND FORMOTEROL FUMARATE 160; 9; 4.8 UG/1; UG/1; UG/1
2 AEROSOL, METERED RESPIRATORY (INHALATION) 2 TIMES DAILY
Qty: 2 INHALER | Refills: 0 | COMMUNITY
Start: 2021-04-30

## 2021-06-25 ENCOUNTER — OFFICE VISIT (OUTPATIENT)
Dept: FAMILY MEDICINE CLINIC | Age: 66
End: 2021-06-25
Payer: MEDICARE

## 2021-06-25 VITALS
HEIGHT: 67 IN | HEART RATE: 64 BPM | DIASTOLIC BLOOD PRESSURE: 80 MMHG | OXYGEN SATURATION: 98 % | BODY MASS INDEX: 28.88 KG/M2 | WEIGHT: 184 LBS | SYSTOLIC BLOOD PRESSURE: 120 MMHG

## 2021-06-25 DIAGNOSIS — Z00.00 ROUTINE GENERAL MEDICAL EXAMINATION AT A HEALTH CARE FACILITY: Primary | ICD-10-CM

## 2021-06-25 DIAGNOSIS — Z85.46 HISTORY OF PROSTATE CANCER: ICD-10-CM

## 2021-06-25 DIAGNOSIS — J44.9 CHRONIC OBSTRUCTIVE PULMONARY DISEASE, UNSPECIFIED COPD TYPE (HCC): ICD-10-CM

## 2021-06-25 DIAGNOSIS — F33.0 MAJOR DEPRESSIVE DISORDER, RECURRENT, MILD (HCC): ICD-10-CM

## 2021-06-25 PROBLEM — F33.9 MAJOR DEPRESSIVE DISORDER, RECURRENT, UNSPECIFIED (HCC): Status: ACTIVE | Noted: 2021-06-25

## 2021-06-25 PROBLEM — F33.9 MAJOR DEPRESSIVE DISORDER, RECURRENT, UNSPECIFIED (HCC): Status: RESOLVED | Noted: 2021-06-25 | Resolved: 2021-06-25

## 2021-06-25 PROBLEM — F33.1 MAJOR DEPRESSIVE DISORDER, RECURRENT, MODERATE (HCC): Status: ACTIVE | Noted: 2021-06-25

## 2021-06-25 PROBLEM — F33.1 MAJOR DEPRESSIVE DISORDER, RECURRENT, MODERATE (HCC): Status: RESOLVED | Noted: 2021-06-25 | Resolved: 2021-06-25

## 2021-06-25 PROCEDURE — 3017F COLORECTAL CA SCREEN DOC REV: CPT | Performed by: FAMILY MEDICINE

## 2021-06-25 PROCEDURE — 4040F PNEUMOC VAC/ADMIN/RCVD: CPT | Performed by: FAMILY MEDICINE

## 2021-06-25 PROCEDURE — 1123F ACP DISCUSS/DSCN MKR DOCD: CPT | Performed by: FAMILY MEDICINE

## 2021-06-25 PROCEDURE — G0438 PPPS, INITIAL VISIT: HCPCS | Performed by: FAMILY MEDICINE

## 2021-06-25 RX ORDER — BUPROPION HYDROCHLORIDE 150 MG/1
150 TABLET ORAL EVERY MORNING
Qty: 30 TABLET | Refills: 3 | Status: SHIPPED | OUTPATIENT
Start: 2021-06-25 | End: 2021-09-30

## 2021-06-25 ASSESSMENT — LIFESTYLE VARIABLES
HOW OFTEN DURING THE LAST YEAR HAVE YOU NEEDED AN ALCOHOLIC DRINK FIRST THING IN THE MORNING TO GET YOURSELF GOING AFTER A NIGHT OF HEAVY DRINKING: 0
HOW OFTEN DURING THE LAST YEAR HAVE YOU FOUND THAT YOU WERE NOT ABLE TO STOP DRINKING ONCE YOU HAD STARTED: 0
HOW OFTEN DO YOU HAVE SIX OR MORE DRINKS ON ONE OCCASION: 0
HOW MANY STANDARD DRINKS CONTAINING ALCOHOL DO YOU HAVE ON A TYPICAL DAY: 2
HAVE YOU OR SOMEONE ELSE BEEN INJURED AS A RESULT OF YOUR DRINKING: 0
HOW OFTEN DURING THE LAST YEAR HAVE YOU BEEN UNABLE TO REMEMBER WHAT HAPPENED THE NIGHT BEFORE BECAUSE YOU HAD BEEN DRINKING: 0
AUDIT TOTAL SCORE: 6
HAS A RELATIVE, FRIEND, DOCTOR, OR ANOTHER HEALTH PROFESSIONAL EXPRESSED CONCERN ABOUT YOUR DRINKING OR SUGGESTED YOU CUT DOWN: 0
HOW OFTEN DURING THE LAST YEAR HAVE YOU FAILED TO DO WHAT WAS NORMALLY EXPECTED FROM YOU BECAUSE OF DRINKING: 0
AUDIT-C TOTAL SCORE: 6
HOW OFTEN DURING THE LAST YEAR HAVE YOU HAD A FEELING OF GUILT OR REMORSE AFTER DRINKING: 0
HOW OFTEN DO YOU HAVE A DRINK CONTAINING ALCOHOL: 4

## 2021-06-25 ASSESSMENT — PATIENT HEALTH QUESTIONNAIRE - PHQ9
1. LITTLE INTEREST OR PLEASURE IN DOING THINGS: 1
SUM OF ALL RESPONSES TO PHQ QUESTIONS 1-9: 2
SUM OF ALL RESPONSES TO PHQ9 QUESTIONS 1 & 2: 2
SUM OF ALL RESPONSES TO PHQ QUESTIONS 1-9: 2
2. FEELING DOWN, DEPRESSED OR HOPELESS: 1
SUM OF ALL RESPONSES TO PHQ QUESTIONS 1-9: 2

## 2021-06-25 NOTE — PATIENT INSTRUCTIONS
Personalized Preventive Plan for Ramone Adhikari - 6/25/2021  Medicare offers a range of preventive health benefits. Some of the tests and screenings are paid in full while other may be subject to a deductible, co-insurance, and/or copay. Some of these benefits include a comprehensive review of your medical history including lifestyle, illnesses that may run in your family, and various assessments and screenings as appropriate. After reviewing your medical record and screening and assessments performed today your provider may have ordered immunizations, labs, imaging, and/or referrals for you. A list of these orders (if applicable) as well as your Preventive Care list are included within your After Visit Summary for your review. Other Preventive Recommendations:    · A preventive eye exam performed by an eye specialist is recommended every 1-2 years to screen for glaucoma; cataracts, macular degeneration, and other eye disorders. · A preventive dental visit is recommended every 6 months. · Try to get at least 150 minutes of exercise per week or 10,000 steps per day on a pedometer . · Order or download the FREE \"Exercise & Physical Activity: Your Everyday Guide\" from [employer]. Call 8-515.983.1441 or search [employer] online. · You need 0862-3481 mg of calcium and 9501-3050 IU of vitamin D per day. It is possible to meet your calcium requirement with diet alone, but a vitamin D supplement is usually necessary to meet this goal.  · When exposed to the sun, use a sunscreen that protects against both UVA and UVB radiation with an SPF of 30 or greater. Reapply every 2 to 3 hours or after sweating, drying off with a towel, or swimming. · Always wear a seat belt when traveling in a car. Always wear a helmet when riding a bicycle or motorcycle.

## 2021-06-25 NOTE — PROGRESS NOTES
radical prostatectomy with pelvic node dissection    ROTATOR CUFF REPAIR Right 2001 ? Family History   Problem Relation Age of Onset    Lung Cancer Mother     Heart Attack Father     Cancer Father         Throat       CareTeam (Including outside providers/suppliers regularly involved in providing care):   Patient Care Team:  Hair Montes DO as PCP - General (Family Medicine)  Hair Montes DO as PCP - [facility] Empaneled Provider  Santy Gonsalez MD as Consulting Physician  Erika Downs MD as Consulting Physician (Urology)    Wt Readings from Last 3 Encounters:   06/25/21 184 lb (83.5 kg)   03/12/21 187 lb 9.6 oz (85.1 kg)   11/19/19 186 lb 8 oz (84.6 kg)     Vitals:    06/25/21 0907   BP: 120/80   Site: Left Upper Arm   Position: Sitting   Cuff Size: Medium Adult   Pulse: 64   SpO2: 98%   Weight: 184 lb (83.5 kg)   Height: 5' 7.01\" (1.702 m)     Body mass index is 28.81 kg/m². Based upon direct observation of the patient, evaluation of cognition reveals recent and remote memory intact.     General Appearance: alert and oriented to person, place and time, well developed and well- nourished, in no acute distress  Skin: warm and dry, no rash or erythema  Head: normocephalic and atraumatic  Eyes: pupils equal, round, and reactive to light, extraocular eye movements intact, conjunctivae normal  ENT: tympanic membrane, external ear and ear canal normal bilaterally, nose without deformity, nasal mucosa and turbinates normal without polyps  Neck: supple and non-tender without mass, no thyromegaly or thyroid nodules, no cervical lymphadenopathy  Pulmonary/Chest: clear to auscultation bilaterally- no wheezes, rales or rhonchi, normal air movement, no respiratory distress  Cardiovascular: normal rate, regular rhythm, normal S1 and S2, no murmurs, rubs, clicks, or gallops, distal pulses intact, no carotid bruits  Abdomen: soft, non-tender, non-distended, normal bowel sounds, no masses or organomegaly  Extremities: no cyanosis, clubbing or edema  Musculoskeletal: normal range of motion, no joint swelling, deformity or tenderness  Neurologic: reflexes normal and symmetric, no cranial nerve deficit, gait, coordination and speech normal    Patient's complete Health Risk Assessment and screening values have been reviewed and are found in Flowsheets. The following problems were reviewed today and where indicated follow up appointments were made and/or referrals ordered. Positive Risk Factor Screenings with Interventions:          General Health and ACP:  General  In general, how would you say your health is?: Fair  In the past 7 days, have you experienced any of the following?  New or Increased Pain, New or Increased Fatigue, Loneliness, Social Isolation, Stress or Anger?: (!) Loneliness  Do you get the social and emotional support that you need?: Yes  Do you have a Living Will?: (!) No  Advance Directives     Power of  Living Will ACP-Advance Directive ACP-Power of     Not on File Not on File Not on File Not on File      General Health Risk Interventions:  · Loneliness: patient's comments regarding inadequate social support: likes dogs more than he likes people    Health Habits/Nutrition:  Health Habits/Nutrition  Do you exercise for at least 20 minutes 2-3 times per week?: (!) No  Have you lost any weight without trying in the past 3 months?: No  Do you eat only one meal per day?: No  Have you seen the dentist within the past year?: (!) No  Body mass index: (!) 28.81  Health Habits/Nutrition Interventions:  · Inadequate physical activity:  educational materials provided to promote increased physical activity  · Dental exam overdue:  patient encouraged to make appointment with his/her dentist    Hearing/Vision:  No exam data present  Hearing/Vision  Do you or your family notice any trouble with your hearing that hasn't been managed with hearing aids?: (!) Yes  Do you have difficulty driving, watching TV, or doing any of your daily activities because of your eyesight?: No  Have you had an eye exam within the past year?: (!) No  Hearing/Vision Interventions:  · Hearing concerns:  given patient handout  · Vision concerns:  patient encouraged to make appointment with his/her eye specialist      Personalized Preventive Plan   Current Health Maintenance Status  Immunization History   Administered Date(s) Administered    Pneumococcal Polysaccharide ([id number]) 08/02/2016    Tdap (Boostrix, Adacel) 08/02/2016        Health Maintenance   Topic Date Due    AAA screen  Never done    Hepatitis B vaccine (1 of 3 - Risk 3-dose series) Never done    Shingles Vaccine (1 of 2) Never done    Low dose CT lung screening  Never done    Lipid screen  11/19/2020    Potassium monitoring  11/19/2020    Creatinine monitoring  11/19/2020    PSA counseling  11/19/2020    Annual Wellness Visit (AWV)  Never done    Pneumococcal 65+ years Vaccine (1 of 1 - PPSV23) 08/02/2021    Flu vaccine (Season Ended) 09/01/2021    DTaP/Tdap/Td vaccine (2 - Td or Tdap) 08/02/2026    Colon cancer screen colonoscopy  02/20/2029    COVID-19 Vaccine  Completed    Hepatitis A vaccine  Aged Out    Hib vaccine  Aged Out    Meningococcal (ACWY) vaccine  Aged Out     Recommendations for [place] Due: see orders and patient instructions/AVS.  . Recommended screening schedule for the next 5-10 years is provided to the patient in written form: see Patient Instructions/AVS.    Nancy Paez was seen today for medicare awv.     Diagnoses and all orders for this visit:    Routine general medical examination at a health care facility

## 2021-09-12 ENCOUNTER — TELEPHONE (OUTPATIENT)
Dept: PRIMARY CARE CLINIC | Age: 66
End: 2021-09-12

## 2021-09-12 DIAGNOSIS — F33.0 MAJOR DEPRESSIVE DISORDER, RECURRENT, MILD (HCC): ICD-10-CM

## 2021-09-12 NOTE — TELEPHONE ENCOUNTER
----- Message from Bassem Rosen sent at 9/11/2021  3:10 PM EDT -----  Subject: Message to Provider    QUESTIONS  Information for Provider? Patient would like to be back on the Lexapro,   possibly with a higher dosage, because the generic is not working for him. Pls contact him regarding his request.   ---------------------------------------------------------------------------  --------------  CALL BACK INFO  What is the best way for the office to contact you? OK to leave message on   voicemail  Preferred Call Back Phone Number? [phone]  ---------------------------------------------------------------------------  --------------  SCRIPT ANSWERS  Relationship to Patient?  Self

## 2021-09-13 RX ORDER — ESCITALOPRAM OXALATE 20 MG/1
30 TABLET ORAL DAILY
Qty: 45 TABLET | Refills: 1 | Status: SHIPPED | OUTPATIENT
Start: 2021-09-13 | End: 2022-05-24

## 2021-09-13 NOTE — TELEPHONE ENCOUNTER
I will increase the dose and go back to the Lexapro but lets get Kalyn Macias in for a follow up visit a month from now

## 2021-09-30 ENCOUNTER — OFFICE VISIT (OUTPATIENT)
Dept: FAMILY MEDICINE CLINIC | Age: 66
End: 2021-09-30
Payer: MEDICARE

## 2021-09-30 VITALS
HEART RATE: 71 BPM | DIASTOLIC BLOOD PRESSURE: 80 MMHG | BODY MASS INDEX: 26.98 KG/M2 | OXYGEN SATURATION: 93 % | SYSTOLIC BLOOD PRESSURE: 130 MMHG | WEIGHT: 172.3 LBS

## 2021-09-30 DIAGNOSIS — I10 ESSENTIAL HYPERTENSION: Primary | ICD-10-CM

## 2021-09-30 DIAGNOSIS — F17.200 NICOTINE DEPENDENCE, UNCOMPLICATED, UNSPECIFIED NICOTINE PRODUCT TYPE: ICD-10-CM

## 2021-09-30 DIAGNOSIS — E78.5 DYSLIPIDEMIA: ICD-10-CM

## 2021-09-30 DIAGNOSIS — J44.9 CHRONIC OBSTRUCTIVE PULMONARY DISEASE, UNSPECIFIED COPD TYPE (HCC): ICD-10-CM

## 2021-09-30 PROCEDURE — G8417 CALC BMI ABV UP PARAM F/U: HCPCS | Performed by: FAMILY MEDICINE

## 2021-09-30 PROCEDURE — 3023F SPIROM DOC REV: CPT | Performed by: FAMILY MEDICINE

## 2021-09-30 PROCEDURE — 3017F COLORECTAL CA SCREEN DOC REV: CPT | Performed by: FAMILY MEDICINE

## 2021-09-30 PROCEDURE — 4040F PNEUMOC VAC/ADMIN/RCVD: CPT | Performed by: FAMILY MEDICINE

## 2021-09-30 PROCEDURE — 1123F ACP DISCUSS/DSCN MKR DOCD: CPT | Performed by: FAMILY MEDICINE

## 2021-09-30 PROCEDURE — G8926 SPIRO NO PERF OR DOC: HCPCS | Performed by: FAMILY MEDICINE

## 2021-09-30 PROCEDURE — G8427 DOCREV CUR MEDS BY ELIG CLIN: HCPCS | Performed by: FAMILY MEDICINE

## 2021-09-30 PROCEDURE — 99214 OFFICE O/P EST MOD 30 MIN: CPT | Performed by: FAMILY MEDICINE

## 2021-09-30 PROCEDURE — 1036F TOBACCO NON-USER: CPT | Performed by: FAMILY MEDICINE

## 2021-09-30 RX ORDER — UMECLIDINIUM BROMIDE AND VILANTEROL TRIFENATATE 62.5; 25 UG/1; UG/1
1 POWDER RESPIRATORY (INHALATION) DAILY
Qty: 28 EACH | Refills: 0 | COMMUNITY
Start: 2021-09-30

## 2021-09-30 ASSESSMENT — COPD QUESTIONNAIRES: COPD: 1

## 2021-09-30 ASSESSMENT — PATIENT HEALTH QUESTIONNAIRE - PHQ9
2. FEELING DOWN, DEPRESSED OR HOPELESS: 0
1. LITTLE INTEREST OR PLEASURE IN DOING THINGS: 0
SUM OF ALL RESPONSES TO PHQ QUESTIONS 1-9: 0
SUM OF ALL RESPONSES TO PHQ9 QUESTIONS 1 & 2: 0

## 2021-09-30 ASSESSMENT — ENCOUNTER SYMPTOMS
BLURRED VISION: 0
ORTHOPNEA: 0

## 2021-09-30 NOTE — PATIENT INSTRUCTIONS
Patient Education        Allergies: Care Instructions  Overview     Allergies occur when your body's defense system (immune system) overreacts to certain substances. The immune system treats a harmless substance as if it were a harmful germ or virus. Many things can make this happen. These include pollens, medicine, food, dust, animal dander, and mold. Allergies can be mild or severe. Mild allergies can be managed with home treatment. But medicine may be needed to prevent problems. Managing your allergies is an important part of staying healthy. Your doctor may suggest that you have allergy testing to help find out what is causing your allergies. Severe allergies can cause reactions that affect your whole body (anaphylactic reactions). Your doctor may prescribe a shot of epinephrine to carry with you in case you have a severe reaction. Learn how to give yourself the shot and keep it with you at all times. Make sure it is not . Follow-up care is a key part of your treatment and safety. Be sure to make and go to all appointments, and call your doctor if you are having problems. It's also a good idea to know your test results and keep a list of the medicines you take. How can you care for yourself at home? · If you have been told by your doctor that dust or dust mites are causing your allergy, decrease the dust around your bed:  ? Wash sheets, pillowcases, and other bedding in hot water every week. ? Use dust-proof covers for pillows, duvets, and mattresses. Avoid plastic covers because they tear easily and do not \"breathe. \" Wash as instructed on the label. ? Do not use any blankets and pillows that you do not need. ? Use blankets that you can wash in your washing machine. ? Consider removing drapes and carpets, which attract and hold dust, from your bedroom. · If you are allergic to house dust and mites, do not use home humidifiers. Your doctor can suggest ways you can control dust and mites.   · Look for signs of cockroaches. Cockroaches cause allergic reactions. Use cockroach baits to get rid of them. Then, clean your home well. Cockroaches like areas where grocery bags, newspapers, empty bottles, or cardboard boxes are stored. Do not keep these inside your home, and keep trash and food containers sealed. Seal off any spots where cockroaches might enter your home. · If you are allergic to mold, get rid of furniture, rugs, and drapes that smell musty. Check for mold in the bathroom. · If you are allergic to outdoor pollen or mold spores, use air-conditioning. Change or clean all filters every month. Keep windows closed. · If you are allergic to pollen, stay inside when pollen counts are high. Use a vacuum  with a HEPA filter or a double-thickness filter at least two times each week. · Stay inside when air pollution is bad. Avoid paint fumes, perfumes, and other strong odors. · Avoid conditions that make your allergies worse. Stay away from smoke. Do not smoke or let anyone else smoke in your house. Do not use fireplaces or wood-burning stoves. · If you are allergic to your pets, change the air filter in your furnace every month. Use high-efficiency filters. · If you are allergic to pet dander, keep pets outside or out of your bedroom. Old carpet and cloth furniture can hold a lot of animal dander. You may need to replace them. When should you call for help? Give an epinephrine shot if:    · You think you are having a severe allergic reaction.     · You have symptoms in more than one body area, such as mild nausea and an itchy mouth. After giving an epinephrine shot call 911, even if you feel better. Call 911 if:    · You have symptoms of a severe allergic reaction. These may include:  ? Sudden raised, red areas (hives) all over your body. ? Swelling of the throat, mouth, lips, or tongue. ? Trouble breathing. ? Passing out (losing consciousness).  Or you may feel very lightheaded or suddenly feel weak, confused, or restless.     · You have been given an epinephrine shot, even if you feel better. Call your doctor now or seek immediate medical care if:    · You have symptoms of an allergic reaction, such as:  ? A rash or hives (raised, red areas on the skin). ? Itching. ? Swelling. ? Belly pain, nausea, or vomiting. Watch closely for changes in your health, and be sure to contact your doctor if:    · You do not get better as expected. Where can you learn more? Go to https://Ease My Sell[employer]el ([email or web address]. org and sign in to your [employer] account. Enter B297 in the Zamzee box to learn more about \"Allergies: Care Instructions. \"     If you do not have an account, please click on the \"Sign Up Now\" link. Current as of: February 10, 2021               Content Version: 13.0  © 2006-2021 Healthwise, Incorporated. Care instructions adapted under license by [facility] ([facility]). If you have questions about a medical condition or this instruction, always ask your healthcare professional. [address] any warranty or liability for your use of this information.

## 2021-09-30 NOTE — PROGRESS NOTES
AP[facility] Progress Note    Date:9/30/2021         Patient Name:Sukh Castillo     YOB: 1955     Age:66 y.o. Assessment/Plan        Problem List Items Addressed This Visit        Circulatory    Hypertension - Primary      Well-controlled, continue current medications and continue current treatment plan            Respiratory    Chronic obstructive pulmonary disease (HCC)      Borderline controlled, continue current medications and continue current treatment plan         Relevant Medications    umeclidinium-vilanterol (ANORO ELLIPTA) 62.5-25 MCG/INH AEPB inhaler       Other    Dyslipidemia      Well-controlled, continue current medications and continue current treatment plan         Nicotine dependence, uncomplicated     Stable                Return in about 6 months (around 3/30/2022). Electronically signed by Olive Cogan, DO on 9/30/21         Sara Lauren is a 77 y.o. male presenting today for   Chief Complaint   Patient presents with    3 Month Follow-Up    Discuss Medications   . COPD  This is a chronic problem. The current episode started more than 1 year ago. The problem occurs intermittently. The problem has been waxing and waning. His symptoms are alleviated by beta-agonist. He reports minimal improvement on treatment. Hypertension  This is a chronic problem. The current episode started more than 1 year ago. The problem has been gradually improving since onset. The problem is uncontrolled. Pertinent negatives include no anxiety, blurred vision, neck pain, orthopnea, palpitations or peripheral edema. Past treatments include angiotensin blockers. The current treatment provides moderate improvement. There is no history of chronic renal disease. Hyperlipidemia  This is a chronic problem. The current episode started more than 1 year ago. He has no history of chronic renal disease, diabetes, hypothyroidism, liver disease, obesity or nephrotic syndrome.  Pertinent negatives include no focal sensory loss, focal weakness or leg pain. Current antihyperlipidemic treatment includes statins. Review of Systems   Review of Systems   Constitutional: Negative. HENT: Negative. Eyes: Negative. Negative for blurred vision. Respiratory: Negative. Cardiovascular: Negative. Negative for palpitations and orthopnea. Gastrointestinal: Negative. Endocrine: Negative. Genitourinary: Negative. Musculoskeletal: Negative. Negative for neck pain. Skin: Negative. Allergic/Immunologic: Negative. Neurological: Negative. Negative for focal weakness. Hematological: Negative. Psychiatric/Behavioral: Negative. All other systems reviewed and are negative. Medications     Current Outpatient Medications   Medication Sig Dispense Refill    escitalopram (LEXAPRO) 20 MG tablet Take 1.5 tablets by mouth daily 45 tablet 1    albuterol sulfate  (90 Base) MCG/ACT inhaler INHALE TWO PUFFS BY MOUTH EVERY 4 HOURS AS NEEDED FOR WHEEZING 18 g 5    Budeson-Glycopyrrol-Formoterol (BREZTRI AEROSPHERE) 160-9-4.8 MCG/ACT AERO Inhale 2 each into the lungs 2 times daily 2 Inhaler 0    losartan (COZAAR) 50 MG tablet Take 1 tablet by mouth daily 30 tablet 2    montelukast (SINGULAIR) 10 MG tablet Take 1 tablet by mouth daily 30 tablet 2    atorvastatin (LIPITOR) 10 MG tablet Take 1 tablet by mouth daily 30 tablet 2    mometasone (NASONEX) 50 MCG/ACT nasal spray 2 sprays by Nasal route daily 1 Inhaler 3    buPROPion (WELLBUTRIN XL) 150 MG extended release tablet Take 1 tablet by mouth every morning (Patient not taking: Reported on 9/30/2021) 30 tablet 3     No current facility-administered medications for this visit. Past History    Past Medical History:   has a past medical history of Allergic rhinitis, Asthma, Depression, Dyslipidemia, Erectile dysfunction, Hepatitis C, Hx of rotator cuff surgery, and Hypertension.     Social History:   reports that he quit smoking about 2 years ago. His smoking use included cigarettes. He started smoking about 50 years ago. He has a 40.00 pack-year smoking history. He has quit using smokeless tobacco. He reports current alcohol use of about 7.0 standard drinks of alcohol per week. He reports that he does not use drugs. Family History:   Family History   Problem Relation Age of Onset    Lung Cancer Mother     Heart Attack Father     Cancer Father         Throat       Surgical History:   Past Surgical History:   Procedure Laterality Date    COLONOSCOPY      COLONOSCOPY  02/20/2019    polypectomy    COLONOSCOPY N/A 2/20/2019    COLONOSCOPY POLYPECTOMY SNARE/COLD BIOPSY performed by Howie Hendrickson [facility] at [facility]  01/26/2017    robotic radical prostatectomy with pelvic node dissection    ROTATOR CUFF REPAIR Right 2001 ? Physical Examination      Vitals:  BP (!) 140/80 (Site: Right Lower Arm, Position: Sitting, Cuff Size: Medium Adult)   Pulse 71   Wt 172 lb 4.8 oz (78.2 kg)   SpO2 93%   BMI 26.98 kg/m²     Physical Exam  Vitals and nursing note reviewed. Constitutional:       General: He is not in acute distress. Appearance: Normal appearance. He is normal weight. He is not ill-appearing, toxic-appearing or diaphoretic. HENT:      Head: Normocephalic and atraumatic. Right Ear: External ear normal.      Left Ear: External ear normal.   Eyes:      General: No scleral icterus. Right eye: No discharge. Left eye: No discharge. Conjunctiva/sclera: Conjunctivae normal.   Cardiovascular:      Rate and Rhythm: Normal rate and regular rhythm. Pulses: Normal pulses. Heart sounds: Normal heart sounds. No murmur heard. No friction rub. No gallop. Pulmonary:      Effort: Pulmonary effort is normal. No respiratory distress. Breath sounds: Normal breath sounds. No stridor. No wheezing, rhonchi or rales. Chest:      Chest wall: No tenderness.    Skin:     General: Skin is warm. Coloration: Skin is not jaundiced or pale. Neurological:      Mental Status: He is alert and oriented to person, place, and time. Mental status is at baseline. Psychiatric:         Mood and Affect: Mood normal.         Behavior: Behavior normal.         Thought Content: Thought content normal.         Judgment: Judgment normal.         Labs/Imaging/Diagnostics   Labs:  Hemoglobin A1C   Date Value Ref Range Status   11/19/2019 5.5 4.0 - 6.0 % Final       Imaging Last 24 Hours:  Echocardiogram 2D W M-Mode  [facility]    Transthoracic Echocardiography Report (TTE)     Patient Name Eleuterio Rainey Date of Study               02/14/2018                J      Date of      1955  Gender                      Male   Birth      Age          61 year(s)  Race                              Room Number  OP          Height:                     67 inch, 170.18 cm      Corporate ID [id number]  Weight:                     185 pounds, 83.9 kg   #      Patient Acct [de-identified]   BSA:          1.96 m^2      BMI:      28.98   #                                                              kg/m^2      MR #         [id number]     Sonographer                 [id number]      Accession #  [id number]   Interpreting Physician      [address]      Fellow                   Referring Nurse                            Practitioner      Interpreting             Referring Physician         Felipe Cotton     Type of Study      TTE procedure:2D Echocardiogram, M-Mode, Doppler, Color Doppler. Procedure Date  Date: 02/14/2018 Start: 08:20 AM    Study Location: [address] [place] [address]  Technical Quality: Adequate visualization    Indications:Dyspnea/SOB.     Patient Status: Inpatient    Height: 67 inches Weight: 185 pounds BSA: 1.96 m^2 BMI: 28.98 kg/m^2    CONCLUSIONS    Summary  Left ventricle is normal in size  Global left ventricular systolic function is normal  Estimated ejection fraction is 60 % .  No significant valvular regurgitation or stenosis seen. No pericardial effusion seen. Normal aortic root dimension. Signature  ----------------------------------------------------------------------------   Electronically signed by Patric Guerra on 2018   08:44 AM  ----------------------------------------------------------------------------    ----------------------------------------------------------------------------   Electronically signed by Luis Alberto Palomino(Interpreting physician) on 2018   09:43 AM  ----------------------------------------------------------------------------  FINDINGS  Left Atrium  Left atrium is at upper limits of normal.  Left Ventricle  Left ventricle is normal in size  Global left ventricular systolic function is normal  Estimated ejection fraction is 60 % . Right Atrium  Right atrium is normal in size. Right Ventricle  Normal right ventricular size and function. Mitral Valve  Normal mitral valve structure and function. Trivial mitral regurgitation. Aortic Valve  Normal aortic valve structure and function without stenosis or  regurgitation. Tricuspid Valve  Normal tricuspid valve structure and function. Pulmonic Valve  The pulmonic valve is normal in structure. No pulmonic insufficiency. Pericardial Effusion  No pericardial effusion seen. Miscellaneous  Normal aortic root dimension.     M-mode / 2D Measurements & Calculations:      LVIDd:5.33 cm(3.7 - 5.6 cm)      Diastolic EROPMY:142.5 ml   LVIDs:3.76 cm(2.2 - 4.0 cm)      Systolic GQBHFA:10.6 ml   IVSd:1.17 cm(0.6 - 1.1 cm)       Aortic Root:3 cm(2.0 - 3.7 cm)   LVPWd:0.73 cm(0.6 - 1.1 cm)      LA Dimension: 4.2 cm(1.9 - 4.0 cm)   Fractional Shortenin.46 %   Calculated LVEF (%): 63.19 %      Mitral:                                    Aortic      Valve Area (P1/2-Time): 1.67 cm^2          Peak Velocity: 1.26 m/s   Peak E-Wave: 0.79 m/s                      Peak Gradient: 6.35 mmHg   Peak A-Wave: 0.94 m/s   E/A Ratio: 0.85   Peak Gradient: 2.5 mmHg   Deceleration Time: 430 msec   P1/2t: 132 msec     Septal Wall E' velocity:0.06 m/s  Lateral Wall E' velocity:0.04 m/s

## 2021-10-15 DIAGNOSIS — J44.9 CHRONIC OBSTRUCTIVE PULMONARY DISEASE, UNSPECIFIED COPD TYPE (HCC): ICD-10-CM

## 2021-10-15 RX ORDER — ALBUTEROL SULFATE 90 UG/1
AEROSOL, METERED RESPIRATORY (INHALATION)
Qty: 18 G | Refills: 5 | Status: SHIPPED | OUTPATIENT
Start: 2021-10-15 | End: 2022-10-07

## 2021-10-15 NOTE — TELEPHONE ENCOUNTER
Pt has been out of this inhaler for 2 days.           Rea Topete is calling to request a refill on the following medication(s):    Medication Request:  Requested Prescriptions     Pending Prescriptions Disp Refills    albuterol sulfate  (90 Base) MCG/ACT inhaler 18 g 5     Sig: INHALE TWO PUFFS BY MOUTH EVERY 4 HOURS AS NEEDED FOR WHEEZING       Last Visit Date (If Applicable):  6/59/9424    Next Visit Date:    Visit date not found

## 2021-10-15 NOTE — TELEPHONE ENCOUNTER
Alessio Ramires is calling to request a refill on the following medication(s):    Medication Request:  Requested Prescriptions     Pending Prescriptions Disp Refills    albuterol sulfate  (90 Base) MCG/ACT inhaler [Pharmacy Med Name: ALBUTEROL HFA 90 MCG INHALER] 18 g 5     Sig: INHALE TWO PUFFS BY MOUTH EVERY 4 HOURS AS NEEDED FOR WHEEZING       Last Visit Date (If Applicable):  1/16/4626    Next Visit Date:    Visit date not found

## 2021-10-20 ASSESSMENT — ENCOUNTER SYMPTOMS
ALLERGIC/IMMUNOLOGIC NEGATIVE: 1
RESPIRATORY NEGATIVE: 1
EYES NEGATIVE: 1
GASTROINTESTINAL NEGATIVE: 1

## 2022-05-24 DIAGNOSIS — F33.0 MAJOR DEPRESSIVE DISORDER, RECURRENT, MILD (HCC): ICD-10-CM

## 2022-05-24 RX ORDER — ESCITALOPRAM OXALATE 20 MG/1
TABLET ORAL
Qty: 90 TABLET | Refills: 1 | Status: SHIPPED | OUTPATIENT
Start: 2022-05-24

## 2022-05-24 NOTE — TELEPHONE ENCOUNTER
Karla Lynn is calling to request a refill on the following medication(s):    Medication Request:  Requested Prescriptions     Pending Prescriptions Disp Refills    escitalopram (LEXAPRO) 20 MG tablet [Pharmacy Med Name: ESCITALOPRAM 20 MG TABLET] 90 tablet      Sig: TAKE ONE TABLET BY MOUTH DAILY       Last Visit Date (If Applicable):  0/50/4908    Next Visit Date:    Visit date not found

## 2022-06-08 DIAGNOSIS — J45.20 MILD INTERMITTENT ASTHMA WITHOUT COMPLICATION: Primary | ICD-10-CM

## 2022-06-08 NOTE — LETTER
[address]  [address] [facility] [address]    6/9/2022    [address] [facility]        Dear Bibi Steele :    In addition to helping you feel better when you are sick, we are interested in preventing illness and injury in the first place. In the spirit of maintaining your good health, your record indicates that you are due for an appointment. Please call [phone] to schedule. I look forward to seeing you soon.     Sincerely,         Ana Narayanan,  / Ginette Stroud

## 2022-06-09 RX ORDER — ALBUTEROL SULFATE 90 UG/1
AEROSOL, METERED RESPIRATORY (INHALATION)
Qty: 18 G | Refills: 5 | Status: SHIPPED | OUTPATIENT
Start: 2022-06-09 | End: 2022-09-29 | Stop reason: SDUPTHER

## 2022-06-09 NOTE — TELEPHONE ENCOUNTER
Letter sent to Pt requesting drew.        Marino Wolf is calling to request a refill on the following medication(s):    Medication Request:  Requested Prescriptions     Pending Prescriptions Disp Refills    albuterol sulfate HFA (PROVENTIL;VENTOLIN;PROAIR) 108 (90 Base) MCG/ACT inhaler [Pharmacy Med Name: ALBUTEROL HFA 90 MCG INHALER] 18 g      Sig: INHALE TWO PUFFS BY MOUTH EVERY 4 HOURS AS NEEDED FOR WHEEZING       Last Visit Date (If Applicable):  4/15/8715    Next Visit Date:    Visit date not found

## 2022-09-22 RX ORDER — ALBUTEROL SULFATE 90 UG/1
AEROSOL, METERED RESPIRATORY (INHALATION)
Qty: 8.5 G | OUTPATIENT
Start: 2022-09-22

## 2022-09-29 DIAGNOSIS — J45.20 MILD INTERMITTENT ASTHMA WITHOUT COMPLICATION: ICD-10-CM

## 2022-09-29 RX ORDER — ALBUTEROL SULFATE 90 UG/1
AEROSOL, METERED RESPIRATORY (INHALATION)
Qty: 18 G | Refills: 5 | Status: SHIPPED | OUTPATIENT
Start: 2022-09-29

## 2022-09-30 NOTE — TELEPHONE ENCOUNTER
Glen Carl is calling to request a refill on the following medication(s):    Medication Request:  Requested Prescriptions     Pending Prescriptions Disp Refills    albuterol sulfate HFA (PROVENTIL;VENTOLIN;PROAIR) 108 (90 Base) MCG/ACT inhaler [Pharmacy Med Name: ALBUTEROL HFA 90 MCG INHALER] 8.5 g      Sig: inhale 2 puffs by mouth every 4 hours if needed for wheezing       Last Visit Date (If Applicable):  0/74/8095    Next Visit Date:    10/7/2022

## 2022-10-02 RX ORDER — ALBUTEROL SULFATE 90 UG/1
AEROSOL, METERED RESPIRATORY (INHALATION)
Qty: 8.5 G | Refills: 1 | Status: SHIPPED | OUTPATIENT
Start: 2022-10-02 | End: 2022-10-07 | Stop reason: ALTCHOICE

## 2022-10-07 ENCOUNTER — OFFICE VISIT (OUTPATIENT)
Dept: FAMILY MEDICINE CLINIC | Age: 67
End: 2022-10-07
Payer: MEDICARE

## 2022-10-07 VITALS
WEIGHT: 179 LBS | SYSTOLIC BLOOD PRESSURE: 130 MMHG | HEART RATE: 59 BPM | BODY MASS INDEX: 28.03 KG/M2 | TEMPERATURE: 97 F | OXYGEN SATURATION: 98 % | DIASTOLIC BLOOD PRESSURE: 80 MMHG

## 2022-10-07 DIAGNOSIS — R53.83 FATIGUE, UNSPECIFIED TYPE: ICD-10-CM

## 2022-10-07 DIAGNOSIS — I10 PRIMARY HYPERTENSION: Primary | ICD-10-CM

## 2022-10-07 DIAGNOSIS — Z09 HOSPITAL DISCHARGE FOLLOW-UP: ICD-10-CM

## 2022-10-07 DIAGNOSIS — E78.5 DYSLIPIDEMIA: ICD-10-CM

## 2022-10-07 DIAGNOSIS — F33.0 MAJOR DEPRESSIVE DISORDER, RECURRENT, MILD (HCC): ICD-10-CM

## 2022-10-07 DIAGNOSIS — M54.12 RIGHT CERVICAL RADICULOPATHY: ICD-10-CM

## 2022-10-07 DIAGNOSIS — R73.01 IFG (IMPAIRED FASTING GLUCOSE): ICD-10-CM

## 2022-10-07 DIAGNOSIS — Z85.46 HISTORY OF PROSTATE CANCER: ICD-10-CM

## 2022-10-07 DIAGNOSIS — Z12.5 PROSTATE CANCER SCREENING: ICD-10-CM

## 2022-10-07 DIAGNOSIS — J45.20 MILD INTERMITTENT ASTHMA WITHOUT COMPLICATION: ICD-10-CM

## 2022-10-07 DIAGNOSIS — J44.9 CHRONIC OBSTRUCTIVE PULMONARY DISEASE, UNSPECIFIED COPD TYPE (HCC): ICD-10-CM

## 2022-10-07 PROCEDURE — G8427 DOCREV CUR MEDS BY ELIG CLIN: HCPCS | Performed by: FAMILY MEDICINE

## 2022-10-07 PROCEDURE — 96372 THER/PROPH/DIAG INJ SC/IM: CPT | Performed by: FAMILY MEDICINE

## 2022-10-07 PROCEDURE — 1111F DSCHRG MED/CURRENT MED MERGE: CPT | Performed by: FAMILY MEDICINE

## 2022-10-07 PROCEDURE — 3023F SPIROM DOC REV: CPT | Performed by: FAMILY MEDICINE

## 2022-10-07 PROCEDURE — G8417 CALC BMI ABV UP PARAM F/U: HCPCS | Performed by: FAMILY MEDICINE

## 2022-10-07 PROCEDURE — 99214 OFFICE O/P EST MOD 30 MIN: CPT | Performed by: FAMILY MEDICINE

## 2022-10-07 PROCEDURE — 1036F TOBACCO NON-USER: CPT | Performed by: FAMILY MEDICINE

## 2022-10-07 PROCEDURE — 1123F ACP DISCUSS/DSCN MKR DOCD: CPT | Performed by: FAMILY MEDICINE

## 2022-10-07 PROCEDURE — G8484 FLU IMMUNIZE NO ADMIN: HCPCS | Performed by: FAMILY MEDICINE

## 2022-10-07 PROCEDURE — 3017F COLORECTAL CA SCREEN DOC REV: CPT | Performed by: FAMILY MEDICINE

## 2022-10-07 RX ORDER — TIZANIDINE 4 MG/1
4 TABLET ORAL EVERY 6 HOURS PRN
COMMUNITY
Start: 2022-09-30

## 2022-10-07 RX ORDER — TRIAMCINOLONE ACETONIDE 40 MG/ML
40 INJECTION, SUSPENSION INTRA-ARTICULAR; INTRAMUSCULAR ONCE
Status: COMPLETED | OUTPATIENT
Start: 2022-10-07 | End: 2022-10-07

## 2022-10-07 RX ADMIN — TRIAMCINOLONE ACETONIDE 40 MG: 40 INJECTION, SUSPENSION INTRA-ARTICULAR; INTRAMUSCULAR at 10:47

## 2022-10-07 SDOH — ECONOMIC STABILITY: FOOD INSECURITY: WITHIN THE PAST 12 MONTHS, YOU WORRIED THAT YOUR FOOD WOULD RUN OUT BEFORE YOU GOT MONEY TO BUY MORE.: NEVER TRUE

## 2022-10-07 SDOH — ECONOMIC STABILITY: FOOD INSECURITY: WITHIN THE PAST 12 MONTHS, THE FOOD YOU BOUGHT JUST DIDN'T LAST AND YOU DIDN'T HAVE MONEY TO GET MORE.: NEVER TRUE

## 2022-10-07 ASSESSMENT — PATIENT HEALTH QUESTIONNAIRE - PHQ9
10. IF YOU CHECKED OFF ANY PROBLEMS, HOW DIFFICULT HAVE THESE PROBLEMS MADE IT FOR YOU TO DO YOUR WORK, TAKE CARE OF THINGS AT HOME, OR GET ALONG WITH OTHER PEOPLE: 0
SUM OF ALL RESPONSES TO PHQ9 QUESTIONS 1 & 2: 0
1. LITTLE INTEREST OR PLEASURE IN DOING THINGS: 0
4. FEELING TIRED OR HAVING LITTLE ENERGY: 0
2. FEELING DOWN, DEPRESSED OR HOPELESS: 0
SUM OF ALL RESPONSES TO PHQ QUESTIONS 1-9: 0
8. MOVING OR SPEAKING SO SLOWLY THAT OTHER PEOPLE COULD HAVE NOTICED. OR THE OPPOSITE, BEING SO FIGETY OR RESTLESS THAT YOU HAVE BEEN MOVING AROUND A LOT MORE THAN USUAL: 0
SUM OF ALL RESPONSES TO PHQ QUESTIONS 1-9: 0
6. FEELING BAD ABOUT YOURSELF - OR THAT YOU ARE A FAILURE OR HAVE LET YOURSELF OR YOUR FAMILY DOWN: 0
SUM OF ALL RESPONSES TO PHQ QUESTIONS 1-9: 0
7. TROUBLE CONCENTRATING ON THINGS, SUCH AS READING THE NEWSPAPER OR WATCHING TELEVISION: 0
5. POOR APPETITE OR OVEREATING: 0
3. TROUBLE FALLING OR STAYING ASLEEP: 0
9. THOUGHTS THAT YOU WOULD BE BETTER OFF DEAD, OR OF HURTING YOURSELF: 0
SUM OF ALL RESPONSES TO PHQ QUESTIONS 1-9: 0

## 2022-10-07 ASSESSMENT — ENCOUNTER SYMPTOMS
ORTHOPNEA: 0
EYES NEGATIVE: 1
RESPIRATORY NEGATIVE: 1
GASTROINTESTINAL NEGATIVE: 1
ALLERGIC/IMMUNOLOGIC NEGATIVE: 1
BLURRED VISION: 0

## 2022-10-07 ASSESSMENT — COPD QUESTIONNAIRES: COPD: 1

## 2022-10-07 ASSESSMENT — SOCIAL DETERMINANTS OF HEALTH (SDOH): HOW HARD IS IT FOR YOU TO PAY FOR THE VERY BASICS LIKE FOOD, HOUSING, MEDICAL CARE, AND HEATING?: NOT HARD AT ALL

## 2022-10-07 NOTE — PROGRESS NOTES
Post-Discharge Transitional Care Follow Up    Laina Rutledge   YOB: 1955    Date of Office Visit:  10/7/2022  Date of Hospital Admission: see below  Date of Hospital Discharge: see below    Care management risk score Rising risk (score 2-5) and Complex Care (Scores >=6): No Risk Score On File     Non face to face  following discharge, date last encounter closed (first attempt may have been earlier): *No documented post hospital discharge outreach found in the last 14 days     Call initiated 2 business days of discharge: *No response recorded in the last 14 days    ASSESSMENT/PLAN:   Primary hypertension  Assessment & Plan:   Well-controlled, continue current medications, continue current treatment plan, medication adherence emphasized and lifestyle modifications recommended  Orders:  -     CBC with Auto Differential; Future  Chronic obstructive pulmonary disease, unspecified COPD type ([facility]  Assessment & Plan:   Borderline controlled, changes made today: steroid inj today  Orders:  -     CBC with Auto Differential; Future  -     XR CHEST (2 VW); Future  -     triamcinolone acetonide (KENALOG-40) injection 40 mg; 40 mg, IntraMUSCular, ONCE, 1 dose, On Fri 10/7/22 at 1100  Major depressive disorder, recurrent, mild ([facility]  Assessment & Plan:   Well-controlled, continue current medications and continue current treatment plan  Orders:  -     CBC with Auto Differential; Future  Hospital discharge follow-up  -     IN DISCHARGE MEDS RECONCILED W/ CURRENT OUTPATIENT MED LIST  Dyslipidemia  Assessment & Plan:   Unclear control, continue current plan pending work up below  Orders:  -     Lipid Panel; Future  -     CBC with Auto Differential; Future  -     Comprehensive Metabolic Panel;  Future  Mild intermittent asthma without complication  Assessment & Plan:   Borderline controlled, changes made today: steroid inj today  Orders:  -     CBC with Auto Differential; Future  IFG (impaired fasting glucose)  - Hemoglobin A1C; Future  Prostate cancer screening  -     PSA Screening; Future  Right cervical radiculopathy  Assessment & Plan:   Reviewed UC note  Xray  Steroid shot today  Orders:  -     XR CERVICAL SPINE (4-5 VIEWS); Future  -     [facility]  -     triamcinolone acetonide (KENALOG-40) injection 40 mg; 40 mg, IntraMUSCular, ONCE, 1 dose, On Fri 10/7/22 at 1100  History of prostate cancer  Assessment & Plan:   Follow up with urology  Orders:  -     Kole Wright MD, Urology, [place]  Fatigue, unspecified type  -     TSH with Reflex; Future  -     Testosterone; Future      Medical Decision Making: moderate complexity  Return in about 6 months (around 4/7/2023). On this date 10/7/2022 I have spent 40 minutes reviewing previous notes, test results and face to face with the patient discussing the diagnosis and importance of compliance with the treatment plan as well as documenting on the day of the visit. Subjective:   Subjective:     Patient ID: Shabbir Sheldon is a 79 y.o. male. Chief Complaint   Patient presents with   · Shoulder Pain     Shoulder Pain   The pain is present in the right shoulder (Right trapezius). This is a recurrent problem. Episode onset: Three days. There has been no history of extremity trauma. The problem has been waxing and waning. The quality of the pain is described as burning. The pain is at a severity of 6/10. The pain is moderate. Associated symptoms include a limited range of motion. Pertinent negatives include no inability to bear weight, joint locking, joint swelling, numbness or stiffness. Associated symptoms comments: Patient states tingling radiating down his right arm if he turns his head to the right. The symptoms are aggravated by activity. Treatments tried: Ibuprofen 200 mg every 3 hours. The treatment provided no relief.  Patient states he had a right rotator cuff repair 10 years ago     Assessment/Plan:  Chart reviewed  Patient states he has an appointment with his PCP on October 7th  Patient instructed to take medication as prescribed   Patient received an injection of dexamethasone while in clinic with instruction on use     Return to the ER for worsening symptoms such as headache, increased numbness and tingling to right arm, cool extremities, increased swelling, redness, fever      COPD  This is a chronic problem. The current episode started more than 1 year ago. The problem occurs intermittently. The problem has been waxing and waning. His symptoms are alleviated by beta-agonist. He reports minimal improvement on treatment. Hypertension  This is a chronic problem. The current episode started more than 1 year ago. The problem has been gradually improving since onset. The problem is uncontrolled. Pertinent negatives include no anxiety, blurred vision, neck pain, orthopnea, palpitations or peripheral edema. Past treatments include angiotensin blockers. The current treatment provides moderate improvement. There is no history of chronic renal disease. Hyperlipidemia  This is a chronic problem. The current episode started more than 1 year ago. He has no history of chronic renal disease, diabetes, hypothyroidism, liver disease, obesity or nephrotic syndrome. Pertinent negatives include no focal sensory loss, focal weakness or leg pain. Current antihyperlipidemic treatment includes statins. Inpatient course: Discharge summary reviewed- see chart.     Interval history/Current status: stable    Patient Active Problem List   Diagnosis    Allergic rhinitis    Hepatitis C    Dyslipidemia    Hypertension    Erectile dysfunction    Asthma    Hx of rotator cuff surgery    History of prostate cancer    Chronic obstructive pulmonary disease (HCC)    Major depressive disorder, recurrent, mild    Nicotine dependence, uncomplicated    Right cervical radiculopathy       Medication list at time of discharge reviewed: Yes    Medications marked \"taking\" at this time  Outpatient Medications Marked as Taking for the 10/7/22 encounter (Office Visit) with Rehan Yanez, DO   Medication Sig Dispense Refill    tiZANidine (ZANAFLEX) 4 MG tablet Take 4 mg by mouth every 6 hours as needed      albuterol sulfate HFA (PROVENTIL;VENTOLIN;PROAIR) 108 (90 Base) MCG/ACT inhaler INHALE TWO PUFFS BY MOUTH EVERY 4 HOURS AS NEEDED FOR WHEEZING 18 g 5    escitalopram (LEXAPRO) 20 MG tablet TAKE ONE TABLET BY MOUTH DAILY 90 tablet 1    umeclidinium-vilanterol (ANORO ELLIPTA) 62.5-25 MCG/INH AEPB inhaler Inhale 1 puff into the lungs daily 28 each 0    Budeson-Glycopyrrol-Formoterol (BREZTRI AEROSPHERE) 160-9-4.8 MCG/ACT AERO Inhale 2 each into the lungs 2 times daily 2 Inhaler 0    losartan (COZAAR) 50 MG tablet Take 1 tablet by mouth daily 30 tablet 2    atorvastatin (LIPITOR) 10 MG tablet Take 1 tablet by mouth daily 30 tablet 2        Medications patient taking as of now reconciled against medications ordered at time of hospital discharge: Yes    Review of Systems   Constitutional: Negative. HENT: Negative. Eyes: Negative. Negative for blurred vision. Respiratory: Negative. Cardiovascular: Negative. Negative for palpitations and orthopnea. Gastrointestinal: Negative. Endocrine: Negative. Genitourinary: Negative. Musculoskeletal: Negative. Negative for neck pain. Skin: Negative. Allergic/Immunologic: Negative. Neurological: Negative. Negative for focal weakness. Hematological: Negative. Psychiatric/Behavioral: Negative. All other systems reviewed and are negative. Objective:    /80   Pulse 59   Temp 97 °F (36.1 °C) (Temporal)   Wt 179 lb (81.2 kg)   SpO2 98%   BMI 28.03 kg/m²   Physical Exam  Vitals and nursing note reviewed. Constitutional:       General: He is not in acute distress. Appearance: Normal appearance. He is overweight. He is not ill-appearing, toxic-appearing or diaphoretic.    HENT:      Head: Normocephalic and atraumatic. Right Ear: External ear normal.      Left Ear: External ear normal.   Eyes:      General: No scleral icterus. Right eye: No discharge. Left eye: No discharge. Conjunctiva/sclera: Conjunctivae normal.   Cardiovascular:      Rate and Rhythm: Normal rate and regular rhythm. Pulses: Normal pulses. Heart sounds: Normal heart sounds. No murmur heard. No friction rub. No gallop. Pulmonary:      Effort: Pulmonary effort is normal. No respiratory distress. Breath sounds: No stridor. Examination of the right-upper field reveals wheezing. Examination of the left-upper field reveals wheezing. Examination of the right-middle field reveals wheezing. Examination of the left-middle field reveals wheezing. Examination of the right-lower field reveals wheezing. Wheezing present. No rhonchi or rales. Chest:      Chest wall: No tenderness. Musculoskeletal:      Right shoulder: Tenderness present. No swelling, deformity, effusion, laceration, bony tenderness or crepitus. Decreased range of motion. Decreased strength. Normal pulse. Cervical back: Spasms and tenderness present. No swelling, edema, deformity, erythema, signs of trauma, lacerations, rigidity, torticollis, bony tenderness or crepitus. Pain with movement present. Decreased range of motion. Back:    Skin:     General: Skin is warm. Coloration: Skin is not jaundiced or pale. Neurological:      Mental Status: He is alert and oriented to person, place, and time. Mental status is at baseline. Psychiatric:         Mood and Affect: Mood normal.         Behavior: Behavior normal.         Thought Content: Thought content normal.         Judgment: Judgment normal.         An electronic signature was used to authenticate this note.   --Fernie Hobbs, DO

## 2022-10-12 ENCOUNTER — HOSPITAL ENCOUNTER (OUTPATIENT)
Dept: PHYSICAL THERAPY | Facility: CLINIC | Age: 67
Setting detail: THERAPIES SERIES
Discharge: HOME | End: 2022-10-12
Payer: MEDICARE

## 2022-10-12 PROCEDURE — 97161 PT EVAL LOW COMPLEX 20 MIN: CPT

## 2022-10-12 PROCEDURE — 97530 THERAPEUTIC ACTIVITIES: CPT

## 2022-10-12 PROCEDURE — 97110 THERAPEUTIC EXERCISES: CPT

## 2022-10-12 NOTE — CONSULTS
Sean Fall Risk Assessment    Patient Name:  Gavi Contreras  : 1955    Risk Factor Scale  Score   History of Falls [] Yes  [x] No 25  0 0   Secondary Diagnosis [] Yes  [x] No 15  0 0   Ambulatory Aid [] Furniture  [] Crutches/cane/walker  [x] None/bedrest/wheelchair/nurse 30  15  0 0   IV/Heparin Lock [] Yes  [x] No 20  0 0   Gait/Transferring [] Impaired  [] Weak  [x] Normal/bedrest/immobile 20  10  0 0   Mental Status [] Forgets limitations  [x] Oriented to own ability 15  0 0      Total:   0     Based on the Assessment score: check the appropriate box.     [x]  No intervention needed   Low =   Score of 0-24    []  Use standard prevention interventions Moderate =  Score of 24-44   [] Give patient handout and discuss fall prevention strategies   [] Establish goal of education for patient/family RE: fall prevention strategies    []  Use high risk prevention interventions High = Score of 45 and higher   [] Give patient handout and discuss fall prevention strategies   [] Establish goal of education for patient/family Re: fall prevention strategies   [] Discuss lifeline / other resources    Electronically signed by:   Poncho Hernandez PT  Date: 10/12/2022

## 2022-10-12 NOTE — CONSULTS
[] [address]  [address]  [phone]  F: [phone] [x] [address]  [facility]   [address]  P: [phone]  F: [phone] [] [place]  [address]  P: [phone]  F: [phone] [] [address]  P: [phone]  F: [phone] [] [address]  [facility]   [address]   [place]: [phone]  F: [phone]      Physical Therapy Spine Evaluation    Date:  10/12/2022  Patient: Shabbir Sheldon  : 1955  MRN: [id number]  Physician: Lázaro Barrow DO     Insurance: medicare/BC/BS  Medical Diagnosis: cervical radiculopathy    Rehab Codes: M54.13; M25.60; M79.601; R29.3; M62.521; R20.2  Onset Date: 22    Next 's appt. : 23    Subjective:   CC: pain in R scapula, Upper trapezius  and posterior shoulder. It is constant in nature. Every once in awhile pt with heaviness in R UE to fingers and if symptoms are bad he has difficulty turning his head to the right. No headaches, little to no neck pain. Unable to sleep on R shoulder which he prefers. The patient has noticed that he clenches his jaw at times and also when on his L side sleeping, he will notice his head isn't even touching the pillow so he has to make himself relax. HPI: (onset date) 3 week history of R shoulder pain with occasional pain down R UE with heaviness and numbness entire arm to fingers. Pt had been putting down markell prior to this started and he wasn't used to doing this type of activity. The patient was put on ibuprofen and mm relaxers but is only on ibuprofen only. He has had 2 injections not directly in the site of pain.      PMHx:  [x] HTN [x] Cancer: prostate [] Arthritis-unsure [x] Other: R RC repair many years ago. depression, prostatectomy              [x] Refer to full medical chart  In EPIC       Comorbidities:   [x] depression   [x] Asthma/COPD   [x] Other: RC surgery R   [x] Other: prostatectomy     Tests: [x] X-Ray: ordered but not done yet     Medications: [x] Refer to full medical record  [x] Other: tizanidine; has had 2 injections with cortisone with the most recent one 2 days ago. 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 It has been improving so he is doing most of his daily tasks though takes breaks as needed. Gait Prior level of function Current level of function    [x] Independent [x] Independent   Device: [x] Independent [x] Independent     Pain:  [x] Yes  Location: R scapula Pain Rating: (0-10 scale) 2-4/10  Pain altered Tx:  [x] No      Symptoms:  [x] Improving   Better: Worse:   [x] AM   [x] Other: using arms, sleeping  Sleep:   [x] Disturbed- every 4 hrs wakes to take an ibuprofen; sleeps with 1 pillow    Objective:       STRENGTH  ROM    Left Right Cervical    C5 Shld Abd 5 5 Flexion 38   Shld Flexion 5 5 Extension 26   Shld IR 5 5 Rotation L 50 R 55   Shld ER 5 4 Sidebend L 27 R 27   C6 Elb Flex 5 4+ Retraction 50% limited   C7 Elb Ext 5 5 Lumbar nt   C8 EPL   Flexion    T1 Fing Abd/  add 5 5 Extension    (2) 85,80,80# 80,85,80# Rotation L  R      Sidebend L R      UE/LE          15-20% tighter      IR T6 T10     TESTS (+/-) LEFT RIGHT Not Tested   spurlings - - []   Joint Mobility nt  []   Cerv. Comp No pain but some disc  []   Cerv.  Distraction Feels better  []     OBSERVATION No Deficit Deficit Not Tested Comments   Posture       Forward Head [] [x] [] significant   Rounded Shoulders [] [x] [] significant   Kyphosis [] [x] [] increased   Lordosis [x] [] []    Lateral Shift [x] [] []    Scoliosis [x] [] []    Iliac Crest [x] [] []    Slumped Sitting [] [x] []    Palpation [] [x] [] Tightness palpated R Upper trapezius, lateral R cervical, levator scapula, parascapular border, pec on R       Functional Test: NDI Score: 36% functionally impaired     Comments:    Assessment:  the patient is a pleasant 79year old male who is retired about 3 years and presents with R upper trapezius, scapular pain with referred symptoms into the entire R UE. The patient was found to have a significantly guarded posture but without spasms, just a increased tone in holding his shoulders in an elevated position. Also, he has a significant forward head and increased kyphosis. The patient was found to have a significant reduction in cervical movement, especially with retraction. He has mild R biceps and ER weakness but no distal weakness. He is otherwise very strong. Patient would benefit from skilled physical therapy services in order to: improve postural awareness, teach proper body mechanics, receive manual therapy for the reduction of symptoms in the R scapula and UE and improved tone of the upper quadrant mm and tolerate flexibility exercises to address the tightness present. Problems:    [x] ? Pain: 2-4/10 cervical/UT/scapula with referred symptoms to R UE  [x] ? ROM: tolerate flexibility ex and be able to do retraction of c-spine (I) with at least 60% of normal movement to decrease stress to c-spine. [x] ? Strength: upper back/scapular mm  [x] ? Function: has to modify or take breaks to perform   [x] Other: increased mm tone due to chronic sub-maximal contraction     STG: (to be met in 6 treatments)  ? Pain: below 4/10 max with infrequent R UE symptoms  ?  ROM: tolerate flexibility ex and complete them without outside assist  ? Strength: tolerate postural exercises to address weakness  ? Function: sleep restored to baseline  Patient to be independent with home exercise program as demonstrated by performance with correct form without cues. LTG: (to be met in 12  treatments)  Nearly eliminate all UE symptoms. Have pain in R scapula/cervical region be no more than 2/10  Pain below 3/10 max with minimal to 0 UE symptoms  Assume proper posture and body mechanics correctly and independently  Normalize biceps strength on R to indicate improved mm/nn function  Decreased amount of chronic increase in tone mm as above mentioned      Patient goals: \"to fix it\"    Rehab Potential:  [x] Good  [] Fair  [] Poor   Suggested Professional Referral:  [x] No  [] Yes:  Barriers to Goal Achievement:  [] No  [x] Yes: chronic postural compensation  Domestic Concerns:  [x] No  [] Yes:    Pt. Education:  [x] Plans/Goals, Risks/Benefits discussed  [x] Home exercise program  Method of Education: [x] Verbal  [x] Demo  [x] Written +link email so he can watch video  Access Code: [email or web address]  URL: [email or web address]  Date: 10/12/2022  Prepared by: Lizz Hernandez    Exercises  Neck Retraction - 3 x daily - 7 x weekly - 1 sets - 10 reps  Seated Scapular Retraction - 3 x daily - 7 x weekly - 1 sets - 10 reps  Seated Cervical Sidebending AROM - 1 x daily - 7 x weekly - 1 sets - 5 reps - 5 hold    Patient Education  Sleep Positions  Cervical roll  Comprehension of Education:  [x] Verbalizes understanding. [x] Demonstrates understanding with verbal and tactile cueing. [x] Needs Review. [] Demonstrates/verbalizes understanding of HEP/Ed previously given.     Treatment Plan:  [x] Therapeutic Exercise   53026    [x] Therapeutic Activity  81213   [x] Electrical Stimulation Attended  [id number]   [x] Manual Therapy  93909   [x] Instruction in HEP    [x] Cold/hotpack        Frequency:  2 x/week for 12 visits    Todays Treatment:  Modalities: Precautions:  Exercises:  Exercise Reps/ Time Weight/ Level Comments   Retraction cervical 10  Unable to do supine: sitting with verbal/tactile cues   UT stretch ea 5 A    Scapular retraction 10 A    Sleeping posture x           Other: therapeutic activities: use of cervical roll. Pt finds this comfortable    Specific Instructions for next treatment: manual cervical, R UT, scapular releases, pec release. Stretch anterior chest,       Evaluation Complexity:  History (Personal factors, comorbidities) [] 0 [] 1-2 [x] 3+   Exam (limitations, restrictions) [] 1-2 [] 3 [x] 4+   Clinical presentation (progression) [x] Stable [] Evolving  [] Unstable   Decision Making [x] Low [] Moderate [] High    [x] Low Complexity [] Moderate Complexity [] High Complexity       Treatment Charges: Mins Units   [x] Evaluation       [x]  Low       []  Moderate       []  High 35 1   []  Modalities     [x]  Ther Exercise 15 1   []  Manual Therapy     [x]  Ther Activities 10 1   []  Aquatics     []  Vasocompression     []  Other     MEDICARE TRACKING: as of 10/12/22 : $144.99   TOTAL TREATMENT TIME: 60 min    Time in: 12:00 pm      Time out: 1:00 pm    Electronically signed by: Dennie Griffin, PT        Physician Signature:________________________________Date:__________________  By signing above or cosigning this note, I have reviewed this plan of care and certify a need for medically necessary rehabilitation services.      *PLEASE SIGN ABOVE AND FAX BACK ALL PAGES*

## 2022-10-14 ENCOUNTER — HOSPITAL ENCOUNTER (OUTPATIENT)
Dept: GENERAL RADIOLOGY | Age: 67
Discharge: HOME | End: 2022-10-16
Payer: MEDICARE

## 2022-10-14 ENCOUNTER — HOSPITAL ENCOUNTER (OUTPATIENT)
Age: 67
Discharge: HOME | End: 2022-10-14
Payer: MEDICARE

## 2022-10-14 ENCOUNTER — HOSPITAL ENCOUNTER (OUTPATIENT)
Age: 67
Discharge: HOME | End: 2022-10-16
Payer: MEDICARE

## 2022-10-14 DIAGNOSIS — E78.5 DYSLIPIDEMIA: ICD-10-CM

## 2022-10-14 DIAGNOSIS — J45.20 MILD INTERMITTENT ASTHMA WITHOUT COMPLICATION: ICD-10-CM

## 2022-10-14 DIAGNOSIS — R73.01 IFG (IMPAIRED FASTING GLUCOSE): ICD-10-CM

## 2022-10-14 DIAGNOSIS — M54.12 RIGHT CERVICAL RADICULOPATHY: ICD-10-CM

## 2022-10-14 DIAGNOSIS — R53.83 FATIGUE, UNSPECIFIED TYPE: ICD-10-CM

## 2022-10-14 DIAGNOSIS — I10 PRIMARY HYPERTENSION: ICD-10-CM

## 2022-10-14 DIAGNOSIS — Z12.5 PROSTATE CANCER SCREENING: ICD-10-CM

## 2022-10-14 DIAGNOSIS — J44.9 CHRONIC OBSTRUCTIVE PULMONARY DISEASE, UNSPECIFIED COPD TYPE (HCC): ICD-10-CM

## 2022-10-14 DIAGNOSIS — F33.0 MAJOR DEPRESSIVE DISORDER, RECURRENT, MILD (HCC): ICD-10-CM

## 2022-10-14 LAB
ABSOLUTE EOS #: 0.23 K/UL (ref 0–0.44)
ABSOLUTE IMMATURE GRANULOCYTE: 0.03 K/UL (ref 0–0.3)
ABSOLUTE LYMPH #: 2.06 K/UL (ref 1.1–3.7)
ABSOLUTE MONO #: 0.39 K/UL (ref 0.1–1.2)
BASOPHILS # BLD: 0 % (ref 0–2)
BASOPHILS ABSOLUTE: <0.03 K/UL (ref 0–0.2)
EOSINOPHILS RELATIVE PERCENT: 3 % (ref 1–4)
HCT VFR BLD CALC: 46.4 % (ref 40.7–50.3)
HEMOGLOBIN: 15.5 G/DL (ref 13–17)
IMMATURE GRANULOCYTES: 0 %
LYMPHOCYTES # BLD: 23 % (ref 24–43)
MCH RBC QN AUTO: 35.1 PG (ref 25.2–33.5)
MCHC RBC AUTO-ENTMCNC: 33.4 G/DL (ref 28.4–34.8)
MCV RBC AUTO: 105 FL (ref 82.6–102.9)
MONOCYTES # BLD: 4 % (ref 3–12)
NRBC AUTOMATED: 0 PER 100 WBC
PDW BLD-RTO: 12.9 % (ref 11.8–14.4)
PLATELET # BLD: 207 K/UL (ref 138–453)
PMV BLD AUTO: 11.1 FL (ref 8.1–13.5)
RBC # BLD: 4.42 M/UL (ref 4.21–5.77)
RBC # BLD: ABNORMAL 10*6/UL
SEG NEUTROPHILS: 70 % (ref 36–65)
SEGMENTED NEUTROPHILS ABSOLUTE COUNT: 6.39 K/UL (ref 1.5–8.1)
WBC # BLD: 9.1 K/UL (ref 3.5–11.3)

## 2022-10-14 PROCEDURE — 71046 X-RAY EXAM CHEST 2 VIEWS: CPT

## 2022-10-14 PROCEDURE — 84443 ASSAY THYROID STIM HORMONE: CPT

## 2022-10-14 PROCEDURE — 83036 HEMOGLOBIN GLYCOSYLATED A1C: CPT

## 2022-10-14 PROCEDURE — G0103 PSA SCREENING: HCPCS

## 2022-10-14 PROCEDURE — 80053 COMPREHEN METABOLIC PANEL: CPT

## 2022-10-14 PROCEDURE — 80061 LIPID PANEL: CPT

## 2022-10-14 PROCEDURE — 85025 COMPLETE CBC W/AUTO DIFF WBC: CPT

## 2022-10-14 PROCEDURE — 84403 ASSAY OF TOTAL TESTOSTERONE: CPT

## 2022-10-14 PROCEDURE — 72050 X-RAY EXAM NECK SPINE 4/5VWS: CPT

## 2022-10-14 PROCEDURE — 36415 COLL VENOUS BLD VENIPUNCTURE: CPT

## 2022-10-15 LAB
ALBUMIN SERPL-MCNC: 4.8 G/DL (ref 3.5–5.2)
ALBUMIN/GLOBULIN RATIO: 1.6 (ref 1–2.5)
ALP BLD-CCNC: 76 U/L (ref 40–129)
ALT SERPL-CCNC: 12 U/L (ref 5–41)
ANION GAP SERPL CALCULATED.3IONS-SCNC: 12 MMOL/L (ref 9–17)
AST SERPL-CCNC: 19 U/L
BILIRUB SERPL-MCNC: 0.4 MG/DL (ref 0.3–1.2)
BUN BLDV-MCNC: 19 MG/DL (ref 8–23)
CALCIUM SERPL-MCNC: 9.3 MG/DL (ref 8.6–10.4)
CHLORIDE BLD-SCNC: 103 MMOL/L (ref 98–107)
CHOLESTEROL/HDL RATIO: 4.7
CHOLESTEROL: 208 MG/DL
CO2: 25 MMOL/L (ref 20–31)
CREAT SERPL-MCNC: 0.89 MG/DL (ref 0.7–1.2)
GFR SERPL CREATININE-BSD FRML MDRD: >60 ML/MIN/1.73M2
GLUCOSE BLD-MCNC: 96 MG/DL (ref 70–99)
HDLC SERPL-MCNC: 44 MG/DL
LDL CHOLESTEROL: 137 MG/DL (ref 0–130)
POTASSIUM SERPL-SCNC: 4.4 MMOL/L (ref 3.7–5.3)
PROSTATE SPECIFIC ANTIGEN: 0.15 NG/ML
SODIUM BLD-SCNC: 140 MMOL/L (ref 135–144)
TESTOSTERONE TOTAL: 376 NG/DL (ref 220–1000)
TOTAL PROTEIN: 7.8 G/DL (ref 6.4–8.3)
TRIGL SERPL-MCNC: 134 MG/DL
TSH SERPL DL<=0.05 MIU/L-ACNC: 1.61 UIU/ML (ref 0.3–5)

## 2022-10-16 LAB
ESTIMATED AVERAGE GLUCOSE: 114 MG/DL
HBA1C MFR BLD: 5.6 % (ref 4–6)

## 2022-10-17 ENCOUNTER — HOSPITAL ENCOUNTER (OUTPATIENT)
Dept: PHYSICAL THERAPY | Facility: CLINIC | Age: 67
Setting detail: THERAPIES SERIES
Discharge: HOME | End: 2022-10-17
Payer: MEDICARE

## 2022-10-17 NOTE — FLOWSHEET NOTE
[] [address]  [address]    [phone]  F: [phone]   [] [address] [facility] [address]   [address]  P: [phone]  F: [phone]  [] [address]  [address] [facility]  P: [phone]  F: [phone] [] [address]  P: [phone]  F: [phone]  [] [address]  [facility]   [address]   [place]: [phone]  F: [phone]   [] [address]  [place]: [phone]   F: [phone]     Physical Therapy Cancel/No Show note    Date: 10/17/2022  Patient: Quinton Gambino  : 1955  MRN: [id number]    Cancels/No Shows to date: NOT COUNTED AGAINST PATIENT. For today's appointment patient:    [x]  Cancelled BY DEPARTMENT. Provider out of office.     [] Rescheduled appointment    [] No-show     Reason given by patient:    []  Patient ill    []  Conflicting appointment    [] No transportation      [] Conflict with work    [] No reason given    [] Weather related    [] [id number]    [] Other:      Comments:        [x] Next appointment was confirmed 10/19/22 @ 12pm.    Electronically signed by: Hernesto Jiang

## 2022-10-18 ENCOUNTER — OFFICE VISIT (OUTPATIENT)
Dept: UROLOGY | Age: 67
End: 2022-10-18
Payer: MEDICARE

## 2022-10-18 VITALS
WEIGHT: 180 LBS | SYSTOLIC BLOOD PRESSURE: 142 MMHG | BODY MASS INDEX: 28.25 KG/M2 | HEIGHT: 67 IN | OXYGEN SATURATION: 98 % | TEMPERATURE: 96.8 F | DIASTOLIC BLOOD PRESSURE: 88 MMHG | HEART RATE: 60 BPM

## 2022-10-18 DIAGNOSIS — R97.21 RISING PSA FOLLOWING TREATMENT FOR MALIGNANT NEOPLASM OF PROSTATE: Primary | ICD-10-CM

## 2022-10-18 DIAGNOSIS — N52.31 ERECTILE DYSFUNCTION AFTER RADICAL PROSTATECTOMY: ICD-10-CM

## 2022-10-18 PROCEDURE — G8417 CALC BMI ABV UP PARAM F/U: HCPCS | Performed by: UROLOGY

## 2022-10-18 PROCEDURE — G8427 DOCREV CUR MEDS BY ELIG CLIN: HCPCS | Performed by: UROLOGY

## 2022-10-18 PROCEDURE — 99204 OFFICE O/P NEW MOD 45 MIN: CPT | Performed by: UROLOGY

## 2022-10-18 PROCEDURE — G8484 FLU IMMUNIZE NO ADMIN: HCPCS | Performed by: UROLOGY

## 2022-10-18 PROCEDURE — 1036F TOBACCO NON-USER: CPT | Performed by: UROLOGY

## 2022-10-18 PROCEDURE — 1123F ACP DISCUSS/DSCN MKR DOCD: CPT | Performed by: UROLOGY

## 2022-10-18 PROCEDURE — 3017F COLORECTAL CA SCREEN DOC REV: CPT | Performed by: UROLOGY

## 2022-10-18 ASSESSMENT — ENCOUNTER SYMPTOMS
COUGH: 0
WHEEZING: 1
SORE THROAT: 0
VOMITING: 0
NAUSEA: 0
SHORTNESS OF BREATH: 0
DIARRHEA: 0

## 2022-10-18 NOTE — PROGRESS NOTES
[address] [address]  Dept: [phone]  Dept Fax: [phone] aFrshad Gee [facility] Urology Office Note - New patient    Patient:  Catalina Patten  YOB: 1955  Date: 10/18/2022    The patient is a 79 y.o. male who presents todayfor evaluation of the following problems:   Chief Complaint   Patient presents with    New Patient    Prostate Cancer    referred by Gloria Leslie DO.      HPI  He is here for prostate cancer. He had a RALP 5 years ago. He is voiding fine. He has had some penile shrinkage. He cannot obtain any erections. His PSA is 0.15. He does not wear any pads. He tried the pump, which did not work. Viagra does not work. (Patient's old records have been requested, reviewed and summarized in today's note.)    Summary of old records: N/A    Additional History: N/A    Procedures Today: N/A    Last several PSA's:  Lab Results   Component Value Date    PSA 0.15 10/14/2022    PSA 0.03 11/19/2019    PSA 0.01 02/14/2018     Last total testosterone:  Lab Results   Component Value Date    TESTOSTERONE 376 10/14/2022     Urinalysis today:  No results found for this visit on 10/18/22. AUA Symptom Score (10/18/2022): Last BUN and creatinine:  Lab Results   Component Value Date    BUN 19 10/14/2022     Lab Results   Component Value Date    CREATININE 0.89 10/14/2022       Additional Lab/Culture results: none    Imaging Reviewed during this Office Visit: none  (results were independently reviewed by physician and radiology report verified)    PAST MEDICAL, FAMILY AND SOCIAL HISTORY:  Past Medical History:   Diagnosis Date    Allergic rhinitis     Asthma     On Inhaler    Depression     Dyslipidemia     Erectile dysfunction     Hepatitis C     Hx of rotator cuff surgery     Rt.      Hypertension     No Meds     Past Surgical History:   Procedure Laterality Date    COLONOSCOPY      COLONOSCOPY  02/20/2019    polypectomy    COLONOSCOPY N/A 2/20/2019    COLONOSCOPY POLYPECTOMY SNARE/COLD BIOPSY performed by Meenakshi Wong MD at [address] [place] [address]  01/26/2017    robotic radical prostatectomy with pelvic node dissection    ROTATOR CUFF REPAIR Right 2001 ?      Family History   Problem Relation Age of Onset    Lung Cancer Mother     Heart Attack Father     Cancer Father         Throat     Outpatient Medications Marked as Taking for the 10/18/22 encounter (Office Visit) with Dru Gutierrez MD   Medication Sig Dispense Refill    tiZANidine (ZANAFLEX) 4 MG tablet Take 4 mg by mouth every 6 hours as needed      albuterol sulfate HFA (PROVENTIL;VENTOLIN;PROAIR) 108 (90 Base) MCG/ACT inhaler INHALE TWO PUFFS BY MOUTH EVERY 4 HOURS AS NEEDED FOR WHEEZING 18 g 5    escitalopram (LEXAPRO) 20 MG tablet TAKE ONE TABLET BY MOUTH DAILY 90 tablet 1    umeclidinium-vilanterol (ANORO ELLIPTA) 62.5-25 MCG/INH AEPB inhaler Inhale 1 puff into the lungs daily 28 each 0    Budeson-Glycopyrrol-Formoterol (BREZTRI AEROSPHERE) 160-9-4.8 MCG/ACT AERO Inhale 2 each into the lungs 2 times daily 2 Inhaler 0    losartan (COZAAR) 50 MG tablet Take 1 tablet by mouth daily 30 tablet 2    atorvastatin (LIPITOR) 10 MG tablet Take 1 tablet by mouth daily 30 tablet 2        Pcn [penicillins]  Social History     Tobacco Use   Smoking Status Former    Packs/day: 1.00    Years: 40.00    Pack years: 40.00    Types: Cigarettes    Start date: 1/12/1971    Quit date: 11/1/2018    Years since quitting: 3.9   Smokeless Tobacco Former   Tobacco Comments    Zyn nicotine containing pouches      (If patient a smoker, smoking cessation counseling offered)   Social History     Substance and Sexual Activity   Alcohol Use Yes    Alcohol/week: 7.0 standard drinks    Types: 7 Standard drinks or equivalent per week       REVIEW OF SYSTEMS:  Review of Systems    Physical Exam:    This a 79 y.o. male   Vitals: 10/18/22 0922   BP: (!) 142/88   Pulse: 60   Temp: 96.8 °F (36 °C)   SpO2: 98%     Body mass index is 28.19 kg/m². Physical Exam  Constitutional: Patient in no acute distress. Neuro: Alert and oriented to person, place and time. Psych: Mood normal, affect normal  Lungs:Respiratory effort is normal  Cardiovascular: Warm & Pink  Abdomen: Soft, non-tender, non-distendedwith no CVA,  No flank tenderness,  Orhepatosplenomegaly   Lymphatics: No palpable lymphadenopathy. Bladder non-tender and not distended. Musculoskeletal: Normal gait and station  Penis normal and circumcised  Urethral meatus normal  Scrotal exam normal  Testicles normal bilaterally  Epididymis normal bilaterally      Assessment and Plan      1. Rising PSA following treatment for malignant neoplasm of prostate    2. Erectile dysfunction after radical prostatectomy           Plan:       He is having ED after RALP 5 years ago. Would recommend trimix. We discussed it and he is interested. PSA has been going up slowly. Would recommend referral to Rad Onc. Prescriptions Ordered:  No orders of the defined types were placed in this encounter. Orders Placed:  No orders of the defined types were placed in this encounter. Nahid Bailey MD    Agree with the ROS entered by the MA.

## 2022-10-18 NOTE — LETTER
[address] [address]  Dept: [phone]  Dept Fax: [phone]        10/18/22    Patient: Warren Deng  YOB: 1955    Dear Yrn Pennington DO,    I had the pleasure of seeing one of your patients, Scott Barraza today in the office today. Below are the relevant portions of my assessment and plan of care. IMPRESSION:  1. Rising PSA following treatment for malignant neoplasm of prostate    2. Erectile dysfunction after radical prostatectomy        PLAN:  He is having ED after RALP 5 years ago. Would recommend trimix. We discussed it and he is interested. PSA has been going up slowly. Would recommend referral to Rad Onc. Prescriptions Ordered:  No orders of the defined types were placed in this encounter. Orders Placed:  No orders of the defined types were placed in this encounter. Thank you for allowing me to participate in the care of this patient. I will keep you updated on this patient's follow up and I look forward to serving you and your patients again in the future.         Luz Arora MD

## 2022-10-18 NOTE — PROGRESS NOTES
Review of Systems   Constitutional:  Negative for chills, fatigue and fever. HENT:  Negative for congestion and sore throat. Respiratory:  Positive for wheezing. Negative for cough and shortness of breath. Cardiovascular:  Negative for chest pain and palpitations. Gastrointestinal:  Negative for diarrhea, nausea and vomiting. Genitourinary:  Negative for difficulty urinating, dysuria, frequency, hematuria and urgency.

## 2022-10-19 ENCOUNTER — HOSPITAL ENCOUNTER (OUTPATIENT)
Dept: PHYSICAL THERAPY | Facility: CLINIC | Age: 67
Setting detail: THERAPIES SERIES
Discharge: HOME | End: 2022-10-19
Payer: MEDICARE

## 2022-10-19 DIAGNOSIS — R97.21 RISING PSA FOLLOWING TREATMENT FOR MALIGNANT NEOPLASM OF PROSTATE: Primary | ICD-10-CM

## 2022-10-19 PROCEDURE — 97140 MANUAL THERAPY 1/> REGIONS: CPT

## 2022-10-19 PROCEDURE — 97110 THERAPEUTIC EXERCISES: CPT

## 2022-10-19 NOTE — FLOWSHEET NOTE
[] [facility]  [address]  [phone]  F: [phone] [x] [address]  [facility]   [address]  P: [phone]  F: [phone] [] [address]  [address]  P: [phone]  F: [phone] [] [address]  P: [phone]  F: [phone] [] [address]  [facility]   [address]   [place]: [phone]  F: [phone]      Physical Therapy Daily Treatment Note    Date:  10/19/2022  Patient Name:  Sirena Bhagat    :  1955  MRN: [id number]  Physician: Maryann Priest DO                                Insurance: medicare/BC/  Medical Diagnosis: cervical radiculopathy                 Rehab Codes: M54.13; M25.60; M79.601; R29.3; M62.521; R20.2  Onset Date: 22                 Next 's appt. : 23  Visit# / total visits: ; Progress note for Medicare patient due at visit 10     Cancels/No Shows: 0/0    Subjective:    Pain:  [x] Yes  [] No Location: right upper trap  N/A Pain Rating: (0-10 scale) 1/10  Pain altered Tx:  [x] No  [] Yes  Action:  Comments: Pt arrives 15 minutes late but able to accommodate. Pt has not yet gotten cervical roll.      Objective:  Modalities:   Precautions:  Exercises:  Access Code: [email or web address]    Exercise Reps/ Time Weight/ Level Comments   Seated      Retraction cervical 10   Unable to do supine: sitting with verbal/tactile cues   UT stretch ea 5 A     Scapular retraction 10 A     Sleeping posture x       Posterior shoulder rolls 10x  Added 10/19   Extension thoracic stretch 10x  Added 10/19   Foam roller ext stretch  10x10 Foam roller, manual Extension thoracic stretch 10x  Foam roller       Prone      Prone scap retraction 10x5\"  Added 10/19   Prone ext 10x5\" Added 10/19         Supine      Supine pec stretch 30\"x4  Added 10/19   Standing       Doorway stretch 30\"x2 ea  Added 10/19  90° and 60°                         Manual 15'  MFR to upper trap, levator and scalenes    Other: therapeutic activities: use of cervical roll. Pt finds this comfortable     Specific Instructions for next treatment: manual cervical, R UT, scapular releases, pec release. Stretch anterior chest,     Treatment Charges: Mins Units   []  Modalities     [x]  Ther Exercise 28 2   [x]  Manual Therapy 15 1   []  Ther Activities     []  Aquatics     []  Vasocompression     []  Other     Total Treatment time 43 3   MEDICARE TRACKING: as of 10/19/22: $194.91         Assessment: [x] Progressing toward goals. Pt notes that his pain is worse in the morning but then when he does the exercises and stretches his pain seems to decrease. Cues to relax throughout, logan upper trap musculature. Progressed HEP and thourghly reviewed posture this date. [] No change. [] Other:  [x] Patient would continue to benefit from skilled physical therapy services in order to: improve postural awareness, teach proper body mechanics, receive manual therapy for the reduction of symptoms in the R scapula and UE and improved tone of the upper quadrant mm and tolerate flexibility exercises to address the tightness present. STG: (to be met in 6 treatments)  ? Pain: below 4/10 max with infrequent R UE symptoms  ? ROM: tolerate flexibility ex and complete them without outside assist  ? Strength: tolerate postural exercises to address weakness  ? Function: sleep restored to baseline  Patient to be independent with home exercise program as demonstrated by performance with correct form without cues. LTG: (to be met in 12  treatments)  Nearly eliminate all UE symptoms.  Have pain in R scapula/cervical region be no more than 2/10  Pain below 3/10 max with minimal to 0 UE symptoms  Assume proper posture and body mechanics correctly and independently  Normalize biceps strength on R to indicate improved mm/nn function  Decreased amount of chronic increase in tone mm as above mentioned        Patient goals: \"to fix it\"         Pt. Education:  [x] Yes  [] No  [x] Reviewed Prior HEP/Ed  Method of Education: [x] Verbal  [x] Demo  [x] Written    Access Code: [email or web address]  URL: [email or web address]  Date: 10/19/2022  Prepared by: Everardo Carney    Exercises  Neck Retraction - 3 x daily - 7 x weekly - 1 sets - 10 reps  Seated Scapular Retraction - 3 x daily - 7 x weekly - 1 sets - 10 reps  Seated Cervical Sidebending AROM - 1 x daily - 7 x weekly - 1 sets - 5 reps - 5 hold  Levator Scapulae Stretch Level 2 - 1 x daily - 7 x weekly - 2 sets - 5 reps - 10 hold  Doorway Pec Stretch at 90 Degrees Abduction - 1 x daily - 7 x weekly - 3 sets - 10 reps - 20 hold  Seated Thoracic Lumbar Extension with Pectoralis Stretch - 1 x daily - 7 x weekly - 3 sets - 10 reps  Prone Scapular Retraction - 1 x daily - 7 x weekly - 2 sets - 10 reps - 3 hold  Supine Thoracic Mobilization Towel Roll Vertical with Arm Stretch - 1 x daily - 7 x weekly - 3 sets - 10 reps - 10 hold    Patient Education  Sleep Positions    Comprehension of Education:  [x] Verbalizes understanding. [] Demonstrates understanding. [] Needs review. [x] Demonstrates/verbalizes HEP/Ed previously given. Plan: [x] Continue current frequency toward long and short term goals.     [x] Specific Instructions for subsequent treatments: Increase manual if needed and progress prone scap exercises       Time In: 12:15 pm            Time Out: 12:58 pm    Electronically signed by:  Beka Vega PTA

## 2022-10-24 ENCOUNTER — HOSPITAL ENCOUNTER (OUTPATIENT)
Dept: PHYSICAL THERAPY | Facility: CLINIC | Age: 67
Setting detail: THERAPIES SERIES
Discharge: HOME | End: 2022-10-24
Payer: MEDICARE

## 2022-10-24 PROCEDURE — 97140 MANUAL THERAPY 1/> REGIONS: CPT

## 2022-10-24 NOTE — FLOWSHEET NOTE
[] [facility]  [address]  [phone]  F: [phone] [x] [address]  [facility]   [address]  P: [phone]  F: [phone] [] [address]  [address]  P: [phone]  F: [phone] [] [address]  P: [phone]  F: [phone] [] [address]  [facility]   [address]   [place]: [phone]  F: [phone]      Physical Therapy Daily Treatment Note    Date:  10/24/2022  Patient Name:  Lauren Brewer    :  1955  MRN: [id number]  Physician: Everardo De Oliveira DO                                Insurance: medicare/BC/  Medical Diagnosis: cervical radiculopathy                 Rehab Codes: M54.13; M25.60; M79.601; R29.3; M62.521; R20.2  Onset Date: 22                 Next 's appt. : 23  Visit# / total visits: 3/12; Progress note for Medicare patient due at visit 10     Cancels/No Shows: 0/0    Subjective:    Pain:  [x] Yes  [] No Location: right upper trap  N/A Pain Rating: (0-10 scale) 1/10  Pain altered Tx:  [x] No  [] Yes  Action:  Comments: pt with mild LBP from lifting things yesterday. Neck has been better. If he tries to lie on his back his L arm will start tingling.  His L side is now more affected than his R.     Objective:  Modalities:   Precautions:  Exercises:  Access Code: [email or web address]  Bolded 10/24/22   Exercise Reps/ Time Weight/ Level Comments   Seated      Retraction cervical 10   Unable to do supine: sitting with verbal/tactile cues   UT stretch ea 5 A     Scapular retraction 10 A     Sleeping posture x       Posterior shoulder rolls 10x  Added 10/19   Extension thoracic stretch 10x  Added 10/19   Foam roller ext stretch  10x10 Foam roller, manual Extension thoracic stretch 10x  Foam roller       Prone      Prone scap retraction 10x5\"  Added 10/19   Prone ext 10x5\"  Added 10/19         Supine      Supine pec stretch 30\"x4  Added 10/19   Standing       Doorway stretch 30\"x2 ea  Added 10/19  90° and 60°                         Manual 45'  10/24/22: see below description  Other: therapeutic activities: use of cervical roll. Pt finds this comfortable     MANUAL: occipital base release. Upper trapezius from sternocleidomastoid muscle release bilaterally; brachial plexus stretch with UE movement bilaterally; superficial cervical fascia release from posterior to anterior. Interclavicular ligament mobilization. B clavipectoral fascia release. Mm mm energy to Upper trapezius bilaterally    Specific Instructions for next treatment: manual cervical, R UT, scapular releases, pec release. Stretch anterior chest,     Treatment Charges: Mins Units   []  Modalities     []  Ther Exercise     [x]  Manual Therapy 45 3   []  Ther Activities     []  Aquatics     []  Vasocompression     []  Other     Total Treatment time 45 3   MEDICARE TRACKING: as of 10/24/22: $262.84         Assessment: [x] Progressing toward goals. The patient with fascial tightness more so on L than R of entire upper quadrant. Pt with tightness with brachial plexus stretching. Pt with improved tissue extensibility after treatment and no n/t that was present when he laid down.      [] No change. [] Other:  [x] Patient would continue to benefit from skilled physical therapy services in order to: improve postural awareness, teach proper body mechanics, receive manual therapy for the reduction of symptoms in the R scapula and UE and improved tone of the upper quadrant mm and tolerate flexibility exercises to address the tightness present. STG: (to be met in 6 treatments)  ? Pain: below 4/10 max with infrequent R UE symptoms  ?  ROM: tolerate flexibility ex and complete them without outside assist  ? Strength: tolerate postural exercises to address weakness  ? Function: sleep restored to baseline  Patient to be independent with home exercise program as demonstrated by performance with correct form without cues. LTG: (to be met in 12  treatments)  Nearly eliminate all UE symptoms. Have pain in R scapula/cervical region be no more than 2/10  Pain below 3/10 max with minimal to 0 UE symptoms  Assume proper posture and body mechanics correctly and independently  Normalize biceps strength on R to indicate improved mm/nn function  Decreased amount of chronic increase in tone mm as above mentioned        Patient goals: \"to fix it\"         Pt. Education:  [] Yes  [x] No  [] Reviewed Prior HEP/Ed  Method of Education: [] Verbal  [] Demo  [] Written    Access Code: [email or web address]  URL: [email or web address]  Date: 10/19/2022  Prepared by: Dawood Pride    Exercises  Neck Retraction - 3 x daily - 7 x weekly - 1 sets - 10 reps  Seated Scapular Retraction - 3 x daily - 7 x weekly - 1 sets - 10 reps  Seated Cervical Sidebending AROM - 1 x daily - 7 x weekly - 1 sets - 5 reps - 5 hold  Levator Scapulae Stretch Level 2 - 1 x daily - 7 x weekly - 2 sets - 5 reps - 10 hold  Doorway Pec Stretch at 90 Degrees Abduction - 1 x daily - 7 x weekly - 3 sets - 10 reps - 20 hold  Seated Thoracic Lumbar Extension with Pectoralis Stretch - 1 x daily - 7 x weekly - 3 sets - 10 reps  Prone Scapular Retraction - 1 x daily - 7 x weekly - 2 sets - 10 reps - 3 hold  Supine Thoracic Mobilization Towel Roll Vertical with Arm Stretch - 1 x daily - 7 x weekly - 3 sets - 10 reps - 10 hold    Patient Education  Sleep Positions    Comprehension of Education:  [] Verbalizes understanding. [] Demonstrates understanding. [] Needs review. [] Demonstrates/verbalizes HEP/Ed previously given. Plan: [x] Continue current frequency toward long and short term goals.     [x] Specific Instructions for subsequent treatments: Increase manual if needed and progress prone scap exercises       Time In: 12:03 pm            Time Out: 1:00 pm    Electronically signed by:  Pb Townsend PT

## 2022-11-02 ENCOUNTER — HOSPITAL ENCOUNTER (OUTPATIENT)
Dept: PHYSICAL THERAPY | Facility: CLINIC | Age: 67
Setting detail: THERAPIES SERIES
Discharge: HOME | End: 2022-11-02
Payer: MEDICARE

## 2022-11-02 PROCEDURE — 97140 MANUAL THERAPY 1/> REGIONS: CPT

## 2022-11-02 PROCEDURE — 97110 THERAPEUTIC EXERCISES: CPT

## 2022-11-02 NOTE — FLOWSHEET NOTE
[] [facility]  [address]  [phone]  F: [phone] [x] [address]  [facility]   [address]  P: [phone]  F: [phone] [] [address]  [address]  P: [phone]  F: [phone] [] [address]  P: [phone]  F: [phone] [] [address]  [facility]   [address]   [place]: [phone]  F: [phone]      Physical Therapy Daily Treatment Note    Date:  2022  Patient Name:  Lefty Caldwell    :  1955  MRN: [id number]  Physician: Triny Branham DO                                Insurance: medicare/BC/  Medical Diagnosis: cervical radiculopathy                 Rehab Codes: M54.13; M25.60; M79.601; R29.3; M62.521; R20.2  Onset Date: 22                 Next 's appt. : 23  Visit# / total visits: ; Progress note for Medicare patient due at visit 10     Cancels/No Shows: 0/1    Subjective:    Pain:  [x] Yes  [] No Location: right upper trap  N/A Pain Rating: (0-10 scale) 0/10  Pain altered Tx:  [x] No  [] Yes  Action:  Comments: Pt reports no pain today and that he is feeling good. Pt does report he has not yet done exercises yet this week.      Objective:  Modalities:   Precautions:  Exercises:  Access Code: [email or web address]  Bolded 22   Exercise Reps/ Time Weight/ Level Comments   Seated      Retraction cervical 10   Unable to do supine: sitting with verbal/tactile cues   UT stretch ea 5 A     Scapular retraction 10 A     Sleeping posture x       Posterior shoulder rolls 10x  Added 10/19   Extension thoracic stretch 10x  Added 10/19   Foam roller ext stretch  10x10 Foam roller, manual Extension thoracic stretch 10x  Foam roller       Prone      Prone scap retraction 10x5\"  Added 10/19   Prone ext 10x5\"  Added 10/19   Prone Ts 5x3\"  Added 11/2          Supine      Supine pec stretch 30\"x4  Added 10/19   Cervical retraction 10x5\"  Added 11/2 over towel roll    Protraction 10x  Added 11/2          Standing       Doorway stretch 30\"x2 ea  Added 10/19  90° and 60°   Standing protraction Unable properly  Attempted 11/2   Rows 10x2 blue Added 11/2 slow and controlled          Manual 15' see below     Other:      MANUAL: occipital base release. Upper trapezius from sternocleidomastoid muscle release bilaterally; bilateral pec release     Specific Instructions for next treatment: manual cervical, R UT, scapular releases, pec release. Stretch anterior chest,     Treatment Charges: Mins Units   []  Modalities     [x]  Ther Exercise 34 2   [x]  Manual Therapy 15 1   []  Ther Activities     []  Aquatics     []  Vasocompression     []  Other     Total Treatment time 49 3   MEDICARE TRACKING: as of 11/02/22: $325.90         Assessment: [x] Progressing toward goals. Less upper trap compensation noted with exercises. Attempted to add protraction push up this date with great difficulty. Patient remains very tight in pectorals therefore a pectoral release was added bilaterally today. [] No change. [] Other:  [x] Patient would continue to benefit from skilled physical therapy services in order to: improve postural awareness, teach proper body mechanics, receive manual therapy for the reduction of symptoms in the R scapula and UE and improved tone of the upper quadrant mm and tolerate flexibility exercises to address the tightness present. STG: (to be met in 6 treatments)  ? Pain: below 4/10 max with infrequent R UE symptoms  ? ROM: tolerate flexibility ex and complete them without outside assist  ? Strength: tolerate postural exercises to address weakness  ?  Function: sleep restored to baseline  Patient to be independent with home exercise program as demonstrated by performance with correct form without cues. LTG: (to be met in 12  treatments)  Nearly eliminate all UE symptoms. Have pain in R scapula/cervical region be no more than 2/10  Pain below 3/10 max with minimal to 0 UE symptoms  Assume proper posture and body mechanics correctly and independently  Normalize biceps strength on R to indicate improved mm/nn function  Decreased amount of chronic increase in tone mm as above mentioned        Patient goals: \"to fix it\"         Pt. Education:  [x] Yes  [] No  [x] Reviewed Prior HEP/Ed  Method of Education: [x] Verbal rows and band for HEP [x] Demo  [] Written    Access Code: [email or web address]  URL: [email or web address]  Date: 10/19/2022  Prepared by: Jay Small    Exercises  Neck Retraction - 3 x daily - 7 x weekly - 1 sets - 10 reps  Seated Scapular Retraction - 3 x daily - 7 x weekly - 1 sets - 10 reps  Seated Cervical Sidebending AROM - 1 x daily - 7 x weekly - 1 sets - 5 reps - 5 hold  Levator Scapulae Stretch Level 2 - 1 x daily - 7 x weekly - 2 sets - 5 reps - 10 hold  Doorway Pec Stretch at 90 Degrees Abduction - 1 x daily - 7 x weekly - 3 sets - 10 reps - 20 hold  Seated Thoracic Lumbar Extension with Pectoralis Stretch - 1 x daily - 7 x weekly - 3 sets - 10 reps  Prone Scapular Retraction - 1 x daily - 7 x weekly - 2 sets - 10 reps - 3 hold  Supine Thoracic Mobilization Towel Roll Vertical with Arm Stretch - 1 x daily - 7 x weekly - 3 sets - 10 reps - 10 hold    Patient Education  Sleep Positions    Comprehension of Education:  [] Verbalizes understanding. [] Demonstrates understanding. [x] Needs review. [] Demonstrates/verbalizes HEP/Ed previously given. Plan: [x] Continue current frequency toward long and short term goals.     [x] Specific Instructions for subsequent treatments:        Time In: 12:02 pm            Time Out: 12:51 pm    Electronically signed by:  Shaquille Rodríguez PTA

## 2022-11-03 ENCOUNTER — HOSPITAL ENCOUNTER (OUTPATIENT)
Dept: RADIATION ONCOLOGY | Age: 67
Discharge: HOME | End: 2022-11-03
Payer: MEDICARE

## 2022-11-03 VITALS
WEIGHT: 174.8 LBS | TEMPERATURE: 97.6 F | SYSTOLIC BLOOD PRESSURE: 176 MMHG | HEIGHT: 68 IN | DIASTOLIC BLOOD PRESSURE: 93 MMHG | RESPIRATION RATE: 16 BRPM | HEART RATE: 61 BPM | BODY MASS INDEX: 26.49 KG/M2 | OXYGEN SATURATION: 97 %

## 2022-11-03 DIAGNOSIS — C61 PROSTATE CANCER (HCC): Primary | ICD-10-CM

## 2022-11-03 PROCEDURE — 99214 OFFICE O/P EST MOD 30 MIN: CPT | Performed by: RADIOLOGY

## 2022-11-03 PROCEDURE — 99205 OFFICE O/P NEW HI 60 MIN: CPT | Performed by: RADIOLOGY

## 2022-11-03 NOTE — PROGRESS NOTES
Referring Physician: Gomez Dnig:    22 1320   BP: (!) 176/93   Pulse: 61   Resp: 16   Temp: 97.6 °F (36.4 °C)   SpO2: 97%    :     Pain Assessment: None - Denies Pain          Wt Readings from Last 1 Encounters:   22 174 lb 12.8 oz (79.3 kg)        Body mass index is 26.58 kg/m². Height: 5' 8\" (172.7 cm)         Immunizations:    Influenza status:    []   Current   [x]   Patient declined    Pneumococcal status:  []   Current  [x]   Patient declined  Covid status:   []  Dose #1:                     []  Dose #2:     []  Booster:               [x]  Patient declined    Smoking Status:    [] Smoker - PPD:   [x] Nonsmoker - Quit Date:               [] Never a smoker      No chief complaint on file. Cancer Staging  History of prostate cancer  Staging form: Prostate, AJCC 7th Edition  - Clinical stage from 2016: Stage Unknown (T1c, NX, M0, PSA: Less than 10, [place] 7) - Signed by Renetta Parker MD on 2/3/2017  - Pathologic stage from 2017: Stage III (T3a, N0, cM0, [place] 7) - Signed by Renetta Parker MD on 2/3/2017      Prior Radiation Therapy? No   If yes, site treated:   Facility:                             Date:    Concurrent Chemo/radiation? No   If yes, start date:    Prior Chemotherapy? No   If yes    Facility:                             Date:    Prior Hormonal Therapy or Contraceptive Medications? No   If yes,  Medication:                                Duration:    Head and Neck Cancer? No   If yes, please remind physician to place swallow study order and speech therapy order. Pacemaker/Defibulator/ICD:  No    Do you have any musculoskeletal diseases, Lupus or arthritic conditions? No   If yes, are you currently taking Methotrexate?   []  Yes  []  No           BREAST/GYN  Patient only:     LMP:    Age at first Menses:    Para:    :    Cup size:        PROSTATE patient only :    Oral hormone replacement therapy []  Yes  []  No            Current Outpatient Medications   Medication Sig Dispense Refill    tiZANidine (ZANAFLEX) 4 MG tablet Take 4 mg by mouth every 6 hours as needed      albuterol sulfate HFA (PROVENTIL;VENTOLIN;PROAIR) 108 (90 Base) MCG/ACT inhaler INHALE TWO PUFFS BY MOUTH EVERY 4 HOURS AS NEEDED FOR WHEEZING 18 g 5    escitalopram (LEXAPRO) 20 MG tablet TAKE ONE TABLET BY MOUTH DAILY 90 tablet 1    umeclidinium-vilanterol (ANORO ELLIPTA) 62.5-25 MCG/INH AEPB inhaler Inhale 1 puff into the lungs daily 28 each 0    Budeson-Glycopyrrol-Formoterol (BREZTRI AEROSPHERE) 160-9-4.8 MCG/ACT AERO Inhale 2 each into the lungs 2 times daily 2 Inhaler 0    losartan (COZAAR) 50 MG tablet Take 1 tablet by mouth daily 30 tablet 2    montelukast (SINGULAIR) 10 MG tablet Take 1 tablet by mouth daily (Patient not taking: No sig reported) 30 tablet 2    atorvastatin (LIPITOR) 10 MG tablet Take 1 tablet by mouth daily 30 tablet 2    mometasone (NASONEX) 50 MCG/ACT nasal spray 2 sprays by Nasal route daily (Patient not taking: No sig reported) 1 Inhaler 3     No current facility-administered medications for this encounter. Past Medical History:   Diagnosis Date    Allergic rhinitis     Asthma     On Inhaler    Depression     Dyslipidemia     Erectile dysfunction     Hepatitis C     Hx of rotator cuff surgery     Rt. Hypertension     No Meds       Past Surgical History:   Procedure Laterality Date    COLONOSCOPY      COLONOSCOPY  02/20/2019    polypectomy    COLONOSCOPY N/A 2/20/2019    COLONOSCOPY POLYPECTOMY SNARE/COLD BIOPSY performed by Miroslava Gregory MD at [place] [address]  01/26/2017    robotic radical prostatectomy with pelvic node dissection    ROTATOR CUFF REPAIR Right 2001 ?        Family History   Problem Relation Age of Onset    Lung Cancer Mother     Heart Attack Father     Cancer Father         Throat       Social History     Socioeconomic History    Marital status: Single     Spouse name: Not on file    Number of children: 2    Years of education: Not on file    Highest education level: Not on file   Occupational History    Occupation: Lab Tech     Employer: [employer]   Tobacco Use    Smoking status: Former     Packs/day: 1.00     Years: 40.00     Pack years: 40.00     Types: Cigarettes     Start date: 1971     Quit date: 2018     Years since quittin.0    Smokeless tobacco: Former    Tobacco comments:     ITALIAyn nicotine containing pouches   Substance and Sexual Activity    Alcohol use: Yes     Alcohol/week: 7.0 standard drinks     Types: 7 Standard drinks or equivalent per week    Drug use: No    Sexual activity: Yes   Other Topics Concern    Not on file   Social History Narrative    Not on file     Social Determinants of Health     Financial Resource Strain: Low Risk     Difficulty of Paying Living Expenses: Not hard at all   Food Insecurity: No Food Insecurity    Worried About Running Out of Food in the Last Year: Never true    Ran Out of Food in the Last Year: Never true   Transportation Needs: Not on file   Physical Activity: Not on file   Stress: Not on file   Social Connections: Not on file   Intimate Partner Violence: Not on file   Housing Stability: Not on file             FALLS RISK SCREEN  Instructions:  Assess the patient and enter the appropriate indicators that are present for fall risk identification. Total the numbers entered and assign a fall risk score from Table 2.  Reassess patient at a minimum every 12 weeks or with status change. Assessment   Date  11/3/2022     1. Mental Ability: confusion/cognitively impaired 0     2. Elimination Issues: incontinence, frequency 0       3. Ambulatory: use of assistive devices (walker, cane, off-loading devices),        attached to equipment (IV pole, oxygen) 0     4. Sensory Limitations: dizziness, vertigo, impaired vision 0     5. Age less than 65        0     6. Age 72 or greater 1     7.   Medication: diuretics, strong analgesics, hypnotics, sedatives, antihypertensive agents 3   8. Falls:  recent history of falls within the last 3 months (not to include slipping or        tripping) 0   TOTAL 4    If score of 4 or greater was education given? Yes       TABLE 2   Risk Score Risk Level Plan of Care   0-3 Little or  No Risk 1. Provide assistance as indicated for ambulation activities  2. Reorient confused/cognitively impaired patient  3. Call-light/bell within patient's reach  4. Chair/bed in low position, stretcher/bed with siderails up except when performing patient care activities  5. Educate patient/family/caregiver on falls prevention  6.  Reassess in 12 weeks or with any noted change in patient condition which places them at a risk for a fall   4-6 Moderate Risk 1. Provide assistance as indicated for ambulation activities  2. Reorient confused/cognitively impaired patient  3. Call-light/bell within patient's reach  4. Chair/bed in low position, stretcher/bed with siderails up except when performing patient care activities  5. Educate patient/family/caregiver on falls prevention     7 or   Higher High Risk 1. Place patient in easily observable treatment room  2. Patient attended at all times by family member or staff  3. Provide assistance as indicated for ambulation activities  4. Reorient confused/cognitively impaired patient  5. Call-light/bell within patient's reach  6. Chair/bed in low position, stretcher/bed with siderails up except when performing patient care activities  7. Educate patient/family/caregiver on falls prevention             Assessment/Plan: Patient was seen today for consultation. Pt has a history of Prostatectomy. Pt has rising PSA. Here to discuss RT. Dr. Ronald Salmeron updated and examined pt. Per MD pt will need a PET scan and return for follow up.           Ced Kaur RN 11/3/2022 1:25 PM

## 2022-11-06 NOTE — PROGRESS NOTES
[facility]            Radiation Oncology          [address] [facility]          [place], [facility] [address]        Mercedes Orellana: [phone]        F: [phone]       TranscribeMe             11/3/2022    Patient: Yary Seay   YOB: 1955       Dear Dr Hipolito Martinez: Thank you for referring Yary Seay to me for evaluation. Below are the relevant portions of my assessment and plan of care. If you have questions, please do not hesitate to call me. I look forward to following this patient along with you. Sincerely,    Electronically signed by Violet Adhikari MD on 22 at 10:11 PM EDT    CC: Patient Care Team:  Ulysses Hollow, DO as PCP - General (Family Medicine)  Ulysses Hollow, DO as PCP - [facility]  Emil Downs MD as Consulting Physician  Esther De La Rosa MD as Consulting Physician (Urology)  ------------------------------------------------------------------------------------------------------------------------------------------------------------------------------------------      RADIATION ONCOLOGY NEW PATIENT VISIT:    Date of Service: 11/3/2022    Location:  [address] [place] [address],   [address] [place] [address]., Johnny Mendez   [phone]     Patient ID:   Yary Seay  : 1955   MRN: [id number]    CHIEF COMPLAINT: \"Prostate Cancer\"    DIAGNOSIS:  Cancer Staging  History of prostate cancer  Staging form: Prostate, AJCC 7th Edition  - Clinical stage from 2016: Stage Unknown (T1c, NX, M0, PSA: Less than 10, [place] 7) - Signed by Rohith Cho MD on 2/3/2017  - Pathologic stage from 2017: Stage III (T3a, N0, cM0, [place] 7) - Signed by Rohith Cho MD on 2/3/2017    -s/p RP 2017  -PSA recurrence 0.15    HISTORY OF PRESENT ILLNESS:   Yary Seay is a 79 y.o. male with a history of an unfavorable risk prostate adenocarcinoma diagnosed in , and underwent a prostatectomy on 2017.  He unfortunately had adverse features at the time of extracapsular extension, but had a low PSA and therefore was monitored. His PSA unfortunately alon to 0.15 on 10/14/22 from 0.03 on 11/19/19. Given his rise, he has been referred to us today to discuss treatment options. He has not had any repeat imaging for staging. He denies any symptoms of headaches, dizziness, chest pain, SOB, abdominal pain, N/V/D, bony pain, swelling, bleeding, weight loss, or fatigue. He does have some urinary incontinence but is comfortable with wearing a pad. AUA Score: 2    PRIOR RADIATION HISTORY:  No prior history of radiation therapy    PACEMAKER: None    PAST MEDICAL HISTORY:  Past Medical History:   Diagnosis Date    Allergic rhinitis     Asthma     On Inhaler    Depression     Dyslipidemia     Erectile dysfunction     Hepatitis C     Hx of rotator cuff surgery     Rt. Hypertension     No Meds       PAST SURGICAL HISTORY:  Past Surgical History:   Procedure Laterality Date    COLONOSCOPY      COLONOSCOPY  02/20/2019    polypectomy    COLONOSCOPY N/A 2/20/2019    COLONOSCOPY POLYPECTOMY SNARE/COLD BIOPSY performed by Maxine Lou MD at [address] [place] [address]  01/26/2017    robotic radical prostatectomy with pelvic node dissection    ROTATOR CUFF REPAIR Right 2001 ?        MEDICATIONS:    Current Outpatient Medications:     tiZANidine (ZANAFLEX) 4 MG tablet, Take 4 mg by mouth every 6 hours as needed, Disp: , Rfl:     albuterol sulfate HFA (PROVENTIL;VENTOLIN;PROAIR) 108 (90 Base) MCG/ACT inhaler, INHALE TWO PUFFS BY MOUTH EVERY 4 HOURS AS NEEDED FOR WHEEZING, Disp: 18 g, Rfl: 5    escitalopram (LEXAPRO) 20 MG tablet, TAKE ONE TABLET BY MOUTH DAILY, Disp: 90 tablet, Rfl: 1    umeclidinium-vilanterol (ANORO ELLIPTA) 62.5-25 MCG/INH AEPB inhaler, Inhale 1 puff into the lungs daily, Disp: 28 each, Rfl: 0    Budeson-Glycopyrrol-Formoterol (BREZTRI AEROSPHERE) 160-9-4.8 MCG/ACT AERO, Inhale 2 each into the lungs 2 times daily, Disp: 2 Inhaler, Rfl: 0    losartan (COZAAR) 50 MG tablet, Take 1 tablet by mouth daily, Disp: 30 tablet, Rfl: 2    montelukast (SINGULAIR) 10 MG tablet, Take 1 tablet by mouth daily (Patient not taking: No sig reported), Disp: 30 tablet, Rfl: 2    atorvastatin (LIPITOR) 10 MG tablet, Take 1 tablet by mouth daily, Disp: 30 tablet, Rfl: 2    mometasone (NASONEX) 50 MCG/ACT nasal spray, 2 sprays by Nasal route daily (Patient not taking: No sig reported), Disp: 1 Inhaler, Rfl: 3    ALLERGIES:   Allergies   Allergen Reactions    Pcn [Penicillins] Swelling     Swelling of eyes       SOCIAL HISTORY:  Social History     Socioeconomic History    Marital status: Single     Spouse name: None    Number of children: 2    Years of education: None    Highest education level: None   Occupational History    Occupation: Lab Tech     Employer: [employer]   Tobacco Use    Smoking status: Former     Packs/day: 1.00     Years: 40.00     Pack years: 40.00     Types: Cigarettes     Start date: 1971     Quit date: 2018     Years since quittin.0    Smokeless tobacco: Former    Tobacco comments:     ITALIAyn nicotine containing pouches   Substance and Sexual Activity    Alcohol use: Yes     Alcohol/week: 7.0 standard drinks     Types: 7 Standard drinks or equivalent per week    Drug use: No    Sexual activity: Yes     Social Determinants of Health     Financial Resource Strain: Low Risk     Difficulty of Paying Living Expenses: Not hard at all   Food Insecurity: No Food Insecurity    Worried About Running Out of Food in the Last Year: Never true    Ran Out of Food in the Last Year: Never true       FAMILY HISTORY:  Family History   Problem Relation Age of Onset    Lung Cancer Mother     Heart Attack Father     Cancer Father         Throat       REVIEW OF SYSTEMS:    GENERAL/CONSTITUTIONAL: The patient denies fever, fatigue, weakness, weight gain or weight loss.   HEENT: The patient denies pain, redness, loss of vision, double or blurred vision. The patient denies ringing in the ears, loss of hearing, hoarseness, trouble swallowing, or painful swallowing. CARDIOVASCULAR: The patient denies chest pain, irregular heartbeats, sudden changes in heartbeat or palpitation. RESPIRATORY: The patient denies shortness of breath, cough, hemoptysis. GASTROINTESTINAL: The patient denies nausea, vomiting, diarrhea, constipation, blood in the stools. GENITOURINARY: (+)ED, incontinence. The patient denies difficult urination, pain or burning with urination, blood in the urine. MUSCULOSKELETAL: The patient denies arm, buttock, thigh or calf cramps. No joint or muscle pain. SKIN: The patient denies easy bruising, skin redness, skin rash, hives. NEUROLOGIC: The patient denies headache, dizziness, fainting, muscle spasm, loss of consciousness. ENDOCRINE: The patient denies intolerance to hot or cold temperature, flushing. HEMATOLOGIC/LYMPHATIC: The patient denies anemia, bleeding tendency or clotting tendency. ALLERGIC/IMMUNOLOGIC: The patient denies rhinitis, asthma, skin sensitivity. PYSCHOLOGIC: The patient denies any depression, anxiety, or confusion. A full 14 point review of systems was performed and assessed and found to be negative except as noted above. PHYSICAL EXAMINATION:    CHAPERONE: Family/friend/companieon Present    ECO Asymptomatic    VITAL SIGNS: BP (!) 176/93   Pulse 61   Temp 97.6 °F (36.4 °C) (Oral)   Resp 16   Ht 5' 8\" (1.727 m)   Wt 174 lb 12.8 oz (79.3 kg)   SpO2 97%   BMI 26.58 kg/m²   GENERAL:  General appearance is that of a well-nourished, well-developed in no apparent distress. HEENT: Normocephalic, atraumatic, EOMI, moist mucosa, no erythema. NECK:  No adenopathy or a palpable thyroid mass, trachea is midline. LYMPHATICS: No cervical, supraclavicular, or axillary adenopathy. HEART:  Normal rate and regular rhythm, normal heart sounds. LUNGS:  Pulmonary effort normal. Normal breath sounds. ABDOMEN:  Soft, nontender, non distended, and no hepatosplenomegaly. EXTREMITIES:  No edema. No calf tenderness. MSK:  No spinal tenderness. Normal ROM. NEUROLOGICAL: Alert and oriented. Strength and sensation intact bilaterally. No focal deficits. PSYCH: Mood normal, behavior normal.  SKIN: No erythema, no desquamation. RECTAL: Deferred     LABS:  WBC   Date Value Ref Range Status   10/14/2022 9.1 3.5 - 11.3 k/uL Final     Segs Absolute   Date Value Ref Range Status   10/14/2022 6.39 1.50 - 8.10 k/uL Final     Hemoglobin   Date Value Ref Range Status   10/14/2022 15.5 13.0 - 17.0 g/dL Final     Platelets   Date Value Ref Range Status   10/14/2022 207 138 - 453 k/uL Final     No results found for: , CEA  No results found for:   PSA   Date Value Ref Range Status   10/14/2022 0.15 <4.1 ng/mL Final     Comment:     The Roche \"ECLIA\" assay is used. Results obtained with different assay methods cannot be   used interchangeably. 11/19/2019 0.03 <4.1 ug/L Final     Comment:     The Roche \"ECLIA\" assay is used. Results obtained with different assay methods cannot be   used interchangeably. 02/14/2018 0.01 <4.1 ug/L Final     Comment:     The Roche \"ECLIA\" assay is used. Results obtained with different assay methods   cannot be used interchangeably. Performed at [address] [phone]     08/11/2016 4.41 (H) <4.1 ug/L Final     Comment:     The Roche \"ECLIA\" assay is used. Results obtained with different assay methods   cannot be used interchangeably. Performed at [address] [phone]     07/22/2015 3.22 <4.1 ug/L Final     Comment:     The Roche \"ECLIA\" assay is used. Results obtained with different assay methods   cannot be used interchangeably.   Performed at [address] [phone]           IMAGING:   None    PATHOLOGY:  1/26/17 RP   -- Diagnosis --     1. PROSTATE, RADICAL PROSTATECTOMY:         ADENOCARCINOMA, BESS SCORE 4+3 = 7 (GRADE GROUP 3). EXTRAPROSTATIC EXTENSION IS PRESENT ON THE LEFT SIDE. MARGINS, FREE OF CARCINOMA. 2.  BILATERAL PELVIC LYMPH NODES, EXCISION:         NEGATIVE FOR METASTATIC CARCINOMA (0/4). ASSESSMENT AND PLAN:   Quinton Gambino is a 79 y.o. male with a Cancer Staging  History of prostate cancer  Staging form: Prostate, AJCC 7th Edition  - Clinical stage from 11/16/2016: Stage Unknown (T1c, NX, M0, PSA: Less than 10, [place] 7) - Signed by Cheri Bray MD on 2/3/2017  - Pathologic stage from 1/26/2017: Stage III (T3a, N0, cM0, [place] 7) - Signed by Cheri Bray MD on 2/3/2017  . We reviewed with the patient and family the testing that has been done so far, including imaging and pathology results. We also reviewed their staging based on the testing that as been done and the recommendations per the NCCN guidelines. We discussed the options of treatment, including surgery, androgen deprivation therapy, and radiation, and the rationale of why radiation therapy would be indicated for this diagnosis. We also discussed that they have the option to not pursue our recommended treatment as well. Given his rise in PSA, we will recommend restaging imaging with PSMA PET. He will be recommended to get another PSA as well to confirm his biochemical failure. We reviewed the logistics of radiation treatment planning, including a CT Simulation session, as well as daily treatments for approximately 8 weeks. With regards to radiation to the prostate bed, I discussed the possible short-term side effects of skin irritation (causing redness, dryness or peeling), tiredness, low blood counts (causing infection or bleeding), nausea/vomiting, diarrhea, rectal bleeding, pain with urination, urgency and increased frequency of urination and blood in the urine.   Possible long-term side effects discussed included hyperpigmentation of the skin, damage to the bowel or intestines (resulting in bleeding or obstruction that may require surgery), damage to the bladder (resulting in decreased capacity and bleeding that may require surgery), and erectile dysfunction. After ample time to review the patient's questions, patient will be recommended to come back in 1 month with a PSMA PET and PSA. Patient was in agreement with my recommendations. All questions were answered to their satisfaction. Patient was advised to contact us anytime should they have any questions or concerns. Electronically signed by Jairo Agarwal MD on 11/5/22 at 10:11 PM EDT      Drugs Prescribed:  New Prescriptions    No medications on file       Orders Placed:     Orders Placed This Encounter   Procedures    PET CT TUMOR IMAGE SKULL THIGH PSMA    PSA, Diagnostic         CC:  Patient Care Team:  Eduardo Raygoza DO as PCP - General (Family Medicine)  Eduardo Raygoza DO as PCP - [facility]  Alirio Overton MD as Consulting Physician  Kevin Salvador MD as Consulting Physician (Urology)         Total time spent on this case on the day of encounter is 60 minutes. This time includes combination of one or more of the following - review of necessary tests, review of pertinent medical records from the EMR, performing medically appropriate examination and evaluation, counseling and educating the patient/family/caregiver, ordering necessary medical tests, procedures etc., documenting the clinical information in the electronic medical record, care coordination, referring and communicating with other health care providers and interpretation of results independently.  This note is created with the assistance of a speech recognition program.  While intending to generate a document that actually reflects the content of the visit, the document can still have some errors including those of syntax and sound a like substitutions which may

## 2022-11-08 ENCOUNTER — HOSPITAL ENCOUNTER (OUTPATIENT)
Dept: NUCLEAR MEDICINE | Age: 67
Discharge: HOME | End: 2022-11-10
Payer: MEDICARE

## 2022-11-08 ENCOUNTER — HOSPITAL ENCOUNTER (OUTPATIENT)
Age: 67
Setting detail: SPECIMEN
Discharge: HOME | End: 2022-11-08
Payer: MEDICARE

## 2022-11-08 DIAGNOSIS — C61 PROSTATE CANCER (HCC): ICD-10-CM

## 2022-11-08 LAB — PROSTATE SPECIFIC ANTIGEN: 0.13 NG/ML

## 2022-11-08 PROCEDURE — 84153 ASSAY OF PSA TOTAL: CPT

## 2022-11-08 PROCEDURE — 36415 COLL VENOUS BLD VENIPUNCTURE: CPT

## 2022-11-08 PROCEDURE — 78815 PET IMAGE W/CT SKULL-THIGH: CPT

## 2022-11-08 PROCEDURE — 6360000004 HC RX CONTRAST MEDICATION: Performed by: RADIOLOGY

## 2022-11-08 PROCEDURE — 2580000003 HC RX 258: Performed by: RADIOLOGY

## 2022-11-08 PROCEDURE — A9595 HC RX CONTRAST MEDICATION: HCPCS | Performed by: RADIOLOGY

## 2022-11-08 RX ORDER — SODIUM CHLORIDE 0.9 % (FLUSH) 0.9 %
10 SYRINGE (ML) INJECTION ONCE
Status: COMPLETED | OUTPATIENT
Start: 2022-11-08 | End: 2022-11-08

## 2022-11-08 RX ADMIN — SODIUM CHLORIDE, PRESERVATIVE FREE 10 ML: 5 INJECTION INTRAVENOUS at 12:45

## 2022-11-08 RX ADMIN — PIFLUFOLASTAT F-18 9.6 MILLICURIE: 80 INJECTION INTRAVENOUS at 12:45

## 2022-11-09 ENCOUNTER — HOSPITAL ENCOUNTER (OUTPATIENT)
Dept: PHYSICAL THERAPY | Facility: CLINIC | Age: 67
Setting detail: THERAPIES SERIES
Discharge: HOME | End: 2022-11-09
Payer: MEDICARE

## 2022-11-09 PROCEDURE — 97530 THERAPEUTIC ACTIVITIES: CPT

## 2022-11-09 PROCEDURE — 97140 MANUAL THERAPY 1/> REGIONS: CPT

## 2022-11-09 NOTE — FLOWSHEET NOTE
[] [facility]  [address]  [phone]  F: [phone] [x] [address]  [facility]   [address]  P: [phone]  F: [phone] [] [address]  [address] [facility]  P: [phone]  F: [phone] [] [address] [facility]  P: [phone]  F: [phone] [] [address]  [facility]   [address]   [place]: [phone]  F: [phone]      Physical Therapy Daily Treatment Note    Date:  2022  Patient Name:  Beatrice Child    :  1955  MRN: [id number]  Physician: Eulalia Hollis DO                                Insurance: medicare/BC/  Medical Diagnosis: cervical radiculopathy                 Rehab Codes: M54.13; M25.60; M79.601; R29.3; M62.521; R20.2  Onset Date: 22                 Next 's appt. : 23  Visit# / total visits: ; Progress note for Medicare patient due at visit 10     Cancels/No Shows: 0/1    Subjective:    Pain:  [x] Yes  [] No Location: right upper trap  N/A Pain Rating: (0-10 scale) 0/10  Pain altered Tx:  [x] No  [] Yes  Action:  Comments: feeling well and no radicular symptoms R UE.      Objective: 22: biceps strength: R 5/5; L 5/5; NDI 22: 12% deficit compared to 36% deficit at start of thrapy  Modalities:   Precautions:  Exercises:  Access Code: [email or web address]  Bolded 22   Exercise Reps/ Time Weight/ Level Comments   Seated      Retraction cervical 10   Unable to do supine: sitting with verbal/tactile cues   UT stretch ea 5 A     Scapular retraction 10 A     Sleeping posture x       Posterior shoulder rolls 10x  Added 10/19   Extension thoracic stretch 10x  Added 10/19   Foam roller ext stretch  10x10 Foam roller, manual Extension thoracic stretch 10x  Foam roller       Prone      Prone scap retraction 10x5\"  Added 10/19   Prone ext 10x5\"  Added 10/19   Prone Ts 5x3\"  Added 11/2          Supine      Supine pec stretch 30\"x4  Added 10/19   Cervical retraction 10x5\"  Added 11/2 over towel roll    Protraction 10x  Added 11/2          Standing       Doorway stretch 30\"x2 ea  Added 10/19  90° and 60°   Standing protraction Unable properly  Attempted 11/2   Rows 10x2 blue Added 11/2 slow and controlled          Manual 40' see below     Other:      MANUAL: occipital base release. Upper trapezius from sternocleidomastoid muscle release bilaterally; L clavipectoral fascia release x4 with intermittent tingling during release into the L deltoid and upper arm. Rib releases at L 3,4,5 levels and a/p distraction. Therapeutic activities: instructed in proper posture when in supine due to difficulty tolerating this position. This is due to posture. Posture review today. Specific Instructions for next treatment: manual cervical, R UT, scapular releases, pec release. Stretch anterior chest,     Treatment Charges: Mins Units   []  Modalities     []  Ther Exercise     [x]  Manual Therapy 35 2   [x]  Ther Activities 10 1   []  Aquatics     []  Vasocompression     []  Other     Total Treatment time 45 3   MEDICARE TRACKING: as of 11/09/22: $396.06         Assessment: [x] Progressing toward goals. Pt with improving posture, however, still with significant forward head and shoulders. The patient has a much better understanding of posture, body mechanics and is more understanding of how to support body in bed so there is less chance of n/t into UE. Since he has minimal to 0 UE symptoms or neck pain, therapy will be on hold for 2 weeks. Should symptoms return in that time, he is to call and get back on schedule. Otherwise he will be discharged at that time. [] No change.      [] Other:  [x] Patient would continue to benefit from skilled physical therapy services in order to: improve postural awareness, teach proper body mechanics, receive manual therapy for the reduction of symptoms in the R scapula and UE and improved tone of the upper quadrant mm and tolerate flexibility exercises to address the tightness present. STG: (to be met in 6 treatments)  ? Pain: below 4/10 max with infrequent R UE symptoms: MET 11/9/22  ? ROM: tolerate flexibility ex and complete them without outside assist: MET 11/9/22  ? Strength: tolerate postural exercises to address weakness: MET 11/9/22  ? Function: sleep restored to baseline: MET 11/9/22  Patient to be independent with home exercise program as demonstrated by performance with correct form without cues.: MET 11/9/22     LTG: (to be met in 12  treatments)  Nearly eliminate all UE symptoms. Have pain in R scapula/cervical region be no more than 2/10: MET 11/9/22  Pain below 3/10 max with minimal to 0 UE symptoms: MET 11/9/22  Assume proper posture and body mechanics correctly and independently: ONGOING 11/9/22  Normalize biceps strength on R to indicate improved mm/nn function: MET 11/9/22  Decreased amount of chronic increase in tone mm as above mentioned: PARTIALLY MET 11/9/22        Patient goals: \"to fix it\"         Pt. Education:  [x] Yes  [] No  [x] Reviewed Prior HEP/Ed  Method of Education: [x] Verbal rows and band for HEP [x] Demo  [] Written    Access Code: [email or web address]  URL: [email or web address]  Date: 10/19/2022  Prepared by: Jason Roselia    Exercises  Neck Retraction - 3 x daily - 7 x weekly - 1 sets - 10 reps  Seated Scapular Retraction - 3 x daily - 7 x weekly - 1 sets - 10 reps  Seated Cervical Sidebending AROM - 1 x daily - 7 x weekly - 1 sets - 5 reps - 5 hold  Levator Scapulae Stretch Level 2 - 1 x daily - 7 x weekly - 2 sets - 5 reps - 10 hold  Doorway Pec Stretch at 90 Degrees Abduction - 1 x daily - 7 x weekly - 3 sets - 10 reps - 20 hold  Seated Thoracic Lumbar Extension with Pectoralis Stretch - 1 x daily - 7 x weekly - 3 sets - 10 reps  Prone Scapular Retraction - 1 x daily - 7 x weekly - 2 sets - 10 reps - 3 hold  Supine Thoracic Mobilization Towel Roll Vertical with Arm Stretch - 1 x daily - 7 x weekly - 3 sets - 10 reps - 10 hold    Patient Education  Sleep Positions    Comprehension of Education:  [] Verbalizes understanding. [] Demonstrates understanding. [x] Needs review. [] Demonstrates/verbalizes HEP/Ed previously given. Plan: [x] Continue current frequency toward long and short term goals.     [x] Specific Instructions for subsequent treatments:        Time In: 2:00 pm            Time Out: 2:55 pm    Electronically signed by:  Gene Montelongo PT

## 2022-11-15 ENCOUNTER — HOSPITAL ENCOUNTER (OUTPATIENT)
Dept: RADIATION ONCOLOGY | Age: 67
Discharge: HOME | End: 2022-11-15
Payer: MEDICARE

## 2022-11-15 VITALS
OXYGEN SATURATION: 95 % | HEART RATE: 66 BPM | BODY MASS INDEX: 26.43 KG/M2 | RESPIRATION RATE: 16 BRPM | TEMPERATURE: 98.4 F | WEIGHT: 173.8 LBS | SYSTOLIC BLOOD PRESSURE: 149 MMHG | DIASTOLIC BLOOD PRESSURE: 74 MMHG

## 2022-11-15 PROCEDURE — 99212 OFFICE O/P EST SF 10 MIN: CPT | Performed by: RADIOLOGY

## 2022-11-15 PROCEDURE — 99214 OFFICE O/P EST MOD 30 MIN: CPT | Performed by: RADIOLOGY

## 2022-11-15 NOTE — PROGRESS NOTES
Misbah Piña  11/15/2022  2:42 PM      Vitals:    11/15/22 1440   BP: (!) 149/74   Pulse: 66   Resp: 16   Temp: 98.4 °F (36.9 °C)   SpO2: 95%    :     Pain Assessment: None - Denies Pain          Wt Readings from Last 1 Encounters:   11/15/22 173 lb 12.8 oz (78.8 kg)                Current Outpatient Medications:     tiZANidine (ZANAFLEX) 4 MG tablet, Take 4 mg by mouth every 6 hours as needed, Disp: , Rfl:     albuterol sulfate HFA (PROVENTIL;VENTOLIN;PROAIR) 108 (90 Base) MCG/ACT inhaler, INHALE TWO PUFFS BY MOUTH EVERY 4 HOURS AS NEEDED FOR WHEEZING, Disp: 18 g, Rfl: 5    escitalopram (LEXAPRO) 20 MG tablet, TAKE ONE TABLET BY MOUTH DAILY, Disp: 90 tablet, Rfl: 1    umeclidinium-vilanterol (ANORO ELLIPTA) 62.5-25 MCG/INH AEPB inhaler, Inhale 1 puff into the lungs daily, Disp: 28 each, Rfl: 0    Budeson-Glycopyrrol-Formoterol (BREZTRI AEROSPHERE) 160-9-4.8 MCG/ACT AERO, Inhale 2 each into the lungs 2 times daily, Disp: 2 Inhaler, Rfl: 0    losartan (COZAAR) 50 MG tablet, Take 1 tablet by mouth daily, Disp: 30 tablet, Rfl: 2    montelukast (SINGULAIR) 10 MG tablet, Take 1 tablet by mouth daily (Patient not taking: No sig reported), Disp: 30 tablet, Rfl: 2    atorvastatin (LIPITOR) 10 MG tablet, Take 1 tablet by mouth daily, Disp: 30 tablet, Rfl: 2    mometasone (NASONEX) 50 MCG/ACT nasal spray, 2 sprays by Nasal route daily (Patient not taking: No sig reported), Disp: 1 Inhaler, Rfl: 3               FALLS RISK SCREEN  Instructions:  Assess the patient and enter the appropriate indicators that are present for fall risk identification. Total the numbers entered and assign a fall risk score from Table 2.  Reassess patient at a minimum every 12 weeks or with status change. Assessment   Date  11/15/2022     1. Mental Ability: confusion/cognitively impaired 0     2. Elimination Issues: incontinence, frequency 0       3.   Ambulatory: use of assistive devices (walker, cane, off-loading devices),        attached to equipment (IV pole, oxygen) 0     4. Sensory Limitations: dizziness, vertigo, impaired vision 0     5. Age less than 65        0     6. Age 72 or greater 1     7. Medication: diuretics, strong analgesics, hypnotics, sedatives,        antihypertensive agents 3   8. Falls:  recent history of falls within the last 3 months (not to include slipping or        tripping) 0   TOTAL 4    If score of 4 or greater was education given? No           TABLE 2   Risk Score Risk Level Plan of Care   0-3 Little or  No Risk 1. Provide assistance as indicated for ambulation activities  2. Reorient confused/cognitively impaired patient  3. Chair/bed in low position, stretcher/bed with siderails up except when performing patient care activities  5. Educate patient/family/caregiver on falls prevention  6.  Reassess in 12 weeks or with any noted change in patient condition which places them at a risk for a fall   4-6 Moderate Risk 1. Provide assistance as indicated for ambulation activities  2. Reorient confused/cognitively impaired patient  3. Chair/bed in low position, stretcher/bed with siderails up except when performing patient care activities  4. Educate patient/family/caregiver on falls prevention     7 or   Higher High Risk 1. Place patient in easily observable treatment room  2. Patient attended at all times by family member or staff  3. Provide assistance as indicated for ambulation activities  4. Reorient confused/cognitively impaired patient  5. Chair/bed in low position, stretcher/bed with siderails up except when performing patient care activities  6. Educate patient/family/caregiver on falls prevention         PLAN: Patient is seen today in follow up. No major issues or concerns today. Dr. Noland Pel updated and reviewed pt's scan. Per MD pt will return on 11/29 for teach and sim. Pt signed informed consent and was given written and verbal instructions on bowel prep prior to sim.   Pt verbalized understanding and was given a copy of instructions.           Ced Kaur RN

## 2022-11-16 ENCOUNTER — HOSPITAL ENCOUNTER (OUTPATIENT)
Dept: PHYSICAL THERAPY | Facility: CLINIC | Age: 67
Setting detail: THERAPIES SERIES
End: 2022-11-16
Payer: MEDICARE

## 2022-11-17 NOTE — PROGRESS NOTES
[facility]            Radiation Oncology          [address] [facility]           [address]        Felecia De La Garza: [phone]        F: [phone]       [email or web address]           Date of Service: 11/15/2022     Location:  [facility] [address] [place],   [address] [facility]  [address], [place]   [phone]       RADIATION ONCOLOGY FOLLOW UP NOTE    Patient ID:   Shabbir Sheldon  : 1955   MRN: [id number]    DIAGNOSIS:  Cancer Staging  History of prostate cancer  Staging form: Prostate, AJCC 7th Edition  - Clinical stage from 2016: Stage Unknown (T1c, NX, M0, PSA: Less than 10, [place] 7) - Signed by Jennifer Mejia MD on 2/3/2017  - Pathologic stage from 2017: Stage III (T3a, N0, cM0, [place] 7) - Signed by Jennifer Mejia MD on 2/3/2017       -s/p RP 2017  -PSA recurrence 0.15      INTERVAL HISTORY:   Shabbir Sheldon is a 79 y.o.. 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 He seen in consultation and was referred for a PSMA PET scan to rule out metastases. He had his PET on 22 that showed no evidence of disease. Patient comes in today feeling well. He denies any headaches, chest pain, SOB, abdominal pain, N/V/D, bony pain, swelling, bleeding, weight loss, or fatigue. He denies any urinary symptoms but continues to have ED which is concerning. He would like to get treatment done sooner rather than later.         AUA Score: 2    MEDICATIONS:    Current Outpatient Medications:     tiZANidine (ZANAFLEX) 4 MG tablet, Take 4 mg by mouth every 6 hours as needed, Disp: , Rfl:     albuterol sulfate HFA (PROVENTIL;VENTOLIN;PROAIR) 108 (90 Base) MCG/ACT inhaler, INHALE TWO PUFFS BY MOUTH EVERY 4 HOURS AS NEEDED FOR WHEEZING, Disp: 18 g, Rfl: 5    escitalopram (LEXAPRO) 20 MG tablet, TAKE ONE TABLET BY MOUTH DAILY, Disp: 90 tablet, Rfl: 1 umeclidinium-vilanterol (ANORO ELLIPTA) 62.5-25 MCG/INH AEPB inhaler, Inhale 1 puff into the lungs daily, Disp: 28 each, Rfl: 0    Budeson-Glycopyrrol-Formoterol (BREZTRI AEROSPHERE) 160-9-4.8 MCG/ACT AERO, Inhale 2 each into the lungs 2 times daily, Disp: 2 Inhaler, Rfl: 0    losartan (COZAAR) 50 MG tablet, Take 1 tablet by mouth daily, Disp: 30 tablet, Rfl: 2    montelukast (SINGULAIR) 10 MG tablet, Take 1 tablet by mouth daily (Patient not taking: No sig reported), Disp: 30 tablet, Rfl: 2    atorvastatin (LIPITOR) 10 MG tablet, Take 1 tablet by mouth daily, Disp: 30 tablet, Rfl: 2    mometasone (NASONEX) 50 MCG/ACT nasal spray, 2 sprays by Nasal route daily (Patient not taking: No sig reported), Disp: 1 Inhaler, Rfl: 3    ALLERGIES:  Allergies   Allergen Reactions    Pcn [Penicillins] Swelling     Swelling of eyes         REVIEW OF SYSTEMS:    A full 14 point review of systems was performed and assessed and found to be negative except as noted above. PHYSICAL EXAMINATION:    CHAPERONE: Not Required    ECO Asymptomatic    VITAL SIGNS: BP (!) 149/74   Pulse 66   Temp 98.4 °F (36.9 °C) (Oral)   Resp 16   Wt 173 lb 12.8 oz (78.8 kg)   SpO2 95%   BMI 26.43 kg/m²   GENERAL:  General appearance is that of a well-nourished, well-developed in no apparent distress. HEENT: Normocephalic, atraumatic, EOMI, moist mucosa, no erythema. NECK:  No adenopathy or a palpable thyroid mass, trachea is midline. LYMPHATICS: No cervical, supraclavicular, or axillary adenopathy. HEART:  Normal rate and regular rhythm, normal heart sounds. LUNGS:  Pulmonary effort normal. Normal breath sounds. ABDOMEN:  Soft, nontender, non distended, and no hepatosplenomegaly. EXTREMITIES:  No edema. No calf tenderness. MSK:  No spinal tenderness. Normal ROM. NEUROLOGICAL: Alert and oriented. Strength and sensation intact bilaterally. No focal deficits.    PSYCH: Mood normal, behavior normal.  SKIN: No erythema, no desquamation. RECTAL: Deferred       LABS:  WBC   Date Value Ref Range Status   10/14/2022 9.1 3.5 - 11.3 k/uL Final     Segs Absolute   Date Value Ref Range Status   10/14/2022 6.39 1.50 - 8.10 k/uL Final     Hemoglobin   Date Value Ref Range Status   10/14/2022 15.5 13.0 - 17.0 g/dL Final     Platelets   Date Value Ref Range Status   10/14/2022 207 138 - 453 k/uL Final     No results found for: , CEA  PSA   Date Value Ref Range Status   2022 0.13 <4.1 ng/mL Final     Comment:     The Roche \"ECLIA\" assay is used. Results obtained with different assay methods cannot be   used interchangeably. 10/14/2022 0.15 <4.1 ng/mL Final     Comment:     The Roche \"ECLIA\" assay is used. Results obtained with different assay methods cannot be   used interchangeably. 2019 0.03 <4.1 ug/L Final     Comment:     The Roche \"ECLIA\" assay is used. Results obtained with different assay methods cannot be   used interchangeably. 2018 0.01 <4.1 ug/L Final     Comment:     The Roche \"ECLIA\" assay is used. Results obtained with different assay methods   cannot be used interchangeably. Performed at [address] [phone]     2016 4.41 (H) <4.1 ug/L Final     Comment:     The Roche \"ECLIA\" assay is used. Results obtained with different assay methods   cannot be used interchangeably. Performed at [address] [phone]         IMAGIN/8/22 PSMA PET    Impression   No PSMA avid recurrent or metastatic disease.          ASSESSMENT AND PLAN:  Markell Wallis is a 79 y.o. male with a Cancer Staging  History of prostate cancer  Staging form: Prostate, AJCC 7th Edition  - Clinical stage from 2016: Stage Unknown (T1c, NX, M0, PSA: Less than 10, [place] 7) - Signed by John Eagle MD on 2/3/2017  - Pathologic stage from 2017: Stage III (T3a, N0, cM0, [place] 7) - Signed by John Eagle MD on 2/3/2017  . We reviewed with the patient the testing that has been done so far, including imaging and pathology. We also reviewed their staging based on the testing that as been done and the recommendations per the NCCN guidelines. We discussed the options of treatment, including surgery, androgen deprivation therapy, and radiation, and the rationale of why radiation therapy would be indicated for this diagnosis. We also discussed that they have the option to not pursue our recommended treatment as well. Given his continued PSA >0.1, though his PSMA PET is negative, we do recommend salvage radiation to the prostate bed to get his disease back under control. Treatment will not change his ED, but he can meet with urology to review options. We reviewed the logistics of radiation treatment planning, including a CT Simulation session, as well as daily treatments for approximately 8 weeks. With regards to radiation to the prostate bed and pelvic LNs, I discussed the possible short-term side effects of skin irritation (causing redness, dryness or peeling), tiredness, low blood counts (causing infection or bleeding), nausea/vomiting, diarrhea, rectal bleeding, pain with urination, urgency and increased frequency of urination and blood in the urine. Possible long-term side effects discussed included hyperpigmentation of the skin, damage to the bowel or intestines (resulting in bleeding or obstruction that may require surgery), damage to the bladder (resulting in decreased capacity and bleeding that may require surgery), and erectile dysfunction. After ample time to review the patient's questions, an informed consent was obtained to proceed with scheduling a treatment planning session for radiation therapy. We will hold off on ADT as his PSA is low and he would like to avoid toxicities. Patient was in agreement with my recommendations. All questions were answered to their satisfaction.  Patient was advised to contact us anytime should they have any questions or concerns. Electronically signed by Tram Garcia MD on 11/16/2022 at 10:35 PM        Medications Prescribed:   New Prescriptions    No medications on file       Orders: No orders of the defined types were placed in this encounter. CC:  Patient Care Team:  Arline Maldonado DO as PCP - General (Family Medicine)  Arline Maldonado DO as PCP - [facility] Empaneled Provider  Juan Pablo Stacy MD as Consulting Physician  Shahnaz Avilez MD as Consulting Physician (Urology)     Total time spent on this case on the day of encounter is 30 minutes. This time includes combination of one or more of the following - review of necessary tests, review of pertinent medical records from the EMR, performing medically appropriate examination and evaluation, counseling and educating the patient/family/caregiver, ordering necessary medical tests, procedures etc., documenting the clinical information in the electronic medical record, care coordination, referring and communicating with other health care providers and interpretation of results independently. This note is created with the assistance of a speech recognition program.  While intending to generate a document that actually reflects the content of the visit, the document can still have some errors including those of syntax and sound a like substitutions which may escape proof reading. It such instances, actual meaning can be extrapolated by contextual diversion.

## 2022-11-24 DIAGNOSIS — J45.20 MILD INTERMITTENT ASTHMA WITHOUT COMPLICATION: ICD-10-CM

## 2022-11-25 NOTE — TELEPHONE ENCOUNTER
Hector Molina is calling to request a refill on the following medication(s):    Medication Request:  Requested Prescriptions     Pending Prescriptions Disp Refills    albuterol sulfate HFA (PROVENTIL;VENTOLIN;PROAIR) 108 (90 Base) MCG/ACT inhaler [Pharmacy Med Name: ALBUTEROL HFA 90 MCG INHALER] 8.5 g      Sig: inhale 2 puffs by mouth every 4 hours if needed for wheezing       Last Visit Date (If Applicable):  56/5/0892    Next Visit Date:    4/7/2023            .

## 2022-11-27 RX ORDER — ALBUTEROL SULFATE 90 UG/1
AEROSOL, METERED RESPIRATORY (INHALATION)
Qty: 8.5 G | Refills: 5 | Status: SHIPPED | OUTPATIENT
Start: 2022-11-27

## 2022-11-29 ENCOUNTER — HOSPITAL ENCOUNTER (OUTPATIENT)
Dept: RADIATION ONCOLOGY | Age: 67
Discharge: HOME | End: 2022-11-29
Payer: MEDICARE

## 2022-11-29 ENCOUNTER — TELEPHONE (OUTPATIENT)
Dept: ONCOLOGY | Age: 67
End: 2022-11-29

## 2022-11-29 PROCEDURE — 77334 RADIATION TREATMENT AID(S): CPT | Performed by: RADIOLOGY

## 2022-11-29 NOTE — DISCHARGE INSTRUCTIONS
Radiation Therapy Education    A Simulation Scan is like having a CT scan. Your physician will use the images obtained to develop your radiation treatment. May take 30 minutes to 1 hour to complete  Marking will be applied to your skin to help insure accurate positioning for your future treatments. A mold or a mask may also be needed to help aid in your positioning    Schedule: You will have treatments Monday - Friday  Treatments should take about 15 minutes from the time you enter the treatment room  You will have the same appointment time each day  You will see your doctor and nurse one day weekly while you are undergoing treatments. Every effort will be make to start your treatment at the scheduled time. However, there may be occasional delays due to emergency patients, technical problems, or other difficulties. Side Effects: Radiation is a local treatment so any side effects you may experience will be in your treatment area only. If you experience side effects they will typically occur after the 2nd or 3rd week of treatments. Many patients do not experience severe side effects and are able to continue working during their treatments. If you feel you treatments are interfering with your job, please notify your nurse        Skin Care During Radiation Treatments          Start applying moisturizers to the skin two times a day when you start your radiation        treatments  Suggested moisturizers include: Aquaphor, Eucerin, Exclair, Borion gel (may be purchased at [facility]) or Constanza Fuentes (to order call 6-794.143.5377 or go to www.Butlr)  Do not apply anything to your skin in your treatment area that is not recommended by your radiation nurse and/or doctor  Good general hygiene/bathing should be performed daily  Use moisturizing soaps that do not contain perfume or fragrances.  Recommended soaps include Dove or Dial  Use warm water, not hot water when bathing  After bathing, pat the skin dry rather than rubbing it, especially at the treatment site  Do not shave the treatment area. If you must shave, such as a beard, use an electric shaver. Don't use pre/after shave creams on treatment area. Avoid friction on and around your treatment area  Do not use tape, bandages, or medicated patches in the treatment area  Avoid tight fitting clothing  No massaging or scratching    Avoid extremes of temperature on the treatment area such as heating pads, ice packs, hot tubs, or saunas    If the area being treated is exposed to the sun, apply sunscreen routinely to the treatment site whenever you are outdoors. A sunscreen with a minimum of SPF30 should be used. Since the area being treated will always be more sensitive than the rest of your skin, continue to protect the area from sun exposure after your treatment ends    If your armpit is in the treatment area, do not use antiperspirants. An aluminum-free, non-metallic deodorant may be used. Tell your nurse or doctor if you have any of the following symptoms:  Blistered, open, swollen or tender areas of the skin in and around the treatment area  Pain or itching that is not helped by prescribed medications or recommended ointments      Questions and Answers about Radiation Therapy  1. What is radiation therapy? Radiation therapy (also called radiotherapy) is a cancer treatment that uses high  doses of radiation to kill cancer cells and shrink tumors. At low doses, radiation  is used as an x-ray to see inside your body and take pictures, such as x-rays of  your teeth or broken bones. 2.  How is radiation therapy given? Radiation therapy can be external beam or internal. External beam involves a  machine outside your body that aims radiaition of cancer cells. Internal radiation  therapy involves placing radiation inside your body, in or near the cancer. Sometimes ppeople get both forms of radiation therapy.  To learn more about  external beam radiation therapy. 3.  What does radiation therapy do to cancer cells? Given in high doses, radiation kills or slows the growth of cancer cells. Radiaton  therapy is used to:  Treat Cancer. Radiation can be used to cure cancer, to prevent it from returning, or to stop or slow its growth  Reduce symptoms. When a cure is not possible, radiation may be used to treat pain and other problems caused by the cancer tumor. Or, it can prevent problems that may be caused by a growing tumor, such as blindness or loss of bowel and bladder control. 4.  How long does radiation therapy take to work? Radiation therapy does not kill cancer cells right away. It takes days or weeks of  treatments before cancer cells start to die. Then, cancer cells keep dying for  weeks or months after radiation therapy ends. 5.  What does radiation therapy do to healthy cells? Radiation not only dills or slows the growth of cancer cells, it can also affect  nearby healthy cells. The healthy cells almost always recover after treatment is  over. But sometimes people may have side effects that are severe or do not get  better. Other side effects may how up months or years after radiation therapy is  over. These are called late side effects. Doctors try to protect healthy cells during treatment by:  Using as low a does of radiation as possible. The radiation dose is balanced between being high enough to kill cancer cells, yet low enough to limit damage to healthy cells. Spreading out treatment over time. You may get radiation therapy once a day, or in smaller doses twice a day for several weeks. Spreading out the radiation dose allows normal cells to recover while cancer cells die. Aimimng radiation at a precise part of your body. Some types of radiation therapy allow your doctor to aim high doses of radiaiton at your cancer while reducing radiation to nearby healthy tissue.  These techniques use a computer to deliver precise radiation doses to a cancer tumor or to specific areas within the tumor. [facility]            Pelvis Side Effects  Diarrhea  Fatigue  Hair loss  Nausea and vomiting  Sexual and fertility changes  Skin changes  Urinary and bladder changes    Ways to Manage Diarrhea   When you have diarrhea:  Drink 8 to 12 cups of clear liquid per day. Severe diarrhea can cause your to become dehydrated, a problem that can become serious. This problem is caused by the loss of too much water from the body. Making sure you drink enough can help prevent it. If you drink liquids that are high in sugar (such as fruit juice, sweet iced tea, Brett-Aid, or Hi-C) ask your nurse or dietitian if you should mix them with extra water. Eat small meals and snacks. Many people find that they eat better if they eat 5 to 6 small means and snacks each day, rather than 3 large meals  Eat foods that are high in salts such as sodium and potassium. Your body can lose these salts when you have diarrhea, and it is important to replace them. Foods that are high in sodium or potassium include bananas, oranges, peach and apricot nectar, and boiled or mashed potatoes. Eat low-fiber foods. Foods that are high in fiber can make diarrhea worse. Low-fiber foods include bananas, white rice, white toast, and plan or vanilla yogurt. Take care of your rectal area. Instead of toilet paper, use a baby wipe or squirt water from a spray bottle to clean yourself after bowel movements. Also, ask your nurse about taking sitz baths, which is a warm-water bath taken in a sitting position that covers only the hips and buttocks. Be sure to tell your doctor or nurse if you rectal area gets sore.   Avoid:  Beer, wine, and other types of alcohol  Milk and dairy foods, such as ice cream, sour cream, and cheese  Spicy foods, such as hot sauce, salsa, chili, and saavedra dishes  Foods or dinks with caffeine, such as regular coffee, black tea, soda and chocolate  Foods or drinks that cause gas, such as cooked dried beans, cabbage, broccoli, soy milk, and other soy products   Foods that are high in fiber, such as raw fruits and vegetables, cooked dried beans, and whole wheat breads and cereals  Fried or greasy foods  Food from [employer]  Talk with your doctor or nurse. Tell him or her if you are having diarrhea. He or she will suggest ways to manage it. He or she may also suggest taking medicine, such as imodium. Fatigue  How long it lasts  When you will first feel fatigue depends on a few factors, such as your age, health, how active you are, and how you felt before radiation therapy started. Fatigue can last from 6 weeks to a year after your last radiation therapy session. Some people may always feel fatigue and not have as much energy as they did before radiation therapy. Ways to Manage Fatigue  Try to sleep at least 8 hours each night. This may be more sleep than you needed before radiation therapy. One way to sleep better at night is to be active during the day. Another way is to relax right before going to bed. Do calming activities before bedtime, such as reading, working on a [employer] puzzle, or listening to music. Plan time to rest. Take short naps or rest breaks between activities. Try not to do too much. With fatigue, you ay not have enough energy to do all the things you want to do. Stay active, but choose the activities that are most important to you. Try to let go of things that don't matter as much now. For example, you might go to work but not do housework. You might watch your children's sprots events but not cook dinner. Exercise. Research shows that most people feel better when they get some exercise each day. Go for a short walk, ride a bike, or do yoga. Talk with your doctor or nurse about types of exercise you can do while having radiation therapy. Relax.  Mediatation, prayer, gentle yoga, guided imagery, and visualization are ways you can learn to relax and decrease stress. For relaxation exercises:  Visit Learning to Relax on the National Cancer [place]'s website at : www.cancer.gov/about-cancer/copingfeelings/relaxation  See Facing Forward: Life After Cancer Treatment at: www.cancer.gov/publications/patient-education/facing-forward  Eat and drink well. It can be easier to eat if you have 5 or 6 small meals each day, rather than 3 large ones. Keep foods around that are easy to fix, such as canned soups, frozen meals, yogurt, and cottage cheese. Drink plenty of fluids each day-about 8 cups of water or juice. Plan a work schedule that is right for you. Fatigue may affect the amount of energy you have for your job. You may feel well enough to work your full schedule, or you may need to work less-maybe just a few hours a day or a few days each week. You may want to talk with your boss about ways to work from home so you do not need to commute. If possible, you may want to think about going on medical leave while you have radiation therapy. Ways to Manage Nausea and Vomiting    Plan when to eat and drink. Some people feel better when they eat before getting radiation therapy and others do not. Learn the best time for you to each and drink. Try a light snack, such as crackers and apple juice 1 to 2 hours before radiation therapy. Or, you might feel better if you have treatment on an empty stomach, which means not eating 2 or 3 hours before treatment. Eat small meals and snacks. Many people find that they eat better if they eat 5 or 6 small meals and snacks each day, rather than 3 large meals. Make sure to eat slowly and do not rush. Have foods and drinks that are at room temperature (not too hot and not too cold). Before eating or drinking, give hot food and drinks a chance to cool down. Warm cold food and drinks in the microwave for a short time. Talk with your doctor or nurse.  He or she may suggest a special diet or prescribe medicine to help prevent nausea. You might also ask your doctor or nurse about acupuncture, which may help relieve nausea and vomiting caused by cancer treatment. Acupuncture is a type of complementary and alternative medicine. It is a technique that involves inserting thin needles through the skin at specific points on the body. Skin Changes  What they are:  Radiation therapy can cause skin changes in your treatment area. Here are some common skin changes:  Redness. Your skin in the treatment area may look as if you have a mild to severe sunburn or tan  Severe itching. The skin in your treatment area may itch very badly. It is important to avoid scratching, which can cause skin breakdown and infection. Skin breakdown is a problem that happens when the skin in the treatment area peels off faster than it can grow back. Dry and peeling skin. The skin in your treatment area can get very dry. It may get so dry that it starts to peel, as if you have had a bad sunburn. If it peels off faster than it can grow back, you may develop sores or ulcers. Moist reaction. The skin in your treatment area can become wet, sore and infected This problem is more common where you have skin folds, such as your buttocks, behind your ears and under your breasts. It may also occur where your skin is very thin, such as your neck. Ways to Manage Skin Changes    Skin Care. Take extra good care of your skin during radiation therapy. Be gentle and do not rub, scrub, or scratch in the treatment area. Use creams that your doctor or nurse suggests. [employer] / [employer] / Speedy   Apply recommended lotion to treatment area 2 times a day. This should begin when you start radiation treatments. Do not put anything on your skin that is very hot or cold. Do not use heating pads, ice packs or other hot or cold items on the treatment area  Be gentle when you shower or take a bath. You can take a lukewarm shower every day.  If you prefer to take a lukewarm bath, so do only every other day and don't soak  For too long. Whether you take a shower or bath, make sure to use a mild soap. Dry yourself with a soft towel by patting, not rubbing, your skin. Be careful not to wash off the ink markings that you need for radiation therapy. Use only those lotions and skin products that your doctor or nurse suggests. If you are using a prescribed cream for a skin problem or acne, tell your doctor or nurse before you begin radiation treatment. Check with your doctor or nurse before using any of the following skin products: Bubble bath     Cornstarch  Cream  Deodorant  Hair removers  Makeup  Oil   Ointment  Perfume  Powder  Soap   Sunscreen  Cool, humid places. Your skin may feel much better when you are in a cool, humid places. You can make rooms more humid by putting a bowl of water on the radiator or using a humidifier. If you use a humidifier, be sure to follow the directions about cleaning it to prevent bacteria. Soft fabrics. Wear clothes and use bed sheets that are made of very soft fabrics. Do not wear clothes in your treatment area that are tight and do not breathe, such as girdles, body shapers, and pantyhose  Protect your skin from the sun every day. The sun can burn you even on cloudy days or when you are outside for just a few minutes. Do not go to the beach or sunbathe. Wear a broad-brimmed hat, long-sleeved shirt, and long pants when you are outside. Talk with your doctor or nurse about sunscreen lotions. He or she may suggest that you use a sunscreen with an SPF of 30 or higher. You will need to protect your skin form the sun even after radiation therapy is over. Do not use tanning beds. Tanning beds expose you to the same harmful effects as the sun. Adhesive tape. Do not put adhesive bandages or other types of sticky tape on your skin in the treatment area. Talk with your doctor or nurse about ways to bandage without tape. Shaving.  Ask your doctor or nurse if you can shave the treatment area. If you can shave, use an electric razor, but do not use a pre-shave liquid. Rectal area. If you have radiation therapy to the rectal area, you are likely to have skin problems. These problems are often worse after a bowel movement. Clean yourself with a baby wipe or squirt of water from a spray bottle. Ask your nurse if sitz baths might help you. Sitz baths are warm-water baths taken in a sitting position that covers only the hips and buttocks. Talk with your doctor or nurse. Some skin changes can be very serious. Your treatment team will check for skin changes each time you have radiation therapy. Make sure to report any skin changes that you notice. Medicine. Medicines can help with some skin changes. These include lotions for dry or itchy skin, antibiotics to treat infection, and drug to reduce swelling or itching. Urinary and Bladder Changes  What they are:Radiation therapy can cause urinary and bladder problems, which can include:  Burning or pain when you begin to urinate or after you urinate (empty your bladder)  Trouble starting to urinate  Trouble emptying your bladder completely  Frequent, urgent need to urinate  Cystitis, a swelling (inflammation) in your urinary tract  Incontinence, when you cannot control the flow of urine from your bladder, especially when coughing or sneezing  Waking frequently to urinate  Blood in your urine  Bladder spasms, which are like painful muscle cramps    Ways to Manage Urinary and Bladder Changes  Drink lots of fluids. Drink 6 to 8 cups of fluids each day, enough so that your urine is clear to light yellow in color  Avoid coffee, black tea, alcohol, spices, and all tobacco products  Talk with your doctor or nurse if you think you have urinary or bladder problems. You may need to provide a urine sample to check whether you have an infection  Talk with your doctor or nurse if you have incontinence.  He or she may refer you to a physical therapist who will assess your problem. The therapist can give you exercises to improve bladder control. Medicine. Your doctor may prescribe antibiotics if your problems are caused by an infection.  Other medicines can help you urinate, reduce burning or pain, and ease bladder spasm  Lisandro Mares

## 2022-11-29 NOTE — TELEPHONE ENCOUNTER
[facility] AT [facility] Oncology Nutrition Note    PGSGA scoring tool reviewed with score <4. Automatic nutrition assessment consult populates from PGSGA tool for scores > 4. Will continue to follow as requested.     TERESITA Fox, RD, LD  Registered Dietitian   [place]  [phone]

## 2022-11-30 ENCOUNTER — APPOINTMENT (OUTPATIENT)
Dept: PHYSICAL THERAPY | Facility: CLINIC | Age: 67
End: 2022-11-30
Payer: MEDICARE

## 2022-12-02 ENCOUNTER — HOSPITAL ENCOUNTER (OUTPATIENT)
Dept: RADIATION ONCOLOGY | Age: 67
Discharge: HOME | End: 2022-12-02
Payer: MEDICARE

## 2022-12-02 PROCEDURE — 77301 RADIOTHERAPY DOSE PLAN IMRT: CPT | Performed by: RADIOLOGY

## 2022-12-02 PROCEDURE — 77338 DESIGN MLC DEVICE FOR IMRT: CPT | Performed by: RADIOLOGY

## 2022-12-02 PROCEDURE — 77300 RADIATION THERAPY DOSE PLAN: CPT | Performed by: RADIOLOGY

## 2022-12-05 ENCOUNTER — TELEPHONE (OUTPATIENT)
Dept: ONCOLOGY | Age: 67
End: 2022-12-05

## 2022-12-05 PROCEDURE — 77336 RADIATION PHYSICS CONSULT: CPT | Performed by: RADIOLOGY

## 2022-12-07 ENCOUNTER — HOSPITAL ENCOUNTER (OUTPATIENT)
Dept: RADIATION ONCOLOGY | Age: 67
Discharge: HOME | End: 2022-12-07
Payer: MEDICARE

## 2022-12-07 PROCEDURE — 77336 RADIATION PHYSICS CONSULT: CPT | Performed by: STUDENT IN AN ORGANIZED HEALTH CARE EDUCATION/TRAINING PROGRAM

## 2022-12-07 PROCEDURE — 77385 HC NTSTY MODUL RAD TX DLVR SMPL: CPT

## 2022-12-07 NOTE — DISCHARGE SUMMARY
[] [facility] [address]  [address]  [phone]  F: [phone] [x] [address]  [facility]   [address]  P: [phone]  F: [phone] [] [place]  [address]  P: [phone]  F: [phone] [] [address]  P: [phone]  F: [phone] [] [address]  [address] [address] [facility]   [address]   [place]: [phone]  F: [phone]      Physical Therapy Discharge Note    Date: 2022      Patient: Kira Florence  : 1955  MRN: [id number]    Physician: Sukumar Bruce DO                                Insurance: medicare/BC/  Medical Diagnosis: cervical radiculopathy                 Rehab Codes: M54.13; M25.60; M79.601; R29.3; M62.521; R20.2  Onset Date: 22                 Next 's appt. : 23  Visit# / total visits: ; Progress note for Medicare patient due at visit 10                                    Cancels/No Shows: 0/1  Date of initial visit: 10/12/22                Date of final visit: 22      Subjective:  Pain:  [x] Yes  [] No   Location: right upper trap  N/A           Pain Rating: (0-10 scale) 0/10  Pain altered Tx:  [x] No  [] Yes  Action:  Comments: feeling well and no radicular symptoms R UE. Objective:  Test Measurements:22: biceps strength: R 5/5; L 5/5; NDI 22: 12% deficit compared to 36% deficit at start of thrapy  Function:    Assessment:Pt with improving posture, however, still with significant forward head and shoulders. The patient has a much better understanding of posture, body mechanics and is more understanding of how to support body in bed so there is less chance of n/t into UE.  Since he has minimal to 0 UE symptoms or neck pain, therapy will be on hold for 2 weeks. Should symptoms return in that time, he is to call and get back on schedule. Otherwise he will be discharged at that time. STG: (to be met in 6 treatments)  ? Pain: below 4/10 max with infrequent R UE symptoms: MET 11/9/22  ? ROM: tolerate flexibility ex and complete them without outside assist: MET 11/9/22  ? Strength: tolerate postural exercises to address weakness: MET 11/9/22  ? Function: sleep restored to baseline: MET 11/9/22  Patient to be independent with home exercise program as demonstrated by performance with correct form without cues.: MET 11/9/22     LTG: (to be met in 12  treatments)  Nearly eliminate all UE symptoms. Have pain in R scapula/cervical region be no more than 2/10: MET 11/9/22  Pain below 3/10 max with minimal to 0 UE symptoms: MET 11/9/22  Assume proper posture and body mechanics correctly and independently: ONGOING 11/9/22  Normalize biceps strength on R to indicate improved mm/nn function: MET 11/9/22  Decreased amount of chronic increase in tone mm as above mentioned: PARTIALLY MET 11/9/22        Patient goals: \"to fix it\"    Treatment to Date:  [x] Therapeutic Exercise      [x] Therapeutic Activity      [x] Instruction in Home Exercise Program                      [x] Manual Therapy                  Discharge Status:     [x] Pt received maximum benefit. No further therapy indicated at this time. [x] Pt to continue exercise/home instructions independently. Electronically signed by Uri Rodriguez PT on 12/7/2022 at 3:51 PM      If you have any questions or concerns, please don't hesitate to call.   Thank you for your referral.

## 2022-12-08 ENCOUNTER — HOSPITAL ENCOUNTER (OUTPATIENT)
Dept: RADIATION ONCOLOGY | Age: 67
Discharge: HOME | End: 2022-12-08
Payer: MEDICARE

## 2022-12-08 PROCEDURE — 77385 HC NTSTY MODUL RAD TX DLVR SMPL: CPT | Performed by: RADIOLOGY

## 2022-12-09 ENCOUNTER — HOSPITAL ENCOUNTER (OUTPATIENT)
Dept: RADIATION ONCOLOGY | Age: 67
Discharge: HOME | End: 2022-12-09
Payer: MEDICARE

## 2022-12-09 PROCEDURE — 77385 HC NTSTY MODUL RAD TX DLVR SMPL: CPT | Performed by: RADIOLOGY

## 2022-12-12 ENCOUNTER — HOSPITAL ENCOUNTER (OUTPATIENT)
Dept: RADIATION ONCOLOGY | Age: 67
Discharge: HOME | End: 2022-12-12
Payer: MEDICARE

## 2022-12-12 PROCEDURE — 77385 HC NTSTY MODUL RAD TX DLVR SMPL: CPT | Performed by: RADIOLOGY

## 2022-12-13 ENCOUNTER — HOSPITAL ENCOUNTER (OUTPATIENT)
Dept: RADIATION ONCOLOGY | Age: 67
Discharge: HOME | End: 2022-12-13
Payer: MEDICARE

## 2022-12-13 PROCEDURE — 77385 HC NTSTY MODUL RAD TX DLVR SMPL: CPT | Performed by: RADIOLOGY

## 2022-12-14 ENCOUNTER — HOSPITAL ENCOUNTER (OUTPATIENT)
Dept: RADIATION ONCOLOGY | Age: 67
Discharge: HOME | End: 2022-12-14
Payer: MEDICARE

## 2022-12-14 VITALS
DIASTOLIC BLOOD PRESSURE: 102 MMHG | RESPIRATION RATE: 16 BRPM | HEART RATE: 60 BPM | SYSTOLIC BLOOD PRESSURE: 184 MMHG | BODY MASS INDEX: 26.85 KG/M2 | WEIGHT: 176.6 LBS | TEMPERATURE: 97.9 F

## 2022-12-14 PROCEDURE — 77336 RADIATION PHYSICS CONSULT: CPT | Performed by: RADIOLOGY

## 2022-12-14 PROCEDURE — 77385 HC NTSTY MODUL RAD TX DLVR SMPL: CPT | Performed by: STUDENT IN AN ORGANIZED HEALTH CARE EDUCATION/TRAINING PROGRAM

## 2022-12-14 NOTE — PROGRESS NOTES
[facility]       Radiation Oncology          [address], [address]        [place]: [phone]        F: [phone]       [address]       Radiation Oncology   [address] [facility], [address] [facility]       [place]: [phone]       F: [phone]       [email or web address]          RADIATION ONCOLOGY WEEKLY PROGRESS NOTE  Patient ID:   Gavi Contreras  : 1955   MRN: [id number]    Location:  [facility] Radiation Oncology,   [address], [place] [address], [facility]   [phone]     DIAGNOSIS:  Cancer Staging  History of prostate cancer  Staging form: Prostate, AJCC 7th Edition  - Clinical stage from 2016: Stage Unknown (T1c, NX, M0, PSA: Less than 10, [place] 7) - Signed by Jaime Morales MD on 2/3/2017  - Pathologic stage from 2017: Stage III (T3a, N0, cM0, Jonnathan 7) - Signed by Jaime Morales MD on 2/3/2017      TREATMENT DETAILS:  Treatment Site: Prostate bed and LNs  Actual Dose: 1080 cGy  Total Planned Dose: 7020cGy  Treatment Technique: IMRT  Fraction Technique: Daily  Therapy imaging monitoring: CBCT daily  Concurrent Chemotherapy: None    SUBJECTIVE:   Patient seen for their weekly on treatment evaluation today. Feeling well, no new symptoms or complaints. Has baseline nocturia x 3-4. OBJECTIVE:   CHAPERONE: Not Required    ECO Asymptomatic    VITAL SIGNS: BP (!) 184/102   Pulse 60   Temp 97.9 °F (36.6 °C) (Temporal)   Resp 16   Wt 176 lb 9.6 oz (80.1 kg)   BMI 26.85 kg/m²   Wt Readings from Last 5 Encounters:   22 176 lb 9.6 oz (80.1 kg)   11/15/22 173 lb 12.8 oz (78.8 kg)   22 174 lb 12.8 oz (79.3 kg)   10/18/22 180 lb (81.6 kg)   10/07/22 179 lb (81.2 kg)     GENERAL:  General appearance is that of a well-nourished, well-developed in no apparent distress. HEENT: Normocephalic, atraumatic, EOMI, moist mucosa, no erythema.    NECK:  No adenopathy or a palpable thyroid mass, trachea is midline. LYMPHATICS: No cervical, supraclavicular, or axillary adenopathy. HEART:  Normal rate and regular rhythm, normal heart sounds. LUNGS:  Pulmonary effort normal. Normal breath sounds. ABDOMEN:  Soft, nontender, non distended, and no hepatosplenomegaly. EXTREMITIES:  No edema. No calf tenderness. MSK:  No spinal tenderness. Normal ROM. NEUROLOGICAL: Alert and oriented. Strength and sensation intact bilaterally. No focal deficits. PSYCH: Mood normal, behavior normal.  SKIN: No erythema, no desquamation. LABS:  WBC   Date Value Ref Range Status   10/14/2022 9.1 3.5 - 11.3 k/uL Final   11/19/2019 5.1 3.5 - 11.3 k/uL Final   02/14/2018 9.0 3.5 - 11.0 k/uL Final     Segs Absolute   Date Value Ref Range Status   10/14/2022 6.39 1.50 - 8.10 k/uL Final   11/19/2019 2.44 1.8 - 7.7 k/uL Final   02/14/2018 5.90 1.8 - 7.7 k/uL Final     Hemoglobin   Date Value Ref Range Status   10/14/2022 15.5 13.0 - 17.0 g/dL Final   11/19/2019 15.0 13.0 - 17.0 g/dL Final   02/14/2018 16.4 13.5 - 17.5 g/dL Final     Platelets   Date Value Ref Range Status   10/14/2022 207 138 - 453 k/uL Final   11/19/2019 182 138 - 453 k/uL Final   02/14/2018 217 130 - 400 k/uL Final     Creatinine   Date Value Ref Range Status   10/14/2022 0.89 0.70 - 1.20 mg/dL Final   11/19/2019 0.74 0.70 - 1.20 mg/dL Final   02/14/2018 0.88 0.70 - 1.20 mg/dL Final     No results found for: , CEA  PSA   Date Value Ref Range Status   11/08/2022 0.13 <4.1 ng/mL Final     Comment:     The Roche \"ECLIA\" assay is used. Results obtained with different assay methods cannot be   used interchangeably. 10/14/2022 0.15 <4.1 ng/mL Final     Comment:     The Roche \"ECLIA\" assay is used. Results obtained with different assay methods cannot be   used interchangeably. 11/19/2019 0.03 <4.1 ug/L Final     Comment:     The Roche \"ECLIA\" assay is used.   Results obtained with different assay methods cannot be   used interchangeably. 02/14/2018 0.01 <4.1 ug/L Final     Comment:     The Roche \"ECLIA\" assay is used. Results obtained with different assay methods   cannot be used interchangeably. Performed at [facility] [address] [facility], [address] [place] [address] [phone]     08/11/2016 4.41 (H) <4.1 ug/L Final     Comment:     The Roche \"ECLIA\" assay is used. Results obtained with different assay methods   cannot be used interchangeably. Performed at [address] [phone]         MEDICATIONS:    Current Outpatient Medications:     albuterol sulfate HFA (PROVENTIL;VENTOLIN;PROAIR) 108 (90 Base) MCG/ACT inhaler, inhale 2 puffs by mouth every 4 hours if needed for wheezing, Disp: 8.5 g, Rfl: 5    tiZANidine (ZANAFLEX) 4 MG tablet, Take 4 mg by mouth every 6 hours as needed, Disp: , Rfl:     escitalopram (LEXAPRO) 20 MG tablet, TAKE ONE TABLET BY MOUTH DAILY, Disp: 90 tablet, Rfl: 1    umeclidinium-vilanterol (ANORO ELLIPTA) 62.5-25 MCG/INH AEPB inhaler, Inhale 1 puff into the lungs daily, Disp: 28 each, Rfl: 0    Budeson-Glycopyrrol-Formoterol (BREZTRI AEROSPHERE) 160-9-4.8 MCG/ACT AERO, Inhale 2 each into the lungs 2 times daily, Disp: 2 Inhaler, Rfl: 0    losartan (COZAAR) 50 MG tablet, Take 1 tablet by mouth daily, Disp: 30 tablet, Rfl: 2    montelukast (SINGULAIR) 10 MG tablet, Take 1 tablet by mouth daily (Patient not taking: No sig reported), Disp: 30 tablet, Rfl: 2    atorvastatin (LIPITOR) 10 MG tablet, Take 1 tablet by mouth daily, Disp: 30 tablet, Rfl: 2    mometasone (NASONEX) 50 MCG/ACT nasal spray, 2 sprays by Nasal route daily (Patient not taking: No sig reported), Disp: 1 Inhaler, Rfl: 3      ASSESSMENT PLAN:   Treatment setup and plan reviewed. Port images/CBCT images reviewed. Appropriate laboratory work was reviewed. Treatment side effects and toxicities reviewed with the patient, and appropriate management was advised.  Will continue radiation treatment as planned, and recommend patient contact us if they have any questions or concerns. Reviewed expected symptoms over course of radiation. Electronically signed by Travis Velasco MD on 12/14/2022 at 1:26 PM      Drugs Prescribed:  New Prescriptions    No medications on file       Other Orders Placed:  No orders of the defined types were placed in this encounter.

## 2022-12-14 NOTE — PROGRESS NOTES
Mancel Mock  12/14/2022  Wt Readings from Last 3 Encounters:   12/14/22 176 lb 9.6 oz (80.1 kg)   11/15/22 173 lb 12.8 oz (78.8 kg)   11/03/22 174 lb 12.8 oz (79.3 kg)     Body mass index is 26.85 kg/m². Treatment Area:  Prostate Bed    Patient was seen today for weekly visit. Comfort Alteration    Fatigue: Mild    Nutritional Alteration  Anorexia: No  Nausea: No  Vomiting: No     Elimination Alterations  Constipation: no  Diarrhea:  no  Urinary Frequency/Urgency: Yes  Urinary Retention: No  Dysuria: No  Urinary Incontinence: No  Proctitis: No  Nocturia: Yes #/night: 5     Emotional  Coping: effective      Skin Alteration   Sensation:wdl    Radiation Dermatitis:  Intact [x]     Erythema  []     Discoloration  []     Rash []     Dry desquamation  []     Moist desquamation []           Injury, potential bleeding or infection:     Lab Results   Component Value Date    WBC 9.1 10/14/2022     10/14/2022         BP (!) 184/102   Pulse 60   Temp 97.9 °F (36.6 °C) (Temporal)   Resp 16   Wt 176 lb 9.6 oz (80.1 kg)   BMI 26.85 kg/m²      Pain Assessment: None - Denies Pain            Assessment/Plan: Patient was seen today for weekly visit. Patient has baseline nocturia of 4-5 times per night and occasional urinary frequency during the day.         Javed Chandler RN

## 2022-12-15 ENCOUNTER — HOSPITAL ENCOUNTER (OUTPATIENT)
Dept: RADIATION ONCOLOGY | Age: 67
Discharge: HOME | End: 2022-12-15
Payer: MEDICARE

## 2022-12-15 PROCEDURE — 77385 HC NTSTY MODUL RAD TX DLVR SMPL: CPT | Performed by: STUDENT IN AN ORGANIZED HEALTH CARE EDUCATION/TRAINING PROGRAM

## 2022-12-16 ENCOUNTER — HOSPITAL ENCOUNTER (OUTPATIENT)
Dept: RADIATION ONCOLOGY | Age: 67
Discharge: HOME | End: 2022-12-16
Payer: MEDICARE

## 2022-12-16 PROCEDURE — 77385 HC NTSTY MODUL RAD TX DLVR SMPL: CPT | Performed by: STUDENT IN AN ORGANIZED HEALTH CARE EDUCATION/TRAINING PROGRAM

## 2022-12-19 ENCOUNTER — HOSPITAL ENCOUNTER (OUTPATIENT)
Dept: RADIATION ONCOLOGY | Age: 67
Discharge: HOME | End: 2022-12-19
Payer: MEDICARE

## 2022-12-19 PROCEDURE — 77385 HC NTSTY MODUL RAD TX DLVR SMPL: CPT | Performed by: RADIOLOGY

## 2022-12-20 ENCOUNTER — HOSPITAL ENCOUNTER (OUTPATIENT)
Dept: RADIATION ONCOLOGY | Age: 67
Discharge: HOME | End: 2022-12-20
Payer: MEDICARE

## 2022-12-20 PROCEDURE — 77385 HC NTSTY MODUL RAD TX DLVR SMPL: CPT | Performed by: RADIOLOGY

## 2022-12-21 ENCOUNTER — HOSPITAL ENCOUNTER (OUTPATIENT)
Dept: RADIATION ONCOLOGY | Age: 67
Discharge: HOME | End: 2022-12-21
Payer: MEDICARE

## 2022-12-21 VITALS
SYSTOLIC BLOOD PRESSURE: 177 MMHG | TEMPERATURE: 98 F | RESPIRATION RATE: 16 BRPM | WEIGHT: 176.2 LBS | DIASTOLIC BLOOD PRESSURE: 90 MMHG | BODY MASS INDEX: 26.79 KG/M2 | OXYGEN SATURATION: 100 %

## 2022-12-21 PROCEDURE — 77336 RADIATION PHYSICS CONSULT: CPT | Performed by: RADIOLOGY

## 2022-12-21 PROCEDURE — 77385 HC NTSTY MODUL RAD TX DLVR SMPL: CPT | Performed by: RADIOLOGY

## 2022-12-21 NOTE — PROGRESS NOTES
Kira Dose  12/21/2022  Wt Readings from Last 3 Encounters:   12/21/22 176 lb 3.2 oz (79.9 kg)   12/14/22 176 lb 9.6 oz (80.1 kg)   11/15/22 173 lb 12.8 oz (78.8 kg)     Body mass index is 26.79 kg/m². Treatment Area:  Prostate Bed    Patient was seen today for weekly visit. Comfort Alteration    Fatigue: Mild    Nutritional Alteration  Anorexia: No  Nausea: No  Vomiting: No     Elimination Alterations  Constipation: no  Diarrhea:  yes- just starting  Urinary Frequency/Urgency: Yes  Urinary Retention: No  Dysuria: No  Urinary Incontinence: No  Proctitis: No  Nocturia: Yes #/night: 5     Emotional  Coping: effective      Skin Alteration   Sensation:wdl    Radiation Dermatitis:  Intact [x]     Erythema  []     Discoloration  []     Rash []     Dry desquamation  []     Moist desquamation []           Injury, potential bleeding or infection:     Lab Results   Component Value Date    WBC 9.1 10/14/2022     10/14/2022         BP (!) 177/90   Temp 98 °F (36.7 °C) (Temporal)   Resp 16   Wt 176 lb 3.2 oz (79.9 kg)   SpO2 100%   BMI 26.79 kg/m²      Pain Assessment: None - Denies Pain            Assessment/Plan: Patient was seen today for weekly visit. Patient has baseline nocturia of 4-5 times per night and states he is now up an additional time. States just starting to have loose stools. Writer advised patient to purchase imodium and follow instructions on package. Writer also advised patient to make sure he is drinking 6-8 eight ounces of water daily. He voices understanding. Complains of dull ache in right flank area. Denies injury to area. Dr. Aaron Valentin notified of assessment and examined patient. Dr. Aaron Valentin felt flank pain was musculoskeletal and dinstructed patient to take ibuprophen and use a sports/muscle cream to area. He voices understanding.     Joseluis Malone RN

## 2022-12-21 NOTE — PROGRESS NOTES
[address]       Radiation Oncology          [address] [facility], [address]        Megan [place]: [phone]        F: [phone]       [address]       Radiation Oncology   [address] [facility], 12[address] [facility]       Megan Lynn: [phone]       F: [phone]       [email or web address]          RADIATION ONCOLOGY WEEKLY PROGRESS NOTE  Patient ID:   Markell Wallis  : 1955   MRN: [id number]    Location:  [facility] Radiation Oncology,   [address] [facility], [facility]   [phone]     DIAGNOSIS:  Cancer Staging  History of prostate cancer  Staging form: Prostate, AJCC 7th Edition  - Clinical stage from 2016: Stage Unknown (T1c, NX, M0, PSA: Less than 10, [place] 7) - Signed by John Eagle MD on 2/3/2017  - Pathologic stage from 2017: Stage III (T3a, N0, cM0, [place] 7) - Signed by John Eagle MD on 2/3/2017      TREATMENT DETAILS:  Treatment Site: Prostate bed and LNs  Actual Dose: 1980 cGy  Total Planned Dose: 7020cGy  Treatment Technique: IMRT  Fraction Technique: Daily  Therapy imaging monitoring: CBCT daily  Concurrent Chemotherapy: None    SUBJECTIVE:   Patient seen for their weekly on treatment evaluation today. Feeling well, no new symptoms or complaints. With no urinary or bowel symptoms. He does note some back pain likely related to muscle strain. He denies any fatigue or changes in appetite.     OBJECTIVE:   CHAPERONE: Not Required    ECO Asymptomatic    VITAL SIGNS: BP (!) 177/90   Temp 98 °F (36.7 °C) (Temporal)   Resp 16   Wt 176 lb 3.2 oz (79.9 kg)   SpO2 100%   BMI 26.79 kg/m²   Wt Readings from Last 5 Encounters:   22 176 lb 3.2 oz (79.9 kg)   22 176 lb 9.6 oz (80.1 kg)   11/15/22 173 lb 12.8 oz (78.8 kg)   22 174 lb 12.8 oz (79.3 kg)   10/18/22 180 lb (81.6 kg)     GENERAL:  General appearance is that of a well-nourished, well-developed in no apparent distress. LUNGS:  Pulmonary effort normal. Normal breath sounds. ABDOMEN:  Soft, nontender, non distended. EXTREMITIES:  No edema. Normal ROM. NEUROLOGICAL: Alert and oriented. Strength and sensation intact bilaterally. No focal deficits. PSYCH: Mood normal, behavior normal.  SKIN: No erythema, no desquamation. LABS:  WBC   Date Value Ref Range Status   10/14/2022 9.1 3.5 - 11.3 k/uL Final   11/19/2019 5.1 3.5 - 11.3 k/uL Final   02/14/2018 9.0 3.5 - 11.0 k/uL Final     Segs Absolute   Date Value Ref Range Status   10/14/2022 6.39 1.50 - 8.10 k/uL Final   11/19/2019 2.44 1.8 - 7.7 k/uL Final   02/14/2018 5.90 1.8 - 7.7 k/uL Final     Hemoglobin   Date Value Ref Range Status   10/14/2022 15.5 13.0 - 17.0 g/dL Final   11/19/2019 15.0 13.0 - 17.0 g/dL Final   02/14/2018 16.4 13.5 - 17.5 g/dL Final     Platelets   Date Value Ref Range Status   10/14/2022 207 138 - 453 k/uL Final   11/19/2019 182 138 - 453 k/uL Final   02/14/2018 217 130 - 400 k/uL Final     Creatinine   Date Value Ref Range Status   10/14/2022 0.89 0.70 - 1.20 mg/dL Final   11/19/2019 0.74 0.70 - 1.20 mg/dL Final   02/14/2018 0.88 0.70 - 1.20 mg/dL Final     No results found for: , CEA  PSA   Date Value Ref Range Status   11/08/2022 0.13 <4.1 ng/mL Final     Comment:     The Roche \"ECLIA\" assay is used. Results obtained with different assay methods cannot be   used interchangeably. 10/14/2022 0.15 <4.1 ng/mL Final     Comment:     The Roche \"ECLIA\" assay is used. Results obtained with different assay methods cannot be   used interchangeably. 11/19/2019 0.03 <4.1 ug/L Final     Comment:     The Roche \"ECLIA\" assay is used. Results obtained with different assay methods cannot be   used interchangeably. 02/14/2018 0.01 <4.1 ug/L Final     Comment:     The Roche \"ECLIA\" assay is used. Results obtained with different assay methods   cannot be used interchangeably.   Performed at [id number] [place] [address] [address] [facility], [address] [facility] [phone]     08/11/2016 4.41 (H) <4.1 ug/L Final     Comment:     The Roche \"ECLIA\" assay is used. Results obtained with different assay methods   cannot be used interchangeably. Performed at [address] [facility], [address] [place] [address] [phone]         MEDICATIONS:    Current Outpatient Medications:     albuterol sulfate HFA (PROVENTIL;VENTOLIN;PROAIR) 108 (90 Base) MCG/ACT inhaler, inhale 2 puffs by mouth every 4 hours if needed for wheezing, Disp: 8.5 g, Rfl: 5    tiZANidine (ZANAFLEX) 4 MG tablet, Take 4 mg by mouth every 6 hours as needed, Disp: , Rfl:     escitalopram (LEXAPRO) 20 MG tablet, TAKE ONE TABLET BY MOUTH DAILY, Disp: 90 tablet, Rfl: 1    umeclidinium-vilanterol (ANORO ELLIPTA) 62.5-25 MCG/INH AEPB inhaler, Inhale 1 puff into the lungs daily, Disp: 28 each, Rfl: 0    Budeson-Glycopyrrol-Formoterol (BREZTRI AEROSPHERE) 160-9-4.8 MCG/ACT AERO, Inhale 2 each into the lungs 2 times daily, Disp: 2 Inhaler, Rfl: 0    losartan (COZAAR) 50 MG tablet, Take 1 tablet by mouth daily, Disp: 30 tablet, Rfl: 2    montelukast (SINGULAIR) 10 MG tablet, Take 1 tablet by mouth daily (Patient not taking: No sig reported), Disp: 30 tablet, Rfl: 2    atorvastatin (LIPITOR) 10 MG tablet, Take 1 tablet by mouth daily, Disp: 30 tablet, Rfl: 2    mometasone (NASONEX) 50 MCG/ACT nasal spray, 2 sprays by Nasal route daily (Patient not taking: No sig reported), Disp: 1 Inhaler, Rfl: 3      ASSESSMENT PLAN:   Treatment setup and plan reviewed. Port images/CBCT images reviewed. Appropriate laboratory work was reviewed. Treatment side effects and toxicities reviewed with the patient, and appropriate management was advised. Will continue radiation treatment as planned, and recommend patient contact us if they have any questions or concerns. Recommend patient try NSAIDs as well as warm compress to help with sore muscle pain.         Electronically signed by Amaya Rdz Kathia Pratt MD on 12/21/2022 at 3:23 PM      Drugs Prescribed:  New Prescriptions    No medications on file       Other Orders Placed:  No orders of the defined types were placed in this encounter.

## 2022-12-22 ENCOUNTER — APPOINTMENT (OUTPATIENT)
Dept: RADIATION ONCOLOGY | Age: 67
End: 2022-12-22
Payer: MEDICARE

## 2022-12-23 ENCOUNTER — APPOINTMENT (OUTPATIENT)
Dept: RADIATION ONCOLOGY | Age: 67
End: 2022-12-23
Payer: MEDICARE

## 2022-12-27 ENCOUNTER — HOSPITAL ENCOUNTER (OUTPATIENT)
Dept: RADIATION ONCOLOGY | Age: 67
Discharge: HOME | End: 2022-12-27
Payer: MEDICARE

## 2022-12-27 PROCEDURE — 77385 HC NTSTY MODUL RAD TX DLVR SMPL: CPT | Performed by: RADIOLOGY

## 2022-12-28 ENCOUNTER — HOSPITAL ENCOUNTER (OUTPATIENT)
Dept: RADIATION ONCOLOGY | Age: 67
Discharge: HOME | End: 2022-12-28
Payer: MEDICARE

## 2022-12-28 PROCEDURE — 77385 HC NTSTY MODUL RAD TX DLVR SMPL: CPT | Performed by: RADIOLOGY

## 2022-12-28 PROCEDURE — 77336 RADIATION PHYSICS CONSULT: CPT | Performed by: RADIOLOGY

## 2022-12-28 NOTE — PROGRESS NOTES
[facility]  12/28[facility]  Wt Readings from Last 3 Encounters:   12/28/22 177 lb 12.8 oz (80.6 kg)   12/21/22 176 lb 3.2 oz (79.9 kg)   12/14/22 176 lb 9.6 oz (80.1 kg)     Body mass index is 27.03 kg/m². Treatment Area:prostate    Patient was seen today for weekly visit. Tolerating treatment well. Dr. Tari Carias will evaluate patient tomorrow. Comfort Alteration    Fatigue: Mild    Nutritional Alteration  Anorexia: No  Nausea: No  Vomiting: No     Elimination Alterations  Constipation: no  Diarrhea:  no  Urinary Frequency/Urgency: Yes  Urinary Retention: No  Dysuria: No  Urinary Incontinence: No  Proctitis: No  Nocturia: Yes #/night: 2     Emotional  Coping: effective      Skin Alteration   Sensation:intact    Radiation Dermatitis:  Intact [x]     Erythema  []     Discoloration  []     Rash []     Dry desquamation  []     Moist desquamation []           Injury, potential bleeding or infection: none    Lab Results   Component Value Date    WBC 9.1 10/14/2022     10/14/2022         BP (!) 171/90   Pulse 64   Temp 97 °F (36.1 °C) (Oral)   Resp 16   Wt 177 lb 12.8 oz (80.6 kg)   SpO2 98%   BMI 27.03 kg/m²      Pain Assessment: None - Denies Pain            Assessment/Plan: Patient was seen today for weekly visit.       Elaina Mccollum RN

## 2022-12-29 ENCOUNTER — HOSPITAL ENCOUNTER (OUTPATIENT)
Dept: RADIATION ONCOLOGY | Age: 67
Discharge: HOME | End: 2022-12-28
Payer: MEDICARE

## 2022-12-29 ENCOUNTER — HOSPITAL ENCOUNTER (OUTPATIENT)
Dept: RADIATION ONCOLOGY | Age: 67
Discharge: HOME | End: 2022-12-29
Payer: MEDICARE

## 2022-12-29 VITALS
DIASTOLIC BLOOD PRESSURE: 95 MMHG | OXYGEN SATURATION: 98 % | SYSTOLIC BLOOD PRESSURE: 192 MMHG | BODY MASS INDEX: 27.03 KG/M2 | WEIGHT: 177.8 LBS | RESPIRATION RATE: 16 BRPM | TEMPERATURE: 98 F | HEART RATE: 61 BPM

## 2022-12-29 PROCEDURE — 77385 HC NTSTY MODUL RAD TX DLVR SMPL: CPT | Performed by: RADIOLOGY

## 2022-12-29 NOTE — PROGRESS NOTES
[address]       Radiation Oncology          [address] [facility]          [address]        Carrie [phone]        F: [phone]       [facility]       Radiation Oncology   [address] [facility], 1240 [facility]       Carrie [phone]       F: [phone]       [email or web address]          RADIATION ONCOLOGY WEEKLY PROGRESS NOTE  Patient ID:   Ronal Chery  : 1955   MRN: [id number]    Location:  [address] [facility],   [address] [facility], [place]   [phone]     DIAGNOSIS:  Cancer Staging  History of prostate cancer  Staging form: Prostate, AJCC 7th Edition  - Clinical stage from 2016: Stage Unknown (T1c, NX, M0, PSA: Less than 10, [place] 7) - Signed by Anabella Landers MD on 2/3/2017  - Pathologic stage from 2017: Stage III (T3a, N0, cM0, Jonnathan 7) - Signed by Anabella Landers MD on 2/3/2017      TREATMENT DETAILS:  Treatment Site: Prostate bed and LNs  Actual Dose: 2520 cGy  Total Planned Dose: 7020cGy  Treatment Technique: IMRT  Fraction Technique: Daily  Therapy imaging monitoring: CBCT daily  Concurrent Chemotherapy: None    SUBJECTIVE:   Patient seen for their weekly on treatment evaluation today. Feeling well, no new symptoms or complaints. With no urinary or bowel symptoms. He had some diarrhea but has been using Imodium daily which helps. He denies any fatigue or changes in appetite.     OBJECTIVE:   CHAPERONE: Not Required    ECO Asymptomatic    VITAL SIGNS: BP (!) 192/95   Pulse 61   Temp 98 °F (36.7 °C) (Oral)   Resp 16   Wt 177 lb 12.8 oz (80.7 kg)   SpO2 98%   BMI 27.03 kg/m²   Wt Readings from Last 5 Encounters:   22 177 lb 12.8 oz (80.7 kg)   22 176 lb 3.2 oz (79.9 kg)   22 176 lb 9.6 oz (80.1 kg)   11/15/22 173 lb 12.8 oz (78.8 kg)   22 174 lb 12.8 oz (79.3 kg)     GENERAL:  General appearance is that of a well-nourished, well-developed in no apparent distress. LUNGS:  Pulmonary effort normal. Normal breath sounds. ABDOMEN:  Soft, nontender, non distended. EXTREMITIES:  No edema. Normal ROM. NEUROLOGICAL: Alert and oriented. Strength and sensation intact bilaterally. No focal deficits. PSYCH: Mood normal, behavior normal.  SKIN: No erythema, no desquamation. LABS:  WBC   Date Value Ref Range Status   10/14/2022 9.1 3.5 - 11.3 k/uL Final   11/19/2019 5.1 3.5 - 11.3 k/uL Final   02/14/2018 9.0 3.5 - 11.0 k/uL Final     Segs Absolute   Date Value Ref Range Status   10/14/2022 6.39 1.50 - 8.10 k/uL Final   11/19/2019 2.44 1.8 - 7.7 k/uL Final   02/14/2018 5.90 1.8 - 7.7 k/uL Final     Hemoglobin   Date Value Ref Range Status   10/14/2022 15.5 13.0 - 17.0 g/dL Final   11/19/2019 15.0 13.0 - 17.0 g/dL Final   02/14/2018 16.4 13.5 - 17.5 g/dL Final     Platelets   Date Value Ref Range Status   10/14/2022 207 138 - 453 k/uL Final   11/19/2019 182 138 - 453 k/uL Final   02/14/2018 217 130 - 400 k/uL Final     Creatinine   Date Value Ref Range Status   10/14/2022 0.89 0.70 - 1.20 mg/dL Final   11/19/2019 0.74 0.70 - 1.20 mg/dL Final   02/14/2018 0.88 0.70 - 1.20 mg/dL Final     No results found for: , CEA  PSA   Date Value Ref Range Status   11/08/2022 0.13 <4.1 ng/mL Final     Comment:     The Roche \"ECLIA\" assay is used. Results obtained with different assay methods cannot be   used interchangeably. 10/14/2022 0.15 <4.1 ng/mL Final     Comment:     The Roche \"ECLIA\" assay is used. Results obtained with different assay methods cannot be   used interchangeably. 11/19/2019 0.03 <4.1 ug/L Final     Comment:     The Roche \"ECLIA\" assay is used. Results obtained with different assay methods cannot be   used interchangeably. 02/14/2018 0.01 <4.1 ug/L Final     Comment:     The Roche \"ECLIA\" assay is used. Results obtained with different assay methods   cannot be used interchangeably.   Performed at [facility] [address] [facility], [address] [place] [address] [phone]     08/11/2016 4.41 (H) <4.1 ug/L Final     Comment:     The Roche \"ECLIA\" assay is used. Results obtained with different assay methods   cannot be used interchangeably. Performed at [address] [facility], [address] [place] [address] [phone]         MEDICATIONS:    Current Outpatient Medications:     albuterol sulfate HFA (PROVENTIL;VENTOLIN;PROAIR) 108 (90 Base) MCG/ACT inhaler, inhale 2 puffs by mouth every 4 hours if needed for wheezing, Disp: 8.5 g, Rfl: 5    tiZANidine (ZANAFLEX) 4 MG tablet, Take 4 mg by mouth every 6 hours as needed, Disp: , Rfl:     escitalopram (LEXAPRO) 20 MG tablet, TAKE ONE TABLET BY MOUTH DAILY, Disp: 90 tablet, Rfl: 1    umeclidinium-vilanterol (ANORO ELLIPTA) 62.5-25 MCG/INH AEPB inhaler, Inhale 1 puff into the lungs daily, Disp: 28 each, Rfl: 0    Budeson-Glycopyrrol-Formoterol (BREZTRI AEROSPHERE) 160-9-4.8 MCG/ACT AERO, Inhale 2 each into the lungs 2 times daily, Disp: 2 Inhaler, Rfl: 0    losartan (COZAAR) 50 MG tablet, Take 1 tablet by mouth daily, Disp: 30 tablet, Rfl: 2    montelukast (SINGULAIR) 10 MG tablet, Take 1 tablet by mouth daily (Patient not taking: No sig reported), Disp: 30 tablet, Rfl: 2    atorvastatin (LIPITOR) 10 MG tablet, Take 1 tablet by mouth daily, Disp: 30 tablet, Rfl: 2    mometasone (NASONEX) 50 MCG/ACT nasal spray, 2 sprays by Nasal route daily (Patient not taking: No sig reported), Disp: 1 Inhaler, Rfl: 3      ASSESSMENT PLAN:   Treatment setup and plan reviewed. Port images/CBCT images reviewed. Appropriate laboratory work was reviewed. Treatment side effects and toxicities reviewed with the patient, and appropriate management was advised. Will continue radiation treatment as planned, and recommend patient contact us if they have any questions or concerns. Recommend patient continue monitoring his bowel symptoms.       Electronically signed by Dilip Velasquez MD on 12/29/2022

## 2022-12-30 ENCOUNTER — HOSPITAL ENCOUNTER (OUTPATIENT)
Dept: RADIATION ONCOLOGY | Age: 67
Discharge: HOME | End: 2022-12-30
Payer: MEDICARE

## 2022-12-30 PROCEDURE — 77385 HC NTSTY MODUL RAD TX DLVR SMPL: CPT | Performed by: RADIOLOGY

## 2023-01-03 ENCOUNTER — HOSPITAL ENCOUNTER (OUTPATIENT)
Dept: RADIATION ONCOLOGY | Age: 68
Discharge: HOME | End: 2023-01-03
Payer: MEDICARE

## 2023-01-03 PROCEDURE — 77385 HC NTSTY MODUL RAD TX DLVR SMPL: CPT | Performed by: RADIOLOGY

## 2023-01-04 ENCOUNTER — HOSPITAL ENCOUNTER (OUTPATIENT)
Dept: RADIATION ONCOLOGY | Age: 68
Discharge: HOME | End: 2023-01-04
Payer: MEDICARE

## 2023-01-04 VITALS
WEIGHT: 176.4 LBS | BODY MASS INDEX: 26.82 KG/M2 | TEMPERATURE: 97 F | DIASTOLIC BLOOD PRESSURE: 87 MMHG | OXYGEN SATURATION: 99 % | SYSTOLIC BLOOD PRESSURE: 156 MMHG | HEART RATE: 49 BPM | RESPIRATION RATE: 16 BRPM

## 2023-01-04 PROCEDURE — 77336 RADIATION PHYSICS CONSULT: CPT | Performed by: RADIOLOGY

## 2023-01-04 PROCEDURE — 77385 HC NTSTY MODUL RAD TX DLVR SMPL: CPT | Performed by: RADIOLOGY

## 2023-01-04 NOTE — PROGRESS NOTES
[address]       Radiation Oncology          [address]          [address]        Ronald Quickst: [phone]        F: [phone]       [facility]       Radiation Oncology   [address] [facility], [address] [facility]3]       Ronald Quickst: [phone]       F: [phone]       [email or web address]          RADIATION ONCOLOGY WEEKLY PROGRESS NOTE  Patient ID:   Steffanie Edmond  : 1955   MRN: [id number]    Location:  [facility] Radiation Oncology,   [address] [facility] omicJohnny Berrios   [phone]     DIAGNOSIS:  Cancer Staging  History of prostate cancer  Staging form: Prostate, AJCC 7th Edition  - Clinical stage from 2016: Stage Unknown (T1c, NX, M0, PSA: Less than 10, [place] 7) - Signed by Sammy Fischer MD on 2/3/2017  - Pathologic stage from 2017: Stage III (T3a, N0, cM0, [place] 7) - Signed by Sammy Fischer MD on 2/3/2017      TREATMENT DETAILS:  Treatment Site: Prostate bed and LNs  Actual Dose: 3060 cGy  Total Planned Dose: 7020 cGy  Treatment Technique: IMRT  Fraction Technique: Daily  Therapy imaging monitoring: CBCT daily  Concurrent Chemotherapy: None    SUBJECTIVE:   Patient seen for their weekly on treatment evaluation today. Feeling well, no new symptoms or complaints. With no urinary or bowel symptoms. He had some diarrhea but has been using Imodium daily which helps. He denies any fatigue or changes in appetite.     OBJECTIVE:   CHAPERONE: Not Required    ECO Asymptomatic    VITAL SIGNS: BP (!) 156/87   Pulse (!) 49   Temp 97 °F (36.1 °C) (Temporal)   Resp 16   Wt 176 lb 6.4 oz (80 kg)   SpO2 99%   BMI 26.82 kg/m²   Wt Readings from Last 5 Encounters:   23 176 lb 6.4 oz (80 kg)   22 177 lb 12.8 oz (80.7 kg)   22 176 lb 3.2 oz (79.9 kg)   22 176 lb 9.6 oz (80.1 kg)   11/15/22 173 lb 12.8 oz (78.8 kg)     GENERAL:  General appearance is that of a well-nourished, well-developed in no apparent distress. LUNGS:  Pulmonary effort normal. Normal breath sounds. ABDOMEN:  Soft, nontender, non distended. EXTREMITIES:  No edema. Normal ROM. NEUROLOGICAL: Alert and oriented. Strength and sensation intact bilaterally. No focal deficits. PSYCH: Mood normal, behavior normal.  SKIN: No erythema, no desquamation. LABS:  WBC   Date Value Ref Range Status   10/14/2022 9.1 3.5 - 11.3 k/uL Final   11/19/2019 5.1 3.5 - 11.3 k/uL Final   02/14/2018 9.0 3.5 - 11.0 k/uL Final     Segs Absolute   Date Value Ref Range Status   10/14/2022 6.39 1.50 - 8.10 k/uL Final   11/19/2019 2.44 1.8 - 7.7 k/uL Final   02/14/2018 5.90 1.8 - 7.7 k/uL Final     Hemoglobin   Date Value Ref Range Status   10/14/2022 15.5 13.0 - 17.0 g/dL Final   11/19/2019 15.0 13.0 - 17.0 g/dL Final   02/14/2018 16.4 13.5 - 17.5 g/dL Final     Platelets   Date Value Ref Range Status   10/14/2022 207 138 - 453 k/uL Final   11/19/2019 182 138 - 453 k/uL Final   02/14/2018 217 130 - 400 k/uL Final     Creatinine   Date Value Ref Range Status   10/14/2022 0.89 0.70 - 1.20 mg/dL Final   11/19/2019 0.74 0.70 - 1.20 mg/dL Final   02/14/2018 0.88 0.70 - 1.20 mg/dL Final     No results found for: , CEA  PSA   Date Value Ref Range Status   11/08/2022 0.13 <4.1 ng/mL Final     Comment:     The Roche \"ECLIA\" assay is used. Results obtained with different assay methods cannot be   used interchangeably. 10/14/2022 0.15 <4.1 ng/mL Final     Comment:     The Roche \"ECLIA\" assay is used. Results obtained with different assay methods cannot be   used interchangeably. 11/19/2019 0.03 <4.1 ug/L Final     Comment:     The Roche \"ECLIA\" assay is used. Results obtained with different assay methods cannot be   used interchangeably. 02/14/2018 0.01 <4.1 ug/L Final     Comment:     The Roche \"ECLIA\" assay is used. Results obtained with different assay methods   cannot be used interchangeably.   Performed at [facility] [address] [facility][1], [address] [place] [address] [phone]     08/11/2016 4.41 (H) <4.1 ug/L Final     Comment:     The Roche \"ECLIA\" assay is used. Results obtained with different assay methods   cannot be used interchangeably. Performed at [address] [facility], [address] [place] [address] [phone]         MEDICATIONS:    Current Outpatient Medications:     albuterol sulfate HFA (PROVENTIL;VENTOLIN;PROAIR) 108 (90 Base) MCG/ACT inhaler, inhale 2 puffs by mouth every 4 hours if needed for wheezing, Disp: 8.5 g, Rfl: 5    tiZANidine (ZANAFLEX) 4 MG tablet, Take 4 mg by mouth every 6 hours as needed, Disp: , Rfl:     escitalopram (LEXAPRO) 20 MG tablet, TAKE ONE TABLET BY MOUTH DAILY, Disp: 90 tablet, Rfl: 1    umeclidinium-vilanterol (ANORO ELLIPTA) 62.5-25 MCG/INH AEPB inhaler, Inhale 1 puff into the lungs daily, Disp: 28 each, Rfl: 0    Budeson-Glycopyrrol-Formoterol (BREZTRI AEROSPHERE) 160-9-4.8 MCG/ACT AERO, Inhale 2 each into the lungs 2 times daily, Disp: 2 Inhaler, Rfl: 0    losartan (COZAAR) 50 MG tablet, Take 1 tablet by mouth daily, Disp: 30 tablet, Rfl: 2    montelukast (SINGULAIR) 10 MG tablet, Take 1 tablet by mouth daily (Patient not taking: No sig reported), Disp: 30 tablet, Rfl: 2    atorvastatin (LIPITOR) 10 MG tablet, Take 1 tablet by mouth daily, Disp: 30 tablet, Rfl: 2    mometasone (NASONEX) 50 MCG/ACT nasal spray, 2 sprays by Nasal route daily (Patient not taking: No sig reported), Disp: 1 Inhaler, Rfl: 3      ASSESSMENT PLAN:   Treatment setup and plan reviewed. Port images/CBCT images reviewed. Appropriate laboratory work was reviewed. Treatment side effects and toxicities reviewed with the patient, and appropriate management was advised. Will continue radiation treatment as planned, and recommend patient contact us if they have any questions or concerns. Recommend patient continue monitoring his bowel symptoms.       Electronically signed by Leatha Hernandez MD on 1/4/2023 at 1:31 PM      Drugs Prescribed:  New Prescriptions    No medications on file       Other Orders Placed:  No orders of the defined types were placed in this encounter.

## 2023-01-04 NOTE — PROGRESS NOTES
Levora Beer  1/4/2023  Wt Readings from Last 3 Encounters:   01/04/23 176 lb 6.4 oz (80 kg)   12/29/22 177 lb 12.8 oz (80.7 kg)   12/21/22 176 lb 3.2 oz (79.9 kg)     Body mass index is 26.82 kg/m². Treatment Area:prostate    Patient was seen today for weekly visit. Denies bowel or bladder complaints. Doing well with treatment. Dr. Crow Cardoza examined patient. No change in treatment plan. Comfort Alteration    Fatigue: Mild    Nutritional Alteration  Anorexia: No  Nausea: No  Vomiting: No     Elimination Alterations  Constipation: no  Diarrhea:  no  Urinary Frequency/Urgency: No  Urinary Retention: No  Dysuria: No  Urinary Incontinence: No  Proctitis: No  Nocturia: Yes #/night: 2     Emotional  Coping: effective      Skin Alteration   Sensation:intact    Radiation Dermatitis:  Intact [x]     Erythema  []     Discoloration  []     Rash []     Dry desquamation  []     Moist desquamation []           Injury, potential bleeding or infection: none    Lab Results   Component Value Date    WBC 9.1 10/14/2022     10/14/2022         BP (!) 156/87   Pulse (!) 49   Temp 97 °F (36.1 °C) (Temporal)   Resp 16   Wt 176 lb 6.4 oz (80 kg)   SpO2 99%   BMI 26.82 kg/m²                   Assessment/Plan: Patient was seen today for weekly visit.       Jordon Morin RN

## 2023-01-05 ENCOUNTER — HOSPITAL ENCOUNTER (OUTPATIENT)
Dept: RADIATION ONCOLOGY | Age: 68
Discharge: HOME | End: 2023-01-05
Payer: MEDICARE

## 2023-01-05 PROCEDURE — 77300 RADIATION THERAPY DOSE PLAN: CPT | Performed by: RADIOLOGY

## 2023-01-05 PROCEDURE — 77338 DESIGN MLC DEVICE FOR IMRT: CPT | Performed by: RADIOLOGY

## 2023-01-05 PROCEDURE — 77385 HC NTSTY MODUL RAD TX DLVR SMPL: CPT | Performed by: RADIOLOGY

## 2023-01-06 ENCOUNTER — HOSPITAL ENCOUNTER (OUTPATIENT)
Dept: RADIATION ONCOLOGY | Age: 68
Discharge: HOME | End: 2023-01-06
Payer: MEDICARE

## 2023-01-06 PROCEDURE — 77385 HC NTSTY MODUL RAD TX DLVR SMPL: CPT | Performed by: RADIOLOGY

## 2023-01-09 ENCOUNTER — HOSPITAL ENCOUNTER (OUTPATIENT)
Dept: RADIATION ONCOLOGY | Age: 68
Discharge: HOME | End: 2023-01-09
Payer: MEDICARE

## 2023-01-09 PROCEDURE — 77385 HC NTSTY MODUL RAD TX DLVR SMPL: CPT | Performed by: RADIOLOGY

## 2023-01-10 ENCOUNTER — HOSPITAL ENCOUNTER (OUTPATIENT)
Dept: RADIATION ONCOLOGY | Age: 68
Discharge: HOME | End: 2023-01-10
Payer: MEDICARE

## 2023-01-10 PROCEDURE — 77385 HC NTSTY MODUL RAD TX DLVR SMPL: CPT | Performed by: RADIOLOGY

## 2023-01-11 ENCOUNTER — HOSPITAL ENCOUNTER (OUTPATIENT)
Dept: RADIATION ONCOLOGY | Age: 68
Discharge: HOME | End: 2023-01-11
Payer: MEDICARE

## 2023-01-11 VITALS
BODY MASS INDEX: 26.76 KG/M2 | DIASTOLIC BLOOD PRESSURE: 85 MMHG | TEMPERATURE: 97.8 F | RESPIRATION RATE: 16 BRPM | SYSTOLIC BLOOD PRESSURE: 154 MMHG | WEIGHT: 176 LBS | OXYGEN SATURATION: 98 % | HEART RATE: 51 BPM

## 2023-01-11 PROCEDURE — 77385 HC NTSTY MODUL RAD TX DLVR SMPL: CPT | Performed by: RADIOLOGY

## 2023-01-11 NOTE — PROGRESS NOTES
Warren Deng  1/11/2023  Wt Readings from Last 3 Encounters:   01/11/23 176 lb (79.8 kg)   01/04/23 176 lb 6.4 oz (80 kg)   12/29/22 177 lb 12.8 oz (80.7 kg)     Body mass index is 26.76 kg/m². Treatment Area:prostate    Patient was seen today for weekly visit. Having diarrhea 3-4 times daily. Taking imodium. States he has the urge to void within and hour of voiding . Dr. Kalpesh Denson notified of assessment and examined patient. No change in treatment plan. Comfort Alteration    Fatigue: Mild    Nutritional Alteration  Anorexia: No  Nausea: No  Vomiting: No     Elimination Alterations  Constipation: no  Diarrhea:  yes  Urinary Frequency/Urgency: yes  Urinary Retention: No  Dysuria: No  Urinary Incontinence: No  Proctitis: No  Nocturia: Yes #/night: 2     Emotional  Coping: effective      Skin Alteration   Sensation:intact    Radiation Dermatitis:  Intact [x]     Erythema  []     Discoloration  []     Rash []     Dry desquamation  []     Moist desquamation []           Injury, potential bleeding or infection: none    Lab Results   Component Value Date    WBC 9.1 10/14/2022     10/14/2022         BP (!) 154/85   Pulse 51   Temp 97.8 °F (36.6 °C) (Temporal)   Resp 16   Wt 176 lb (79.8 kg)   SpO2 98%   BMI 26.76 kg/m²      Pain Assessment: None - Denies Pain            Assessment/Plan: Patient was seen today for weekly visit.       Martínez Hutchinson RN

## 2023-01-11 NOTE — PROGRESS NOTES
[address]       Radiation Oncology          [address] [facility]          [address]        Ivanna Tai: [phone]        F: [phone]       [address]       Radiation Oncology   [address] [facility], [address] [facility]       Ivanna Tai: [phone]       F: [phone]       [place]          RADIATION ONCOLOGY WEEKLY PROGRESS NOTE  Patient ID:   Meda Severs  : 1955   MRN: [id number]    Location:  [facility] Radiation Oncology,   [address] [facility], [address]   [phone]     DIAGNOSIS:  Cancer Staging  History of prostate cancer  Staging form: Prostate, AJCC 7th Edition  - Clinical stage from 2016: Stage Unknown (T1c, NX, M0, PSA: Less than 10, [place] 7) - Signed by Lopez Brunson MD on 2/3/2017  - Pathologic stage from 2017: Stage III (T3a, N0, cM0, [place] 7) - Signed by Lopez Brunson MD on 2/3/2017      TREATMENT DETAILS:  Treatment Site: Prostate bed and LNs  Actual Dose: 3960 cGy  Total Planned Dose: 7020 cGy  Treatment Technique: IMRT  Fraction Technique: Daily  Therapy imaging monitoring: CBCT daily  Concurrent Chemotherapy: None    SUBJECTIVE:   Patient seen for their weekly on treatment evaluation today. Feeling well, though he has more urinary symptoms of urgency. He denies any bowel symptoms. He had some diarrhea but has been using Imodium daily which helps. He denies any fatigue or changes in appetite. OBJECTIVE:   CHAPERONE: Not Required    ECO Asymptomatic    VITAL SIGNS: /85, HR 51, Temp 97.8F, RR 16, SpO2 98%  There were no vitals taken for this visit.   Wt Readings from Last 5 Encounters:   23 176 lb (79.8 kg)   23 176 lb 6.4 oz (80 kg)   22 177 lb 12.8 oz (80.7 kg)   22 176 lb 3.2 oz (79.9 kg)   22 176 lb 9.6 oz (80.1 kg)     GENERAL:  General appearance is that of a well-nourished, well-developed in no apparent distress. LUNGS:  Pulmonary effort normal. Normal breath sounds. ABDOMEN:  Soft, nontender, non distended. EXTREMITIES:  No edema. Normal ROM. NEUROLOGICAL: Alert and oriented. Strength and sensation intact bilaterally. No focal deficits. PSYCH: Mood normal, behavior normal.  SKIN: No erythema, no desquamation. LABS:  WBC   Date Value Ref Range Status   10/14/2022 9.1 3.5 - 11.3 k/uL Final   11/19/2019 5.1 3.5 - 11.3 k/uL Final   02/14/2018 9.0 3.5 - 11.0 k/uL Final     Segs Absolute   Date Value Ref Range Status   10/14/2022 6.39 1.50 - 8.10 k/uL Final   11/19/2019 2.44 1.8 - 7.7 k/uL Final   02/14/2018 5.90 1.8 - 7.7 k/uL Final     Hemoglobin   Date Value Ref Range Status   10/14/2022 15.5 13.0 - 17.0 g/dL Final   11/19/2019 15.0 13.0 - 17.0 g/dL Final   02/14/2018 16.4 13.5 - 17.5 g/dL Final     Platelets   Date Value Ref Range Status   10/14/2022 207 138 - 453 k/uL Final   11/19/2019 182 138 - 453 k/uL Final   02/14/2018 217 130 - 400 k/uL Final     Creatinine   Date Value Ref Range Status   10/14/2022 0.89 0.70 - 1.20 mg/dL Final   11/19/2019 0.74 0.70 - 1.20 mg/dL Final   02/14/2018 0.88 0.70 - 1.20 mg/dL Final     No results found for: , CEA  PSA   Date Value Ref Range Status   11/08/2022 0.13 <4.1 ng/mL Final     Comment:     The Roche \"ECLIA\" assay is used. Results obtained with different assay methods cannot be   used interchangeably. 10/14/2022 0.15 <4.1 ng/mL Final     Comment:     The Roche \"ECLIA\" assay is used. Results obtained with different assay methods cannot be   used interchangeably. 11/19/2019 0.03 <4.1 ug/L Final     Comment:     The Roche \"ECLIA\" assay is used. Results obtained with different assay methods cannot be   used interchangeably. 02/14/2018 0.01 <4.1 ug/L Final     Comment:     The Roche \"ECLIA\" assay is used. Results obtained with different assay methods   cannot be used interchangeably.   Performed at [facility] [place], [place] [address] [phone]     08/11/2016 4.41 (H) <4.1 ug/L Final     Comment:     The Roche \"ECLIA\" assay is used. Results obtained with different assay methods   cannot be used interchangeably. Performed at [facility], [address] [place] [address] [phone]         MEDICATIONS:    Current Outpatient Medications:     albuterol sulfate HFA (PROVENTIL;VENTOLIN;PROAIR) 108 (90 Base) MCG/ACT inhaler, inhale 2 puffs by mouth every 4 hours if needed for wheezing, Disp: 8.5 g, Rfl: 5    tiZANidine (ZANAFLEX) 4 MG tablet, Take 4 mg by mouth every 6 hours as needed, Disp: , Rfl:     escitalopram (LEXAPRO) 20 MG tablet, TAKE ONE TABLET BY MOUTH DAILY, Disp: 90 tablet, Rfl: 1    umeclidinium-vilanterol (ANORO ELLIPTA) 62.5-25 MCG/INH AEPB inhaler, Inhale 1 puff into the lungs daily, Disp: 28 each, Rfl: 0    Budeson-Glycopyrrol-Formoterol (BREZTRI AEROSPHERE) 160-9-4.8 MCG/ACT AERO, Inhale 2 each into the lungs 2 times daily, Disp: 2 Inhaler, Rfl: 0    losartan (COZAAR) 50 MG tablet, Take 1 tablet by mouth daily, Disp: 30 tablet, Rfl: 2    montelukast (SINGULAIR) 10 MG tablet, Take 1 tablet by mouth daily (Patient not taking: No sig reported), Disp: 30 tablet, Rfl: 2    atorvastatin (LIPITOR) 10 MG tablet, Take 1 tablet by mouth daily, Disp: 30 tablet, Rfl: 2    mometasone (NASONEX) 50 MCG/ACT nasal spray, 2 sprays by Nasal route daily (Patient not taking: No sig reported), Disp: 1 Inhaler, Rfl: 3      ASSESSMENT PLAN:   Treatment setup and plan reviewed. Port images/CBCT images reviewed. Appropriate laboratory work was reviewed. Treatment side effects and toxicities reviewed with the patient, and appropriate management was advised. Will continue radiation treatment as planned, and recommend patient contact us if they have any questions or concerns. Recommend patient continue monitoring his bowel symptoms. Recommend using Ibuprofen for some bladder relief.       Electronically signed by Fredi Post MD on 1/11/2023 at 4:11 PM      Drugs Prescribed:  New Prescriptions    No medications on file       Other Orders Placed:  No orders of the defined types were placed in this encounter.

## 2023-01-12 ENCOUNTER — HOSPITAL ENCOUNTER (OUTPATIENT)
Dept: RADIATION ONCOLOGY | Age: 68
Discharge: HOME | End: 2023-01-12
Payer: MEDICARE

## 2023-01-12 PROCEDURE — 77385 HC NTSTY MODUL RAD TX DLVR SMPL: CPT | Performed by: RADIOLOGY

## 2023-01-13 ENCOUNTER — HOSPITAL ENCOUNTER (OUTPATIENT)
Dept: RADIATION ONCOLOGY | Age: 68
Discharge: HOME | End: 2023-01-13
Payer: MEDICARE

## 2023-01-13 PROCEDURE — 77385 HC NTSTY MODUL RAD TX DLVR SMPL: CPT | Performed by: RADIOLOGY

## 2023-01-16 ENCOUNTER — APPOINTMENT (OUTPATIENT)
Dept: RADIATION ONCOLOGY | Age: 68
End: 2023-01-16
Payer: MEDICARE

## 2023-01-16 PROCEDURE — 77385 HC NTSTY MODUL RAD TX DLVR SMPL: CPT | Performed by: RADIOLOGY

## 2023-01-17 ENCOUNTER — APPOINTMENT (OUTPATIENT)
Dept: RADIATION ONCOLOGY | Age: 68
End: 2023-01-17
Payer: MEDICARE

## 2023-01-17 PROCEDURE — 77385 HC NTSTY MODUL RAD TX DLVR SMPL: CPT | Performed by: RADIOLOGY

## 2023-01-18 ENCOUNTER — APPOINTMENT (OUTPATIENT)
Dept: RADIATION ONCOLOGY | Age: 68
End: 2023-01-18
Payer: MEDICARE

## 2023-01-18 VITALS
WEIGHT: 175.2 LBS | RESPIRATION RATE: 16 BRPM | DIASTOLIC BLOOD PRESSURE: 97 MMHG | BODY MASS INDEX: 26.64 KG/M2 | HEART RATE: 60 BPM | SYSTOLIC BLOOD PRESSURE: 192 MMHG | TEMPERATURE: 97.3 F

## 2023-01-18 PROCEDURE — 77385 HC NTSTY MODUL RAD TX DLVR SMPL: CPT | Performed by: RADIOLOGY

## 2023-01-18 NOTE — PROGRESS NOTES
Bibi Garciadoris  1/18/2023  Wt Readings from Last 3 Encounters:   01/18/23 175 lb 3.2 oz (79.5 kg)   01/11/23 176 lb (79.8 kg)   01/04/23 176 lb 6.4 oz (80 kg)     Body mass index is 26.64 kg/m². Treatment Area:  Prostate Bed    Patient was seen today for weekly visit. Comfort Alteration    Fatigue: Mild    Nutritional Alteration  Anorexia: No  Nausea: No  Vomiting: No     Elimination Alterations  Constipation: no  Diarrhea:  no  Urinary Frequency/Urgency: Yes  Urinary Retention: No  Dysuria: No  Urinary Incontinence: No  Proctitis: No  Nocturia: Yes #/night: 5     Emotional  Coping: effective      Skin Alteration   Sensation:wdl    Radiation Dermatitis:  Intact [x]     Erythema  []     Discoloration  []     Rash []     Dry desquamation  []     Moist desquamation []           Injury, potential bleeding or infection:     Lab Results   Component Value Date    WBC 9.1 10/14/2022     10/14/2022         BP (!) 192/97   Pulse 60   Temp 97.3 °F (36.3 °C) (Temporal)   Resp 16   Wt 175 lb 3.2 oz (79.5 kg)   BMI 26.64 kg/m²      Pain Assessment: None - Denies Pain            Assessment/Plan: Patient was seen today for weekly visit. Denies bothersome or significant urinary symptoms or side effects of radiation treatments.     Clay Rodarte RN

## 2023-01-18 NOTE — PROGRESS NOTES
[place]       Radiation Oncology          [address] [place] [address]        O: [phone]        F: [phone]       [place]      [facility]       Radiation Oncology   [address] [place] [address]       O: [phone]       F: [phone]       [place]          RADIATION ONCOLOGY WEEKLY PROGRESS NOTE  Patient ID:   Sukh Piña  : 1955   MRN: [id number]    Location:  [facility] Radiation Oncology,   [address] [facility], [place] [id number]   [phone]     DIAGNOSIS:  Cancer Staging  History of prostate cancer  Staging form: Prostate, AJCC 7th Edition  - Clinical stage from 2016: Stage Unknown (T1c, NX, M0, PSA: Less than 10, [place] 7) - Signed by Jt Natarajan MD on 2/3/2017  - Pathologic stage from 2017: Stage III (T3a, N0, cM0, [place] 7) - Signed by Jt Natarajan MD on 2/3/2017      TREATMENT DETAILS:  Treatment Site: Prostate bed and LNs  Actual Dose: 4860 cGy  Total Planned Dose: 7020 cGy  Treatment Technique: IMRT  Fraction Technique: Daily  Therapy imaging monitoring: CBCT daily  Concurrent Chemotherapy: None    SUBJECTIVE:   Patient seen for their weekly on treatment evaluation today. Feeling well, though he has more urinary symptoms of urgency. He denies any bowel symptoms.  He denies any diarrhea but has Imodium if needed.  He denies any fatigue or changes in appetite.    OBJECTIVE:   CHAPERONE: Not Required    ECO Asymptomatic    VITAL SIGNS: BP (!) 192/97   Pulse 60   Temp 97.3 °F (36.3 °C) (Temporal)   Resp 16   Wt 175 lb 3.2 oz (79.5 kg)   BMI 26.64 kg/m²   Wt Readings from Last 5 Encounters:   23 175 lb 3.2 oz (79.5 kg)   23 176 lb (79.8 kg)   23 176 lb 6.4 oz (80 kg)   22 177 lb 12.8 oz (80.7 kg)   22 176 lb 3.2 oz (79.9 kg)     GENERAL:  General appearance is that of a well-nourished, well-developed  in no apparent distress. LUNGS:  Pulmonary effort normal. Normal breath sounds. ABDOMEN:  Soft, nontender, non distended. EXTREMITIES:  No edema. Normal ROM. NEUROLOGICAL: Alert and oriented. Strength and sensation intact bilaterally. No focal deficits. PSYCH: Mood normal, behavior normal.  SKIN: No erythema, no desquamation. LABS:  WBC   Date Value Ref Range Status   10/14/2022 9.1 3.5 - 11.3 k/uL Final   11/19/2019 5.1 3.5 - 11.3 k/uL Final   02/14/2018 9.0 3.5 - 11.0 k/uL Final     Segs Absolute   Date Value Ref Range Status   10/14/2022 6.39 1.50 - 8.10 k/uL Final   11/19/2019 2.44 1.8 - 7.7 k/uL Final   02/14/2018 5.90 1.8 - 7.7 k/uL Final     Hemoglobin   Date Value Ref Range Status   10/14/2022 15.5 13.0 - 17.0 g/dL Final   11/19/2019 15.0 13.0 - 17.0 g/dL Final   02/14/2018 16.4 13.5 - 17.5 g/dL Final     Platelets   Date Value Ref Range Status   10/14/2022 207 138 - 453 k/uL Final   11/19/2019 182 138 - 453 k/uL Final   02/14/2018 217 130 - 400 k/uL Final     Creatinine   Date Value Ref Range Status   10/14/2022 0.89 0.70 - 1.20 mg/dL Final   11/19/2019 0.74 0.70 - 1.20 mg/dL Final   02/14/2018 0.88 0.70 - 1.20 mg/dL Final     No results found for: , CEA  PSA   Date Value Ref Range Status   11/08/2022 0.13 <4.1 ng/mL Final     Comment:     The Roche \"ECLIA\" assay is used. Results obtained with different assay methods cannot be   used interchangeably. 10/14/2022 0.15 <4.1 ng/mL Final     Comment:     The Roche \"ECLIA\" assay is used. Results obtained with different assay methods cannot be   used interchangeably. 11/19/2019 0.03 <4.1 ug/L Final     Comment:     The Roche \"ECLIA\" assay is used. Results obtained with different assay methods cannot be   used interchangeably. 02/14/2018 0.01 <4.1 ug/L Final     Comment:     The Roche \"ECLIA\" assay is used. Results obtained with different assay methods   cannot be used interchangeably.   Performed at  [facility], [address] [place] [address] [phone]     08/11/2016 4.41 (H) <4.1 ug/L Final     Comment:     The Roche \"ECLIA\" assay is used. Results obtained with different assay methods   cannot be used interchangeably. Performed at [address] [facility], [address] [place] [address] [phone]         MEDICATIONS:    Current Outpatient Medications:     albuterol sulfate HFA (PROVENTIL;VENTOLIN;PROAIR) 108 (90 Base) MCG/ACT inhaler, inhale 2 puffs by mouth every 4 hours if needed for wheezing, Disp: 8.5 g, Rfl: 5    tiZANidine (ZANAFLEX) 4 MG tablet, Take 4 mg by mouth every 6 hours as needed, Disp: , Rfl:     escitalopram (LEXAPRO) 20 MG tablet, TAKE ONE TABLET BY MOUTH DAILY, Disp: 90 tablet, Rfl: 1    umeclidinium-vilanterol (ANORO ELLIPTA) 62.5-25 MCG/INH AEPB inhaler, Inhale 1 puff into the lungs daily, Disp: 28 each, Rfl: 0    Budeson-Glycopyrrol-Formoterol (BREZTRI AEROSPHERE) 160-9-4.8 MCG/ACT AERO, Inhale 2 each into the lungs 2 times daily, Disp: 2 Inhaler, Rfl: 0    losartan (COZAAR) 50 MG tablet, Take 1 tablet by mouth daily, Disp: 30 tablet, Rfl: 2    montelukast (SINGULAIR) 10 MG tablet, Take 1 tablet by mouth daily (Patient not taking: No sig reported), Disp: 30 tablet, Rfl: 2    atorvastatin (LIPITOR) 10 MG tablet, Take 1 tablet by mouth daily, Disp: 30 tablet, Rfl: 2    mometasone (NASONEX) 50 MCG/ACT nasal spray, 2 sprays by Nasal route daily (Patient not taking: No sig reported), Disp: 1 Inhaler, Rfl: 3      ASSESSMENT PLAN:   Treatment setup and plan reviewed. Port images/CBCT images reviewed. Appropriate laboratory work was reviewed. Treatment side effects and toxicities reviewed with the patient, and appropriate management was advised. Will continue radiation treatment as planned, and recommend patient contact us if they have any questions or concerns. Recommend patient continue monitoring his bowel symptoms.      Electronically signed by Sabiha Musa MD on 1/18/2023 at 1:56 PM      Drugs Prescribed:  New Prescriptions    No medications on file       Other Orders Placed:  No orders of the defined types were placed in this encounter.

## 2023-01-19 ENCOUNTER — APPOINTMENT (OUTPATIENT)
Dept: RADIATION ONCOLOGY | Age: 68
End: 2023-01-19
Payer: MEDICARE

## 2023-01-20 ENCOUNTER — APPOINTMENT (OUTPATIENT)
Dept: RADIATION ONCOLOGY | Age: 68
End: 2023-01-20
Payer: MEDICARE

## 2023-01-20 PROCEDURE — 77385 HC NTSTY MODUL RAD TX DLVR SMPL: CPT | Performed by: RADIOLOGY

## 2023-01-23 ENCOUNTER — APPOINTMENT (OUTPATIENT)
Dept: RADIATION ONCOLOGY | Age: 68
End: 2023-01-23
Payer: MEDICARE

## 2023-01-23 PROCEDURE — 77385 HC NTSTY MODUL RAD TX DLVR SMPL: CPT | Performed by: RADIOLOGY

## 2023-01-23 RX ORDER — LOSARTAN POTASSIUM 50 MG/1
50 TABLET ORAL DAILY
Qty: 30 TABLET | Refills: 2 | Status: SHIPPED | OUTPATIENT
Start: 2023-01-23

## 2023-01-23 NOTE — TELEPHONE ENCOUNTER
Debra Umana is calling to request a refill on the following medication(s):    Medication Request:  Requested Prescriptions     Pending Prescriptions Disp Refills    losartan (COZAAR) 50 MG tablet 30 tablet 2     Sig: Take 1 tablet by mouth daily       Last Visit Date (If Applicable):  59/9/5874    Next Visit Date:    4/7/2023

## 2023-01-24 ENCOUNTER — APPOINTMENT (OUTPATIENT)
Dept: RADIATION ONCOLOGY | Age: 68
End: 2023-01-24
Payer: MEDICARE

## 2023-01-24 PROCEDURE — 77385 HC NTSTY MODUL RAD TX DLVR SMPL: CPT | Performed by: RADIOLOGY

## 2023-01-25 ENCOUNTER — APPOINTMENT (OUTPATIENT)
Dept: RADIATION ONCOLOGY | Age: 68
End: 2023-01-25
Payer: MEDICARE

## 2023-01-25 VITALS
OXYGEN SATURATION: 98 % | SYSTOLIC BLOOD PRESSURE: 179 MMHG | DIASTOLIC BLOOD PRESSURE: 94 MMHG | BODY MASS INDEX: 26.61 KG/M2 | HEART RATE: 59 BPM | RESPIRATION RATE: 16 BRPM | TEMPERATURE: 97 F | WEIGHT: 175 LBS

## 2023-01-25 PROCEDURE — 77385 HC NTSTY MODUL RAD TX DLVR SMPL: CPT | Performed by: RADIOLOGY

## 2023-01-25 PROCEDURE — 77336 RADIATION PHYSICS CONSULT: CPT | Performed by: RADIOLOGY

## 2023-01-25 NOTE — PROGRESS NOTES
Levora Beer  1/25/2023  Wt Readings from Last 3 Encounters:   01/18/23 175 lb 3.2 oz (79.5 kg)   01/11/23 176 lb (79.8 kg)   01/04/23 176 lb 6.4 oz (80 kg)     There is no height or weight on file to calculate BMI. Treatment Area:  Prostate Bed    Patient was seen today for weekly visit. States bowels still loose and is taking Imodium. Encouraged patient to increase oral fluids due to diarrhea. He voices understanding. Dr. Crow Cardoza examined patient. Continue current treatment plan. Comfort Alteration    Fatigue: Mild    Nutritional Alteration  Anorexia: No  Nausea: No  Vomiting: No     Elimination Alterations  Constipation: no  Diarrhea:  no  Urinary Frequency/Urgency: Yes  Urinary Retention: No  Dysuria: No  Urinary Incontinence: No  Proctitis: No  Nocturia: Yes #/night:4- 5     Emotional  Coping: effective      Skin Alteration   Sensation:wdl    Radiation Dermatitis:  Intact [x]     Erythema  []     Discoloration  []     Rash []     Dry desquamation  []     Moist desquamation []           Injury, potential bleeding or infection: Oral fluids encouraged due to diarrhea. Lab Results   Component Value Date    WBC 9.1 10/14/2022     10/14/2022         There were no vitals taken for this visit.                   Assessment/Plan: Jordon Morin RN

## 2023-01-25 NOTE — PROGRESS NOTES
[address]       Radiation Oncology          [address]          [address]        Zohreh Dennisar: [phone]        F: [phone]       [address]       Radiation Oncology   [address] [facility], [address]       Zohreh Reyes: [phone]       F: [phone]       [email or web address]          RADIATION ONCOLOGY WEEKLY PROGRESS NOTE  Patient ID:   Mckinley Barragan  : 1955   MRN: [id number]    Location:  [facility] Radiation Oncology,   [address] Johnny Mendez   [phone]     DIAGNOSIS:  Cancer Staging  History of prostate cancer  Staging form: Prostate, AJCC 7th Edition  - Clinical stage from 2016: Stage Unknown (T1c, NX, M0, PSA: Less than 10, [place] 7) - Signed by Alina Roberts MD on 2/3/2017  - Pathologic stage from 2017: Stage III (T3a, N0, cM0, [place] 7) - Signed by Alina Roberts MD on 2/3/2017      TREATMENT DETAILS:  Treatment Site: Prostate bed and LNs  Actual Dose: 5580 cGy  Total Planned Dose: 7020 cGy  Treatment Technique: IMRT  Fraction Technique: Daily  Therapy imaging monitoring: CBCT daily  Concurrent Chemotherapy: None    SUBJECTIVE:   Patient seen for their weekly on treatment evaluation today. Feeling well, though he has more urinary symptoms of urgency. He he does have diarrhea but is using Imodium daily. He denies any fatigue or changes in appetite.     OBJECTIVE:   CHAPERONE: Not Required    ECO Asymptomatic    VITAL SIGNS: BP (!) 179/94   Pulse 59   Temp 97 °F (36.1 °C) (Temporal)   Resp 16   Wt 175 lb (79.4 kg)   SpO2 98%   BMI 26.61 kg/m²   Wt Readings from Last 5 Encounters:   23 175 lb (79.4 kg)   23 175 lb 3.2 oz (79.5 kg)   23 176 lb (79.8 kg)   23 176 lb 6.4 oz (80 kg)   22 177 lb 12.8 oz (80.7 kg)     GENERAL:  General appearance is that of a well-nourished, well-developed in no apparent distress. LUNGS:  Pulmonary effort normal. Normal breath sounds. ABDOMEN:  Soft, nontender, non distended. EXTREMITIES:  No edema. Normal ROM. NEUROLOGICAL: Alert and oriented. Strength and sensation intact bilaterally. No focal deficits. PSYCH: Mood normal, behavior normal.  SKIN: No erythema, no desquamation. LABS:  WBC   Date Value Ref Range Status   10/14/2022 9.1 3.5 - 11.3 k/uL Final   11/19/2019 5.1 3.5 - 11.3 k/uL Final   02/14/2018 9.0 3.5 - 11.0 k/uL Final     Segs Absolute   Date Value Ref Range Status   10/14/2022 6.39 1.50 - 8.10 k/uL Final   11/19/2019 2.44 1.8 - 7.7 k/uL Final   02/14/2018 5.90 1.8 - 7.7 k/uL Final     Hemoglobin   Date Value Ref Range Status   10/14/2022 15.5 13.0 - 17.0 g/dL Final   11/19/2019 15.0 13.0 - 17.0 g/dL Final   02/14/2018 16.4 13.5 - 17.5 g/dL Final     Platelets   Date Value Ref Range Status   10/14/2022 207 138 - 453 k/uL Final   11/19/2019 182 138 - 453 k/uL Final   02/14/2018 217 130 - 400 k/uL Final     Creatinine   Date Value Ref Range Status   10/14/2022 0.89 0.70 - 1.20 mg/dL Final   11/19/2019 0.74 0.70 - 1.20 mg/dL Final   02/14/2018 0.88 0.70 - 1.20 mg/dL Final     No results found for: , CEA  PSA   Date Value Ref Range Status   11/08/2022 0.13 <4.1 ng/mL Final     Comment:     The Roche \"ECLIA\" assay is used. Results obtained with different assay methods cannot be   used interchangeably. 10/14/2022 0.15 <4.1 ng/mL Final     Comment:     The Roche \"ECLIA\" assay is used. Results obtained with different assay methods cannot be   used interchangeably. 11/19/2019 0.03 <4.1 ug/L Final     Comment:     The Roche \"ECLIA\" assay is used. Results obtained with different assay methods cannot be   used interchangeably. 02/14/2018 0.01 <4.1 ug/L Final     Comment:     The Roche \"ECLIA\" assay is used. Results obtained with different assay methods   cannot be used interchangeably.   Performed at [facility] [place], [place] [address] [phone]     08/11/2016 4.41 (H) <4.1 ug/L Final     Comment:     The Roche \"ECLIA\" assay is used. Results obtained with different assay methods   cannot be used interchangeably. Performed at [facility], [address] [place] [address] [phone]         MEDICATIONS:    Current Outpatient Medications:     losartan (COZAAR) 50 MG tablet, Take 1 tablet by mouth daily, Disp: 30 tablet, Rfl: 2    albuterol sulfate HFA (PROVENTIL;VENTOLIN;PROAIR) 108 (90 Base) MCG/ACT inhaler, inhale 2 puffs by mouth every 4 hours if needed for wheezing, Disp: 8.5 g, Rfl: 5    tiZANidine (ZANAFLEX) 4 MG tablet, Take 4 mg by mouth every 6 hours as needed, Disp: , Rfl:     escitalopram (LEXAPRO) 20 MG tablet, TAKE ONE TABLET BY MOUTH DAILY, Disp: 90 tablet, Rfl: 1    umeclidinium-vilanterol (ANORO ELLIPTA) 62.5-25 MCG/INH AEPB inhaler, Inhale 1 puff into the lungs daily, Disp: 28 each, Rfl: 0    Budeson-Glycopyrrol-Formoterol (BREZTRI AEROSPHERE) 160-9-4.8 MCG/ACT AERO, Inhale 2 each into the lungs 2 times daily, Disp: 2 Inhaler, Rfl: 0    montelukast (SINGULAIR) 10 MG tablet, Take 1 tablet by mouth daily (Patient not taking: No sig reported), Disp: 30 tablet, Rfl: 2    atorvastatin (LIPITOR) 10 MG tablet, Take 1 tablet by mouth daily, Disp: 30 tablet, Rfl: 2    mometasone (NASONEX) 50 MCG/ACT nasal spray, 2 sprays by Nasal route daily (Patient not taking: No sig reported), Disp: 1 Inhaler, Rfl: 3      ASSESSMENT PLAN:   Treatment setup and plan reviewed. Port images/CBCT images reviewed. Appropriate laboratory work was reviewed. Treatment side effects and toxicities reviewed with the patient, and appropriate management was advised. Will continue radiation treatment as planned, and recommend patient contact us if they have any questions or concerns. Recommend patient continue monitoring his bowel symptoms and Imodium.   Recommend diet modification to help with symptoms    Electronically signed by Petra Currie MD on 1/25/2023 at 2:47 PM      Drugs Prescribed:  New Prescriptions    No medications on file       Other Orders Placed:  No orders of the defined types were placed in this encounter.

## 2023-01-26 ENCOUNTER — APPOINTMENT (OUTPATIENT)
Dept: RADIATION ONCOLOGY | Age: 68
End: 2023-01-26
Payer: MEDICARE

## 2023-01-26 PROCEDURE — 77385 HC NTSTY MODUL RAD TX DLVR SMPL: CPT | Performed by: RADIOLOGY

## 2023-01-27 ENCOUNTER — APPOINTMENT (OUTPATIENT)
Dept: RADIATION ONCOLOGY | Age: 68
End: 2023-01-27
Payer: MEDICARE

## 2023-01-27 PROCEDURE — 77385 HC NTSTY MODUL RAD TX DLVR SMPL: CPT | Performed by: RADIOLOGY

## 2023-01-30 ENCOUNTER — APPOINTMENT (OUTPATIENT)
Dept: RADIATION ONCOLOGY | Age: 68
End: 2023-01-30
Payer: MEDICARE

## 2023-01-30 PROCEDURE — 77385 HC NTSTY MODUL RAD TX DLVR SMPL: CPT | Performed by: RADIOLOGY

## 2023-01-31 ENCOUNTER — APPOINTMENT (OUTPATIENT)
Dept: RADIATION ONCOLOGY | Age: 68
End: 2023-01-31
Payer: MEDICARE

## 2023-01-31 PROCEDURE — 77385 HC NTSTY MODUL RAD TX DLVR SMPL: CPT | Performed by: RADIOLOGY

## 2023-02-01 ENCOUNTER — HOSPITAL ENCOUNTER (OUTPATIENT)
Dept: RADIATION ONCOLOGY | Age: 68
Discharge: HOME | End: 2023-02-01
Payer: MEDICARE

## 2023-02-01 VITALS
BODY MASS INDEX: 26.18 KG/M2 | SYSTOLIC BLOOD PRESSURE: 178 MMHG | OXYGEN SATURATION: 97 % | DIASTOLIC BLOOD PRESSURE: 80 MMHG | RESPIRATION RATE: 16 BRPM | WEIGHT: 172.2 LBS | HEART RATE: 61 BPM | TEMPERATURE: 98 F

## 2023-02-01 DIAGNOSIS — C61 MALIGNANT NEOPLASM OF PROSTATE (HCC): ICD-10-CM

## 2023-02-01 LAB
RAD ONC MSQ ACTUAL FRACTIONS DELIVERED: 11
RAD ONC MSQ ACTUAL SESSION DELIVERED DOSE: 180 CGRAY
RAD ONC MSQ ACTUAL TOTAL DOSE: 1980 CGRAY
RAD ONC MSQ ELAPSED DAYS: 15
RAD ONC MSQ LAST DATE: NORMAL
RAD ONC MSQ PRESCRIBED FRACTIONAL DOSE: 180 CGRAY
RAD ONC MSQ PRESCRIBED NUMBER OF FRACTIONS: 14
RAD ONC MSQ PRESCRIBED TECHNIQUE: NORMAL
RAD ONC MSQ PRESCRIBED TOTAL DOSE: 2520 CGRAY
RAD ONC MSQ START DATE: NORMAL
RAD ONC MSQ TREATMENT COURSE NUMBER: 1
RAD ONC MSQ TREATMENT SITE: NORMAL

## 2023-02-01 PROCEDURE — 77385 HC NTSTY MODUL RAD TX DLVR SMPL: CPT | Performed by: RADIOLOGY

## 2023-02-01 PROCEDURE — 77336 RADIATION PHYSICS CONSULT: CPT | Performed by: RADIOLOGY

## 2023-02-01 NOTE — PROGRESS NOTES
[address]       Radiation Oncology          [address] [facility], [address]        Renu Reis: [phone]        F: [phone]       [address]       Radiation Oncology   [address] [facility], [address] [facility]       Renu Reis: [phone]       F: [phone]       [email or web address]          RADIATION ONCOLOGY WEEKLY PROGRESS NOTE  Patient ID:   Hector Molina  : 1955   MRN: [id number]    Location:  [facility] Radiation Oncology,   [address] [facility], [facility]   [phone]     DIAGNOSIS:  Cancer Staging  History of prostate cancer  Staging form: Prostate, AJCC 7th Edition  - Clinical stage from 2016: Stage Unknown (T1c, NX, M0, PSA: Less than 10, [place] 7) - Signed by Patricia Anguiano MD on 2/3/2017  - Pathologic stage from 2017: Stage III (T3a, N0, cM0, [place] 7) - Signed by Patricia Anguiano MD on 2/3/2017      TREATMENT DETAILS:  Treatment Site: Prostate bed and LNs  Actual Dose: 6480 cGy  Total Planned Dose: 7020 cGy  Treatment Technique: IMRT  Fraction Technique: Daily  Therapy imaging monitoring: CBCT daily  Concurrent Chemotherapy: None    SUBJECTIVE:   Patient seen for their weekly on treatment evaluation today. Feeling well, though he has more urinary symptoms of urgency, though denies any dysuria. He he does have soft stool but is using Imodium daily. He denies any fatigue or changes in appetite.     OBJECTIVE:   CHAPERONE: Not Required    ECO Asymptomatic    VITAL SIGNS: BP (!) 178/80   Pulse 61   Temp 98 °F (36.7 °C) (Temporal)   Resp 16   Wt 172 lb 3.2 oz (78.1 kg)   SpO2 97%   BMI 26.18 kg/m²   Wt Readings from Last 5 Encounters:   23 172 lb 3.2 oz (78.1 kg)   23 175 lb (79.4 kg)   23 175 lb 3.2 oz (79.5 kg)   23 176 lb (79.8 kg)   23 176 lb 6.4 oz (80 kg)     GENERAL:  General appearance is that of a well-nourished, well-developed in no apparent distress. LUNGS:  Pulmonary effort normal. Normal breath sounds. ABDOMEN:  Soft, nontender, non distended. EXTREMITIES:  No edema. Normal ROM. NEUROLOGICAL: Alert and oriented. Strength and sensation intact bilaterally. No focal deficits. PSYCH: Mood normal, behavior normal.  SKIN: No erythema, no desquamation. LABS:  WBC   Date Value Ref Range Status   10/14/2022 9.1 3.5 - 11.3 k/uL Final   11/19/2019 5.1 3.5 - 11.3 k/uL Final   02/14/2018 9.0 3.5 - 11.0 k/uL Final     Segs Absolute   Date Value Ref Range Status   10/14/2022 6.39 1.50 - 8.10 k/uL Final   11/19/2019 2.44 1.8 - 7.7 k/uL Final   02/14/2018 5.90 1.8 - 7.7 k/uL Final     Hemoglobin   Date Value Ref Range Status   10/14/2022 15.5 13.0 - 17.0 g/dL Final   11/19/2019 15.0 13.0 - 17.0 g/dL Final   02/14/2018 16.4 13.5 - 17.5 g/dL Final     Platelets   Date Value Ref Range Status   10/14/2022 207 138 - 453 k/uL Final   11/19/2019 182 138 - 453 k/uL Final   02/14/2018 217 130 - 400 k/uL Final     Creatinine   Date Value Ref Range Status   10/14/2022 0.89 0.70 - 1.20 mg/dL Final   11/19/2019 0.74 0.70 - 1.20 mg/dL Final   02/14/2018 0.88 0.70 - 1.20 mg/dL Final     No results found for: , CEA  PSA   Date Value Ref Range Status   11/08/2022 0.13 <4.1 ng/mL Final     Comment:     The Roche \"ECLIA\" assay is used. Results obtained with different assay methods cannot be   used interchangeably. 10/14/2022 0.15 <4.1 ng/mL Final     Comment:     The Roche \"ECLIA\" assay is used. Results obtained with different assay methods cannot be   used interchangeably. 11/19/2019 0.03 <4.1 ug/L Final     Comment:     The Roche \"ECLIA\" assay is used. Results obtained with different assay methods cannot be   used interchangeably. 02/14/2018 0.01 <4.1 ug/L Final     Comment:     The Roche \"ECLIA\" assay is used. Results obtained with different assay methods   cannot be used interchangeably.   Performed at [facility] [address] [facility], [address] [facility] [phone]     08/11/2016 4.41 (H) <4.1 ug/L Final     Comment:     The Roche \"ECLIA\" assay is used. Results obtained with different assay methods   cannot be used interchangeably. Performed at [address] [facility], [address] [place] [address] [phone]         MEDICATIONS:    Current Outpatient Medications:     losartan (COZAAR) 50 MG tablet, Take 1 tablet by mouth daily, Disp: 30 tablet, Rfl: 2    albuterol sulfate HFA (PROVENTIL;VENTOLIN;PROAIR) 108 (90 Base) MCG/ACT inhaler, inhale 2 puffs by mouth every 4 hours if needed for wheezing, Disp: 8.5 g, Rfl: 5    tiZANidine (ZANAFLEX) 4 MG tablet, Take 4 mg by mouth every 6 hours as needed, Disp: , Rfl:     escitalopram (LEXAPRO) 20 MG tablet, TAKE ONE TABLET BY MOUTH DAILY, Disp: 90 tablet, Rfl: 1    umeclidinium-vilanterol (ANORO ELLIPTA) 62.5-25 MCG/INH AEPB inhaler, Inhale 1 puff into the lungs daily, Disp: 28 each, Rfl: 0    Budeson-Glycopyrrol-Formoterol (BREZTRI AEROSPHERE) 160-9-4.8 MCG/ACT AERO, Inhale 2 each into the lungs 2 times daily, Disp: 2 Inhaler, Rfl: 0    montelukast (SINGULAIR) 10 MG tablet, Take 1 tablet by mouth daily (Patient not taking: No sig reported), Disp: 30 tablet, Rfl: 2    atorvastatin (LIPITOR) 10 MG tablet, Take 1 tablet by mouth daily, Disp: 30 tablet, Rfl: 2    mometasone (NASONEX) 50 MCG/ACT nasal spray, 2 sprays by Nasal route daily (Patient not taking: No sig reported), Disp: 1 Inhaler, Rfl: 3      ASSESSMENT PLAN:   Treatment setup and plan reviewed. Port images/CBCT images reviewed. Appropriate laboratory work was reviewed. Treatment side effects and toxicities reviewed with the patient, and appropriate management was advised. Will continue radiation treatment as planned, and recommend patient contact us if they have any questions or concerns. Recommend patient continue monitoring his bowel symptoms and using Imodium.   Patient is recommended to monitor his urinary symptoms. Electronically signed by Jose Luis Bryant MD on 2/1/2023 at 2:11 PM      Drugs Prescribed:  New Prescriptions    No medications on file       Other Orders Placed:  No orders of the defined types were placed in this encounter.

## 2023-02-01 NOTE — PROGRESS NOTES
Sukh Piña  2/1/2023  Wt Readings from Last 3 Encounters:   02/01/23 172 lb 3.2 oz (78.1 kg)   01/25/23 175 lb (79.4 kg)   01/18/23 175 lb 3.2 oz (79.5 kg)     Body mass index is 26.18 kg/m².        Treatment Area:prostate    Patient was seen today for weekly visit.     Comfort Alteration    Fatigue: None    Nutritional Alteration  Anorexia: No  Nausea: No  Vomiting: No     Elimination Alterations  Constipation: no  Diarrhea:  no- stool soft, using imodium  Urinary Frequency/Urgency: Yes  Urinary Retention: No  Dysuria: No  Urinary Incontinence: No  Proctitis: No  Nocturia: Yes #/night: 3     Emotional  Coping: effective      Skin Alteration   Sensation:intact    Radiation Dermatitis:  Intact [x]     Erythema  []     Discoloration  []     Rash []     Dry desquamation  []     Moist desquamation []           Injury, potential bleeding or infection: none    Lab Results   Component Value Date    WBC 9.1 10/14/2022     10/14/2022         BP (!) 178/80   Pulse 61   Temp 98 °F (36.7 °C) (Temporal)   Resp 16   Wt 172 lb 3.2 oz (78.1 kg)   SpO2 97%   BMI 26.18 kg/m²      Pain Assessment: None - Denies Pain            Assessment/Plan: Patient was seen today for weekly visit.  Doing well.  Urinary frequency without pain or burning.  Dr. Perez examined patient.  Continue current treatment plan.    Sania Simon RN

## 2023-02-02 ENCOUNTER — HOSPITAL ENCOUNTER (OUTPATIENT)
Dept: RADIATION ONCOLOGY | Age: 68
Discharge: HOME | End: 2023-02-02
Payer: MEDICARE

## 2023-02-02 DIAGNOSIS — C61 MALIGNANT NEOPLASM OF PROSTATE (HCC): ICD-10-CM

## 2023-02-02 LAB
RAD ONC MSQ ACTUAL FRACTIONS DELIVERED: 12
RAD ONC MSQ ACTUAL SESSION DELIVERED DOSE: 180 CGRAY
RAD ONC MSQ ACTUAL TOTAL DOSE: 2160 CGRAY
RAD ONC MSQ ELAPSED DAYS: 16
RAD ONC MSQ LAST DATE: NORMAL
RAD ONC MSQ PRESCRIBED FRACTIONAL DOSE: 180 CGRAY
RAD ONC MSQ PRESCRIBED NUMBER OF FRACTIONS: 14
RAD ONC MSQ PRESCRIBED TECHNIQUE: NORMAL
RAD ONC MSQ PRESCRIBED TOTAL DOSE: 2520 CGRAY
RAD ONC MSQ START DATE: NORMAL
RAD ONC MSQ TREATMENT COURSE NUMBER: 1
RAD ONC MSQ TREATMENT SITE: NORMAL

## 2023-02-02 PROCEDURE — 77385 HC NTSTY MODUL RAD TX DLVR SMPL: CPT | Performed by: RADIOLOGY

## 2023-02-03 ENCOUNTER — HOSPITAL ENCOUNTER (OUTPATIENT)
Dept: RADIATION ONCOLOGY | Age: 68
Discharge: HOME | End: 2023-02-03
Payer: MEDICARE

## 2023-02-03 DIAGNOSIS — C61 MALIGNANT NEOPLASM OF PROSTATE (HCC): ICD-10-CM

## 2023-02-03 LAB
RAD ONC MSQ ACTUAL FRACTIONS DELIVERED: 13
RAD ONC MSQ ACTUAL SESSION DELIVERED DOSE: 180 CGRAY
RAD ONC MSQ ACTUAL TOTAL DOSE: 2340 CGRAY
RAD ONC MSQ ELAPSED DAYS: 17
RAD ONC MSQ LAST DATE: NORMAL
RAD ONC MSQ PRESCRIBED FRACTIONAL DOSE: 180 CGRAY
RAD ONC MSQ PRESCRIBED NUMBER OF FRACTIONS: 14
RAD ONC MSQ PRESCRIBED TECHNIQUE: NORMAL
RAD ONC MSQ PRESCRIBED TOTAL DOSE: 2520 CGRAY
RAD ONC MSQ START DATE: NORMAL
RAD ONC MSQ TREATMENT COURSE NUMBER: 1
RAD ONC MSQ TREATMENT SITE: NORMAL

## 2023-02-03 PROCEDURE — 77385 HC NTSTY MODUL RAD TX DLVR SMPL: CPT | Performed by: RADIOLOGY

## 2023-02-06 ENCOUNTER — APPOINTMENT (OUTPATIENT)
Dept: RADIATION ONCOLOGY | Age: 68
End: 2023-02-06
Payer: MEDICARE

## 2023-02-06 DIAGNOSIS — C61 MALIGNANT NEOPLASM OF PROSTATE (HCC): ICD-10-CM

## 2023-02-06 DIAGNOSIS — Z85.46 HISTORY OF PROSTATE CANCER: Primary | ICD-10-CM

## 2023-02-06 LAB
RAD ONC MSQ ACTUAL FRACTIONS DELIVERED: 14
RAD ONC MSQ ACTUAL SESSION DELIVERED DOSE: 180 CGRAY
RAD ONC MSQ ACTUAL TOTAL DOSE: 2520 CGRAY
RAD ONC MSQ ELAPSED DAYS: 20
RAD ONC MSQ LAST DATE: NORMAL
RAD ONC MSQ PRESCRIBED FRACTIONAL DOSE: 180 CGRAY
RAD ONC MSQ PRESCRIBED NUMBER OF FRACTIONS: 14
RAD ONC MSQ PRESCRIBED TECHNIQUE: NORMAL
RAD ONC MSQ PRESCRIBED TOTAL DOSE: 2520 CGRAY
RAD ONC MSQ START DATE: NORMAL
RAD ONC MSQ TREATMENT COURSE NUMBER: 1
RAD ONC MSQ TREATMENT SITE: NORMAL

## 2023-02-06 PROCEDURE — 77385 HC NTSTY MODUL RAD TX DLVR SMPL: CPT | Performed by: RADIOLOGY

## 2023-02-28 ENCOUNTER — OFFICE VISIT (OUTPATIENT)
Dept: UROLOGY | Age: 68
End: 2023-02-28
Payer: MEDICARE

## 2023-02-28 VITALS
DIASTOLIC BLOOD PRESSURE: 78 MMHG | BODY MASS INDEX: 26.07 KG/M2 | HEART RATE: 78 BPM | WEIGHT: 172 LBS | SYSTOLIC BLOOD PRESSURE: 127 MMHG | HEIGHT: 68 IN

## 2023-02-28 DIAGNOSIS — R97.21 RISING PSA FOLLOWING TREATMENT FOR MALIGNANT NEOPLASM OF PROSTATE: Primary | ICD-10-CM

## 2023-02-28 DIAGNOSIS — N52.31 ERECTILE DYSFUNCTION AFTER RADICAL PROSTATECTOMY: ICD-10-CM

## 2023-02-28 PROCEDURE — 1036F TOBACCO NON-USER: CPT | Performed by: UROLOGY

## 2023-02-28 PROCEDURE — 1123F ACP DISCUSS/DSCN MKR DOCD: CPT | Performed by: UROLOGY

## 2023-02-28 PROCEDURE — G8484 FLU IMMUNIZE NO ADMIN: HCPCS | Performed by: UROLOGY

## 2023-02-28 PROCEDURE — 3074F SYST BP LT 130 MM HG: CPT | Performed by: UROLOGY

## 2023-02-28 PROCEDURE — G8427 DOCREV CUR MEDS BY ELIG CLIN: HCPCS | Performed by: UROLOGY

## 2023-02-28 PROCEDURE — 3017F COLORECTAL CA SCREEN DOC REV: CPT | Performed by: UROLOGY

## 2023-02-28 PROCEDURE — G8417 CALC BMI ABV UP PARAM F/U: HCPCS | Performed by: UROLOGY

## 2023-02-28 PROCEDURE — 3078F DIAST BP <80 MM HG: CPT | Performed by: UROLOGY

## 2023-02-28 PROCEDURE — 99214 OFFICE O/P EST MOD 30 MIN: CPT | Performed by: UROLOGY

## 2023-02-28 ASSESSMENT — ENCOUNTER SYMPTOMS
EYES NEGATIVE: 1
EYE PAIN: 0
CONSTIPATION: 0
GASTROINTESTINAL NEGATIVE: 1
RESPIRATORY NEGATIVE: 1
BACK PAIN: 0
ABDOMINAL PAIN: 0
WHEEZING: 0
DIARRHEA: 0
NAUSEA: 0
VOMITING: 0
EYE REDNESS: 0
COUGH: 0
SHORTNESS OF BREATH: 0

## 2023-02-28 NOTE — PROGRESS NOTES
[address] [address]  Dept: [address] Urology Office Note - Established    Patient:  Sharad Cage  YOB: 1955  Date: 2/28/2023    The patient is a 76 y.o. male who presents todayfor evaluation of the following problems:   Chief Complaint   Patient presents with    Follow-up     4 month follow up       HPI  Here for prostate cancer. He had a RALP about 5 years ago. He developed a BCR  He just finished XRT. No hematuria or dysuria. He has had minimal voiding complaints. He is interested in ED. We had discussed trimix. Summary of old records: N/A    Additional History: N/A    Procedures Today: N/A    Urinalysis today:  No results found for this visit on 02/28/23. Last several PSA's:  Lab Results   Component Value Date    PSA 0.13 11/08/2022    PSA 0.15 10/14/2022    PSA 0.03 11/19/2019     Last total testosterone:  Lab Results   Component Value Date    TESTOSTERONE 376 10/14/2022       AUA Symptom Score (2/28/2023): Last BUN and creatinine:  Lab Results   Component Value Date    BUN 19 10/14/2022     Lab Results   Component Value Date    CREATININE 0.89 10/14/2022       Additional Lab/Culture results: none    Imaging Reviewed during this Office Visit: none  (results were independently reviewed by physician and radiology report verified)    PAST MEDICAL, FAMILY AND SOCIAL HISTORY UPDATE:  Past Medical History:   Diagnosis Date    Allergic rhinitis     Asthma     On Inhaler    Depression     Dyslipidemia     Erectile dysfunction     Hepatitis C     Hx of rotator cuff surgery     Rt.      Hypertension     No Meds     Past Surgical History:   Procedure Laterality Date    COLONOSCOPY      COLONOSCOPY  02/20/2019    polypectomy    COLONOSCOPY N/A 2/20/2019    COLONOSCOPY POLYPECTOMY SNARE/COLD BIOPSY performed by Murtaza Mcclain MD at [address] [place] [address] PROSTATECTOMY  2017    robotic radical prostatectomy with pelvic node dissection    ROTATOR CUFF REPAIR Right  ? Family History   Problem Relation Age of Onset    Lung Cancer Mother     Heart Attack Father     Cancer Father         Throat     Outpatient Medications Marked as Taking for the 23 encounter (Office Visit) with Chris León MD   Medication Sig Dispense Refill    losartan (COZAAR) 50 MG tablet Take 1 tablet by mouth daily 30 tablet 2    albuterol sulfate HFA (PROVENTIL;VENTOLIN;PROAIR) 108 (90 Base) MCG/ACT inhaler inhale 2 puffs by mouth every 4 hours if needed for wheezing 8.5 g 5    escitalopram (LEXAPRO) 20 MG tablet TAKE ONE TABLET BY MOUTH DAILY 90 tablet 1    atorvastatin (LIPITOR) 10 MG tablet Take 1 tablet by mouth daily 30 tablet 2       Pcn [penicillins]  Social History     Tobacco Use   Smoking Status Former    Packs/day: 1.00    Years: 40.00    Pack years: 40.00    Types: Cigarettes    Start date: 1971    Quit date: 2018    Years since quittin.3   Smokeless Tobacco Former   Tobacco Comments    Zyn nicotine containing pouches     (Ifpatient a smoker, smoking cessation counseling offered)    Social History     Substance and Sexual Activity   Alcohol Use Yes    Alcohol/week: 7.0 standard drinks    Types: 7 Standard drinks or equivalent per week       REVIEW OF SYSTEMS:  Review of Systems    Physical Exam:      Vitals:    23 1330   BP: 127/78   Pulse: 78     Body mass index is 26.15 kg/m². Patient is a 76 y.o. male in no acute distress and alert and oriented to person, place and time. Physical Exam  Constitutional: Patient in no acute distress. Neuro: Alert and oriented to person, place and time. Psych: Mood normal, affect normal  Skin: No rash noted    Assessment and Plan      1. Rising PSA following treatment for malignant neoplasm of prostate    2.  Erectile dysfunction after radical prostatectomy           Plan:         Doing well post XRT for biochemical recurrence. Minimal voiding complaints. PSA ordered for 3 and 6 months. Will start trimix. Discussed risks and benefits. Will arrange a teaching session. Return in about 6 months (around 8/28/2023) for Labs. Prescriptions Ordered:  No orders of the defined types were placed in this encounter. Orders Placed:  Orders Placed This Encounter   Procedures    PSA, Diagnostic     Standing Status:   Future     Standing Expiration Date:   2/28/2024    PSA, Diagnostic     Standing Status:   Future     Standing Expiration Date:   2/28/2024             Giana Macias MD    Agree with the ROS entered by the MA.

## 2023-02-28 NOTE — LETTER
[address] [facility]  [address] [facility] [address]  Dept: [phone]  Dept Fax: [phone]        2/28/23    Patient: Salinas Nathan  YOB: 1955    Dear Delight Sandhoff, DO,    I had the pleasure of seeing one of your patients, Marita Gone today in the office today. Below are the relevant portions of my assessment and plan of care. IMPRESSION:  1. Rising PSA following treatment for malignant neoplasm of prostate        PLAN:  Doing well post XRT for biochemical recurrence. Minimal voiding complaints. PSA ordered for 3 and 6 months. Return in about 6 months (around 8/28/2023) for Labs. Prescriptions Ordered:  No orders of the defined types were placed in this encounter. Orders Placed:  Orders Placed This Encounter   Procedures    PSA, Diagnostic     Standing Status:   Future     Standing Expiration Date:   2/28/2024    PSA, Diagnostic     Standing Status:   Future     Standing Expiration Date:   2/28/2024          Thank you for allowing me to participate in the care of this patient. I will keep you updated on this patient's follow up and I look forward to serving you and your patients again in the future.         Cuate Nielsen MD

## 2023-03-03 DIAGNOSIS — J45.20 MILD INTERMITTENT ASTHMA WITHOUT COMPLICATION: ICD-10-CM

## 2023-03-06 RX ORDER — ALBUTEROL SULFATE 90 UG/1
AEROSOL, METERED RESPIRATORY (INHALATION)
Qty: 8.5 G | Refills: 5 | Status: SHIPPED | OUTPATIENT
Start: 2023-03-06 | End: 2023-03-07 | Stop reason: SDUPTHER

## 2023-03-06 NOTE — TELEPHONE ENCOUNTER
[facility] is calling to request a refill on the following medication(s):    Medication Request:  Requested Prescriptions     Pending Prescriptions Disp Refills    albuterol sulfate HFA (PROVENTIL;VENTOLIN;PROAIR) 108 (90 Base) MCG/ACT inhaler [Pharmacy Med Name: ALBUTEROL HFA 90 MCG INHALER] 8.5 g 5     Sig: inhale 2 puffs by mouth every 4 hours if needed for wheezing       Last Visit Date (If Applicable):  51/0/7390    Next Visit Date:    4/7/2023

## 2023-03-07 DIAGNOSIS — J45.20 MILD INTERMITTENT ASTHMA WITHOUT COMPLICATION: ICD-10-CM

## 2023-03-07 RX ORDER — ALBUTEROL SULFATE 90 UG/1
AEROSOL, METERED RESPIRATORY (INHALATION)
Qty: 8.5 G | Refills: 5 | Status: SHIPPED | OUTPATIENT
Start: 2023-03-07

## 2023-03-07 NOTE — TELEPHONE ENCOUNTER
[facility] is calling to request a refill on the following medication(s):    Medication Request:  Requested Prescriptions     Pending Prescriptions Disp Refills    albuterol sulfate HFA (PROVENTIL;VENTOLIN;PROAIR) 108 (90 Base) MCG/ACT inhaler 8.5 g 5       Last Visit Date (If Applicable):  57/1/7525    Next Visit Date:    4/7/2023

## 2023-03-13 ENCOUNTER — TELEPHONE (OUTPATIENT)
Dept: FAMILY MEDICINE CLINIC | Age: 68
End: 2023-03-13

## 2023-04-07 ENCOUNTER — OFFICE VISIT (OUTPATIENT)
Dept: FAMILY MEDICINE CLINIC | Age: 68
End: 2023-04-07

## 2023-04-07 VITALS
WEIGHT: 177 LBS | SYSTOLIC BLOOD PRESSURE: 130 MMHG | HEART RATE: 75 BPM | BODY MASS INDEX: 26.91 KG/M2 | DIASTOLIC BLOOD PRESSURE: 80 MMHG | OXYGEN SATURATION: 96 %

## 2023-04-07 DIAGNOSIS — F33.0 MAJOR DEPRESSIVE DISORDER, RECURRENT, MILD (HCC): ICD-10-CM

## 2023-04-07 DIAGNOSIS — J44.9 CHRONIC OBSTRUCTIVE PULMONARY DISEASE, UNSPECIFIED COPD TYPE (HCC): Primary | ICD-10-CM

## 2023-04-07 DIAGNOSIS — I10 PRIMARY HYPERTENSION: ICD-10-CM

## 2023-04-07 DIAGNOSIS — E78.5 DYSLIPIDEMIA: ICD-10-CM

## 2023-04-07 DIAGNOSIS — Z85.46 HISTORY OF PROSTATE CANCER: ICD-10-CM

## 2023-04-07 DIAGNOSIS — R06.02 SOBOE (SHORTNESS OF BREATH ON EXERTION): ICD-10-CM

## 2023-04-07 RX ORDER — BUDESONIDE, GLYCOPYRROLATE, AND FORMOTEROL FUMARATE 160; 9; 4.8 UG/1; UG/1; UG/1
2 AEROSOL, METERED RESPIRATORY (INHALATION) 2 TIMES DAILY
Qty: 2 EACH | Refills: 3 | Status: SHIPPED | OUTPATIENT
Start: 2023-04-07 | End: 2023-04-07 | Stop reason: SDUPTHER

## 2023-04-07 RX ORDER — BUDESONIDE, GLYCOPYRROLATE, AND FORMOTEROL FUMARATE 160; 9; 4.8 UG/1; UG/1; UG/1
2 AEROSOL, METERED RESPIRATORY (INHALATION) 2 TIMES DAILY
Qty: 1 EACH | Refills: 0 | COMMUNITY
Start: 2023-04-07

## 2023-04-07 SDOH — ECONOMIC STABILITY: FOOD INSECURITY: WITHIN THE PAST 12 MONTHS, THE FOOD YOU BOUGHT JUST DIDN'T LAST AND YOU DIDN'T HAVE MONEY TO GET MORE.: NEVER TRUE

## 2023-04-07 SDOH — ECONOMIC STABILITY: INCOME INSECURITY: HOW HARD IS IT FOR YOU TO PAY FOR THE VERY BASICS LIKE FOOD, HOUSING, MEDICAL CARE, AND HEATING?: NOT HARD AT ALL

## 2023-04-07 SDOH — ECONOMIC STABILITY: HOUSING INSECURITY
IN THE LAST 12 MONTHS, WAS THERE A TIME WHEN YOU DID NOT HAVE A STEADY PLACE TO SLEEP OR SLEPT IN A SHELTER (INCLUDING NOW)?: NO

## 2023-04-07 SDOH — ECONOMIC STABILITY: FOOD INSECURITY: WITHIN THE PAST 12 MONTHS, YOU WORRIED THAT YOUR FOOD WOULD RUN OUT BEFORE YOU GOT MONEY TO BUY MORE.: NEVER TRUE

## 2023-04-07 ASSESSMENT — ENCOUNTER SYMPTOMS
HYPERVENTILATION: 0
SWOLLEN GLANDS: 0
CHOKING: 0
BLURRED VISION: 0
SORE THROAT: 0
HEMOPTYSIS: 0
SPUTUM PRODUCTION: 0
RHINORRHEA: 0
ORTHOPNEA: 1
WHEEZING: 1
GASTROINTESTINAL NEGATIVE: 1
ABDOMINAL PAIN: 0
SHORTNESS OF BREATH: 1
ALLERGIC/IMMUNOLOGIC NEGATIVE: 1
VOMITING: 0
EYES NEGATIVE: 1

## 2023-04-07 ASSESSMENT — PATIENT HEALTH QUESTIONNAIRE - PHQ9
SUM OF ALL RESPONSES TO PHQ QUESTIONS 1-9: 0
6. FEELING BAD ABOUT YOURSELF - OR THAT YOU ARE A FAILURE OR HAVE LET YOURSELF OR YOUR FAMILY DOWN: 0
9. THOUGHTS THAT YOU WOULD BE BETTER OFF DEAD, OR OF HURTING YOURSELF: 0
2. FEELING DOWN, DEPRESSED OR HOPELESS: 0
10. IF YOU CHECKED OFF ANY PROBLEMS, HOW DIFFICULT HAVE THESE PROBLEMS MADE IT FOR YOU TO DO YOUR WORK, TAKE CARE OF THINGS AT HOME, OR GET ALONG WITH OTHER PEOPLE: 0
8. MOVING OR SPEAKING SO SLOWLY THAT OTHER PEOPLE COULD HAVE NOTICED. OR THE OPPOSITE, BEING SO FIGETY OR RESTLESS THAT YOU HAVE BEEN MOVING AROUND A LOT MORE THAN USUAL: 0
SUM OF ALL RESPONSES TO PHQ QUESTIONS 1-9: 0
SUM OF ALL RESPONSES TO PHQ QUESTIONS 1-9: 0
3. TROUBLE FALLING OR STAYING ASLEEP: 0
1. LITTLE INTEREST OR PLEASURE IN DOING THINGS: 0
4. FEELING TIRED OR HAVING LITTLE ENERGY: 0
7. TROUBLE CONCENTRATING ON THINGS, SUCH AS READING THE NEWSPAPER OR WATCHING TELEVISION: 0
SUM OF ALL RESPONSES TO PHQ9 QUESTIONS 1 & 2: 0
SUM OF ALL RESPONSES TO PHQ QUESTIONS 1-9: 0
5. POOR APPETITE OR OVEREATING: 0

## 2023-04-07 ASSESSMENT — COPD QUESTIONNAIRES: COPD: 1

## 2023-04-07 NOTE — PROGRESS NOTES
symptoms include shortness of breath. Pertinent negatives include no chest pain, focal sensory loss, focal weakness or leg pain. Current antihyperlipidemic treatment includes statins. Depression  Visit Type: follow-up  Patient presents with the following symptoms: depressed mood and shortness of breath. Patient is not experiencing: anhedonia, chest pain, choking sensation, compulsions, confusion, decreased concentration, dizziness, dry mouth, excessive worry, fatigue, feelings of hopelessness, feelings of worthlessness, hypersomnia, hyperventilation, impotence, insomnia, irritability, malaise, memory impairment, muscle tension, nausea, nervousness/anxiety, obsessions, palpitations, panic, psychomotor agitation, psychomotor retardation, restlessness, suicidal ideas, suicidal planning, thoughts of death, weight gain and weight loss. Frequency of symptoms: most days   Severity: mild   Nighttime awakenings: occasional  Compliance with medications:  %      Review of Systems   Review of Systems   Constitutional: Negative. Negative for fever, irritability, weight gain and weight loss. HENT: Negative. Negative for ear pain, rhinorrhea and sore throat. Eyes: Negative. Negative for blurred vision. Respiratory:  Positive for shortness of breath and wheezing. Negative for hemoptysis, sputum production and choking. Cardiovascular:  Positive for orthopnea. Negative for chest pain, palpitations, claudication, leg swelling, syncope and PND. Gastrointestinal: Negative. Negative for abdominal pain and vomiting. Endocrine: Negative. Genitourinary: Negative. Negative for impotence. Musculoskeletal: Negative. Negative for neck pain. Skin: Negative. Negative for rash. Allergic/Immunologic: Negative. Neurological: Negative. Negative for focal weakness and headaches. Hematological: Negative. Psychiatric/Behavioral:  Positive for depression.  Negative for confusion, decreased concentration and

## 2023-04-07 NOTE — ASSESSMENT & PLAN NOTE
Uncontrolled, changes made today: given Breztri sample and sent script to pharmacy and medication adherence emphasized   Reviewed last CXR and spirometry

## 2023-05-19 ENCOUNTER — HOSPITAL ENCOUNTER (OUTPATIENT)
Age: 68
Discharge: HOME | End: 2023-05-19
Payer: MEDICARE

## 2023-05-19 DIAGNOSIS — R97.21 RISING PSA FOLLOWING TREATMENT FOR MALIGNANT NEOPLASM OF PROSTATE: ICD-10-CM

## 2023-05-19 DIAGNOSIS — R06.02 SOBOE (SHORTNESS OF BREATH ON EXERTION): ICD-10-CM

## 2023-05-19 LAB
ALBUMIN SERPL-MCNC: 4.6 G/DL (ref 3.5–5.2)
ALBUMIN/GLOB SERPL: 1.4 {RATIO} (ref 1–2.5)
ALP SERPL-CCNC: 75 U/L (ref 40–129)
ALT SERPL-CCNC: 11 U/L (ref 5–41)
ANION GAP SERPL CALCULATED.3IONS-SCNC: 12 MMOL/L (ref 9–17)
AST SERPL-CCNC: 18 U/L
BASOPHILS # BLD: <0.03 K/UL (ref 0–0.2)
BASOPHILS NFR BLD: 0 % (ref 0–2)
BILIRUB SERPL-MCNC: 0.5 MG/DL (ref 0.3–1.2)
BNP SERPL-MCNC: 45 PG/ML
BUN SERPL-MCNC: 17 MG/DL (ref 8–23)
CALCIUM SERPL-MCNC: 9.7 MG/DL (ref 8.6–10.4)
CHLORIDE SERPL-SCNC: 99 MMOL/L (ref 98–107)
CO2 SERPL-SCNC: 25 MMOL/L (ref 20–31)
CREAT SERPL-MCNC: 1 MG/DL (ref 0.7–1.2)
EOSINOPHIL # BLD: 0.21 K/UL (ref 0–0.44)
EOSINOPHILS RELATIVE PERCENT: 4 % (ref 1–4)
ERYTHROCYTE [DISTWIDTH] IN BLOOD BY AUTOMATED COUNT: 13.2 % (ref 11.8–14.4)
GFR SERPL CREATININE-BSD FRML MDRD: >60 ML/MIN/1.73M2
GLUCOSE SERPL-MCNC: 100 MG/DL (ref 70–99)
HCT VFR BLD AUTO: 44 % (ref 40.7–50.3)
HGB BLD-MCNC: 15.1 G/DL (ref 13–17)
IMM GRANULOCYTES # BLD AUTO: <0.03 K/UL (ref 0–0.3)
IMM GRANULOCYTES NFR BLD: 0 %
LYMPHOCYTES # BLD: 26 % (ref 24–43)
LYMPHOCYTES NFR BLD: 1.41 K/UL (ref 1.1–3.7)
MCH RBC QN AUTO: 36.8 PG (ref 25.2–33.5)
MCHC RBC AUTO-ENTMCNC: 34.3 G/DL (ref 28.4–34.8)
MCV RBC AUTO: 107.3 FL (ref 82.6–102.9)
MONOCYTES NFR BLD: 0.4 K/UL (ref 0.1–1.2)
MONOCYTES NFR BLD: 7 % (ref 3–12)
NEUTROPHILS NFR BLD: 63 % (ref 36–65)
NEUTS SEG NFR BLD: 3.38 K/UL (ref 1.5–8.1)
NRBC AUTOMATED: 0 PER 100 WBC
PLATELET # BLD AUTO: 178 K/UL (ref 138–453)
PMV BLD AUTO: 10.3 FL (ref 8.1–13.5)
POTASSIUM SERPL-SCNC: 4.4 MMOL/L (ref 3.7–5.3)
PROT SERPL-MCNC: 7.9 G/DL (ref 6.4–8.3)
PSA SERPL-MCNC: <0.02 NG/ML
RBC # BLD AUTO: 4.1 M/UL (ref 4.21–5.77)
RBC # BLD: ABNORMAL 10*6/UL
SODIUM SERPL-SCNC: 136 MMOL/L (ref 135–144)
WBC OTHER # BLD: 5.4 K/UL (ref 3.5–11.3)

## 2023-05-19 PROCEDURE — 84153 ASSAY OF PSA TOTAL: CPT

## 2023-05-19 PROCEDURE — 85025 COMPLETE CBC W/AUTO DIFF WBC: CPT

## 2023-05-19 PROCEDURE — 80053 COMPREHEN METABOLIC PANEL: CPT

## 2023-05-19 PROCEDURE — 83880 ASSAY OF NATRIURETIC PEPTIDE: CPT

## 2023-05-19 PROCEDURE — 36415 COLL VENOUS BLD VENIPUNCTURE: CPT

## 2023-05-21 DIAGNOSIS — D75.89 MACROCYTIC: Primary | ICD-10-CM

## 2023-05-25 ENCOUNTER — HOSPITAL ENCOUNTER (OUTPATIENT)
Dept: RADIATION ONCOLOGY | Age: 68
Discharge: HOME | End: 2023-05-25
Payer: MEDICARE

## 2023-05-25 VITALS
SYSTOLIC BLOOD PRESSURE: 155 MMHG | OXYGEN SATURATION: 98 % | HEART RATE: 69 BPM | DIASTOLIC BLOOD PRESSURE: 83 MMHG | WEIGHT: 177.6 LBS | BODY MASS INDEX: 27 KG/M2 | TEMPERATURE: 98.4 F | RESPIRATION RATE: 16 BRPM

## 2023-05-25 DIAGNOSIS — Z85.46 HISTORY OF PROSTATE CANCER: Primary | ICD-10-CM

## 2023-05-25 PROCEDURE — 99212 OFFICE O/P EST SF 10 MIN: CPT | Performed by: RADIOLOGY

## 2023-05-25 NOTE — PROGRESS NOTES
Charline Bills  5/25/2023  10:24 AM      Vitals:    05/25/23 1021   BP: (!) 155/83   Pulse: 69   Resp: 16   Temp: 98.4 °F (36.9 °C)   SpO2: 98%    :     Pain Assessment: None - Denies Pain          Wt Readings from Last 1 Encounters:   05/25/23 177 lb 9.6 oz (80.6 kg)                Current Outpatient Medications:     fluticasone-umeclidin-vilant (TRELEGY ELLIPTA) 100-62.5-25 MCG/ACT AEPB inhaler, Inhale 1 puff into the lungs daily, Disp: 3 each, Rfl: 1    Budeson-Glycopyrrol-Formoterol (BREZTRI AEROSPHERE) 160-9-4.8 MCG/ACT AERO, Inhale 2 each into the lungs 2 times daily, Disp: 1 each, Rfl: 0    albuterol sulfate HFA (PROVENTIL;VENTOLIN;PROAIR) 108 (90 Base) MCG/ACT inhaler, inhale 2 puffs by mouth every 4 hours if needed for wheezing, Disp: 8.5 g, Rfl: 5    losartan (COZAAR) 50 MG tablet, Take 1 tablet by mouth daily, Disp: 30 tablet, Rfl: 2    escitalopram (LEXAPRO) 20 MG tablet, TAKE ONE TABLET BY MOUTH DAILY, Disp: 90 tablet, Rfl: 1    Hormone:  Lupron []   Last dose given:           Next dose due:   Eligard []   Last dose given:           Next dose due:   Aromatase Inhibitors []   Medication name:   N/A:  [x]           FALLS RISK SCREEN  Instructions:  Assess the patient and enter the appropriate indicators that are present for fall risk identification. Total the numbers entered and assign a fall risk score from Table 2.  Reassess patient at a minimum every 12 weeks or with status change. Assessment   Date  5/25/2023     1. Mental Ability: confusion/cognitively impaired 0     2. Elimination Issues: incontinence, frequency 0       3. Ambulatory: use of assistive devices (walker, cane, off-loading devices),        attached to equipment (IV pole, oxygen) 0     4. Sensory Limitations: dizziness, vertigo, impaired vision 0     5. Age less than 65        0     6. Age 72 or greater 1     7. Medication: diuretics, strong analgesics, hypnotics, sedatives,        antihypertensive agents 0   8.   Falls:

## 2023-05-29 RX ORDER — LOSARTAN POTASSIUM 50 MG/1
50 TABLET ORAL DAILY
Qty: 30 TABLET | Refills: 2 | Status: SHIPPED | OUTPATIENT
Start: 2023-05-29

## 2023-07-15 DIAGNOSIS — F33.0 MAJOR DEPRESSIVE DISORDER, RECURRENT, MILD (HCC): ICD-10-CM

## 2023-07-17 RX ORDER — ESCITALOPRAM OXALATE 20 MG/1
TABLET ORAL
Qty: 90 TABLET | Refills: 1 | Status: SHIPPED | OUTPATIENT
Start: 2023-07-17

## 2023-07-17 NOTE — TELEPHONE ENCOUNTER
Sulma Bills is calling to request a refill on the following medication(s):    Medication Request:  Requested Prescriptions     Pending Prescriptions Disp Refills    escitalopram (LEXAPRO) 20 MG tablet [Pharmacy Med Name: ESCITALOPRAM 20 MG TABLET] 90 tablet 1     Sig: take 1 tablet by mouth once daily       Last Visit Date (If Applicable):  1/2/5892    Next Visit Date:    10/10/2023

## 2023-08-03 DIAGNOSIS — J45.20 MILD INTERMITTENT ASTHMA WITHOUT COMPLICATION: ICD-10-CM

## 2023-08-04 NOTE — TELEPHONE ENCOUNTER
Isaac Ortiz is calling to request a refill on the following medication(s):    Medication Request:  Requested Prescriptions     Pending Prescriptions Disp Refills    albuterol sulfate HFA (PROVENTIL;VENTOLIN;PROAIR) 108 (90 Base) MCG/ACT inhaler [Pharmacy Med Name: ALBUTEROL HFA 90 MCG INHALER] 8.5 g 5     Sig: inhale 2 puffs by mouth and INTO THE LUNGS every 4 hours if needed for wheezing       Last Visit Date (If Applicable):  4/4/0696    Next Visit Date:    10/10/2023

## 2023-08-06 RX ORDER — ALBUTEROL SULFATE 90 UG/1
AEROSOL, METERED RESPIRATORY (INHALATION)
Qty: 8.5 G | Refills: 5 | Status: SHIPPED | OUTPATIENT
Start: 2023-08-06

## 2023-09-28 NOTE — TELEPHONE ENCOUNTER
Sallie Mcguire is calling to request a refill on the following medication(s):    Medication Request:  Requested Prescriptions     Pending Prescriptions Disp Refills    losartan (COZAAR) 50 MG tablet 30 tablet 2     Sig: Take 1 tablet by mouth daily       Last Visit Date (If Applicable):  5/9/5468    Next Visit Date:    10/10/2023

## 2023-09-29 RX ORDER — LOSARTAN POTASSIUM 50 MG/1
50 TABLET ORAL DAILY
Qty: 30 TABLET | Refills: 2 | Status: SHIPPED | OUTPATIENT
Start: 2023-09-29

## 2023-10-18 ENCOUNTER — TELEPHONE (OUTPATIENT)
Dept: FAMILY MEDICINE CLINIC | Age: 68
End: 2023-10-18

## 2023-10-23 DIAGNOSIS — J44.9 CHRONIC OBSTRUCTIVE PULMONARY DISEASE, UNSPECIFIED COPD TYPE (HCC): ICD-10-CM

## 2023-10-24 RX ORDER — FLUTICASONE FUROATE, UMECLIDINIUM BROMIDE AND VILANTEROL TRIFENATATE 100; 62.5; 25 UG/1; UG/1; UG/1
POWDER RESPIRATORY (INHALATION)
Qty: 180 EACH | Refills: 1 | Status: SHIPPED | OUTPATIENT
Start: 2023-10-24

## 2023-10-24 NOTE — TELEPHONE ENCOUNTER
Beba Mcmillan is calling to request a refill on the following medication(s):    Medication Request:  Requested Prescriptions     Pending Prescriptions Disp Refills    Mejiakami Kaba 100-62.5-25 MCG/ACT AEPB inhaler [Pharmacy Med Name: Mejia Kaba 100-62.5-25]       Sig: inhale 1 puff by mouth and INTO THE LUNGS once daily       Last Visit Date (If Applicable):  1/5/3915    Next Visit Date:    Visit date not found

## 2023-11-14 ENCOUNTER — TELEPHONE (OUTPATIENT)
Dept: FAMILY MEDICINE CLINIC | Age: 68
End: 2023-11-14

## 2024-02-11 DIAGNOSIS — J45.20 MILD INTERMITTENT ASTHMA WITHOUT COMPLICATION: ICD-10-CM

## 2024-02-12 RX ORDER — ALBUTEROL SULFATE 90 UG/1
AEROSOL, METERED RESPIRATORY (INHALATION)
Qty: 8.5 G | Refills: 5 | Status: SHIPPED | OUTPATIENT
Start: 2024-02-12

## 2024-02-12 NOTE — TELEPHONE ENCOUNTER
Sukh Piña is calling to request a refill on the following medication(s):    Medication Request:  Requested Prescriptions     Pending Prescriptions Disp Refills    albuterol sulfate HFA (PROVENTIL;VENTOLIN;PROAIR) 108 (90 Base) MCG/ACT inhaler [Pharmacy Med Name: ALBUTEROL HFA 90 MCG INHALER] 8.5 g 5     Sig: inhale 2 puffs by mouth and INTO THE LUNGS every 4 hours if needed for wheezing       Last Visit Date (If Applicable):  4/7/2023    Next Visit Date:    Visit date not found

## 2024-02-14 ENCOUNTER — TELEPHONE (OUTPATIENT)
Dept: FAMILY MEDICINE CLINIC | Age: 69
End: 2024-02-14

## 2024-02-14 NOTE — TELEPHONE ENCOUNTER
Available without authorization. The member is able to fill the requested drug at the pharmacy. If coverage is still needed or requesting prior to the expiration of a current authorization, a request can be made by sending a fax or calling the number on the back of the member's ID card.

## 2024-03-05 NOTE — ASSESSMENT & PLAN NOTE
Mildly controlled  Taking Escitalopram 10mg daily b/c 20mg gives him tremors and SEs  Add Wellbutrin XL 150mg daily
Stable
Her/She

## 2024-03-11 DIAGNOSIS — F33.0 MAJOR DEPRESSIVE DISORDER, RECURRENT, MILD (HCC): ICD-10-CM

## 2024-03-12 ENCOUNTER — TELEPHONE (OUTPATIENT)
Dept: FAMILY MEDICINE CLINIC | Age: 69
End: 2024-03-12

## 2024-03-12 RX ORDER — ESCITALOPRAM OXALATE 20 MG/1
TABLET ORAL
Qty: 90 TABLET | Refills: 1 | Status: SHIPPED | OUTPATIENT
Start: 2024-03-12

## 2024-03-12 NOTE — TELEPHONE ENCOUNTER
Sukh Piña is calling to request a refill on the following medication(s):    Last Visit Date (If Applicable):  4/7/2023    Next Visit Date:    Left voicemail to schedule a follow up appt.    Medication Request:  Requested Prescriptions     Pending Prescriptions Disp Refills    escitalopram (LEXAPRO) 20 MG tablet [Pharmacy Med Name: ESCITALOPRAM 20 MG TABLET] 90 tablet 1     Sig: take 1 tablet by mouth once daily

## 2024-03-20 ENCOUNTER — TELEPHONE (OUTPATIENT)
Dept: FAMILY MEDICINE CLINIC | Age: 69
End: 2024-03-20

## 2024-03-20 NOTE — TELEPHONE ENCOUNTER
EMERITA HUTTON Key: [id number]  Need help? Call us at [phone]  Status   [facility] to [facility]

## 2024-04-03 ENCOUNTER — TELEPHONE (OUTPATIENT)
Dept: FAMILY MEDICINE CLINIC | Age: 69
End: 2024-04-03

## 2024-06-18 ENCOUNTER — TELEPHONE (OUTPATIENT)
Dept: FAMILY MEDICINE CLINIC | Age: 69
End: 2024-06-18

## 2024-06-18 NOTE — TELEPHONE ENCOUNTER
Called patient today and left a voice message to call us and schedule his AMW visit.  His last MWV was  in June 25, 2021.

## 2024-06-19 ENCOUNTER — OFFICE VISIT (OUTPATIENT)
Dept: FAMILY MEDICINE CLINIC | Age: 69
End: 2024-06-19

## 2024-06-19 VITALS
OXYGEN SATURATION: 98 % | BODY MASS INDEX: 28.74 KG/M2 | SYSTOLIC BLOOD PRESSURE: 182 MMHG | HEART RATE: 59 BPM | DIASTOLIC BLOOD PRESSURE: 78 MMHG | WEIGHT: 189 LBS

## 2024-06-19 DIAGNOSIS — I10 PRIMARY HYPERTENSION: Primary | ICD-10-CM

## 2024-06-19 DIAGNOSIS — R06.02 SOBOE (SHORTNESS OF BREATH ON EXERTION): ICD-10-CM

## 2024-06-19 DIAGNOSIS — F33.0 MAJOR DEPRESSIVE DISORDER, RECURRENT, MILD (HCC): ICD-10-CM

## 2024-06-19 DIAGNOSIS — J44.9 CHRONIC OBSTRUCTIVE PULMONARY DISEASE, UNSPECIFIED COPD TYPE (HCC): ICD-10-CM

## 2024-06-19 DIAGNOSIS — R07.9 CHEST PAIN, UNSPECIFIED TYPE: ICD-10-CM

## 2024-06-19 PROBLEM — C61 MALIGNANT NEOPLASM OF PROSTATE (HCC): Status: RESOLVED | Noted: 2024-06-19 | Resolved: 2024-06-19

## 2024-06-19 PROBLEM — C61 MALIGNANT NEOPLASM OF PROSTATE (HCC): Status: ACTIVE | Noted: 2024-06-19

## 2024-06-19 RX ORDER — LOSARTAN POTASSIUM 100 MG/1
100 TABLET ORAL DAILY
Qty: 90 TABLET | Refills: 1 | Status: SHIPPED | OUTPATIENT
Start: 2024-06-19

## 2024-06-19 SDOH — ECONOMIC STABILITY: FOOD INSECURITY: WITHIN THE PAST 12 MONTHS, YOU WORRIED THAT YOUR FOOD WOULD RUN OUT BEFORE YOU GOT MONEY TO BUY MORE.: NEVER TRUE

## 2024-06-19 SDOH — ECONOMIC STABILITY: FOOD INSECURITY: WITHIN THE PAST 12 MONTHS, THE FOOD YOU BOUGHT JUST DIDN'T LAST AND YOU DIDN'T HAVE MONEY TO GET MORE.: NEVER TRUE

## 2024-06-19 SDOH — ECONOMIC STABILITY: INCOME INSECURITY: HOW HARD IS IT FOR YOU TO PAY FOR THE VERY BASICS LIKE FOOD, HOUSING, MEDICAL CARE, AND HEATING?: NOT HARD AT ALL

## 2024-06-19 ASSESSMENT — PATIENT HEALTH QUESTIONNAIRE - PHQ9
SUM OF ALL RESPONSES TO PHQ9 QUESTIONS 1 & 2: 0
5. POOR APPETITE OR OVEREATING: NOT AT ALL
8. MOVING OR SPEAKING SO SLOWLY THAT OTHER PEOPLE COULD HAVE NOTICED. OR THE OPPOSITE, BEING SO FIGETY OR RESTLESS THAT YOU HAVE BEEN MOVING AROUND A LOT MORE THAN USUAL: NOT AT ALL
4. FEELING TIRED OR HAVING LITTLE ENERGY: NOT AT ALL
SUM OF ALL RESPONSES TO PHQ QUESTIONS 1-9: 0
3. TROUBLE FALLING OR STAYING ASLEEP: NOT AT ALL
2. FEELING DOWN, DEPRESSED OR HOPELESS: NOT AT ALL
7. TROUBLE CONCENTRATING ON THINGS, SUCH AS READING THE NEWSPAPER OR WATCHING TELEVISION: NOT AT ALL
SUM OF ALL RESPONSES TO PHQ QUESTIONS 1-9: 0
6. FEELING BAD ABOUT YOURSELF - OR THAT YOU ARE A FAILURE OR HAVE LET YOURSELF OR YOUR FAMILY DOWN: NOT AT ALL
SUM OF ALL RESPONSES TO PHQ QUESTIONS 1-9: 0
9. THOUGHTS THAT YOU WOULD BE BETTER OFF DEAD, OR OF HURTING YOURSELF: NOT AT ALL
SUM OF ALL RESPONSES TO PHQ QUESTIONS 1-9: 0
1. LITTLE INTEREST OR PLEASURE IN DOING THINGS: NOT AT ALL
10. IF YOU CHECKED OFF ANY PROBLEMS, HOW DIFFICULT HAVE THESE PROBLEMS MADE IT FOR YOU TO DO YOUR WORK, TAKE CARE OF THINGS AT HOME, OR GET ALONG WITH OTHER PEOPLE: NOT DIFFICULT AT ALL

## 2024-06-19 ASSESSMENT — ENCOUNTER SYMPTOMS
DIFFICULTY BREATHING: 1
COUGH: 0
ORTHOPNEA: 1
HOARSE VOICE: 0
VOMITING: 0
SHORTNESS OF BREATH: 1
FREQUENT THROAT CLEARING: 0
RHINORRHEA: 0
WHEEZING: 1
SORE THROAT: 0
EYES NEGATIVE: 1
SPUTUM PRODUCTION: 0
HYPERVENTILATION: 0
SWOLLEN GLANDS: 0
HEMOPTYSIS: 0
ALLERGIC/IMMUNOLOGIC NEGATIVE: 1
CHOKING: 0
ABDOMINAL PAIN: 0
CHEST TIGHTNESS: 1
GASTROINTESTINAL NEGATIVE: 1
BLURRED VISION: 0

## 2024-06-19 ASSESSMENT — COPD QUESTIONNAIRES: COPD: 1

## 2024-06-19 NOTE — ASSESSMENT & PLAN NOTE
Uncontrolled, changes made today: increase losartan to 100mg, medication adherence emphasized, and lifestyle modifications recommended

## 2024-06-19 NOTE — PROGRESS NOTES
24 Hours:  PET CT TUMOR IMAGE SKULL THIGH PSMA  Narrative: EXAMINATION:  PROSTATE PET SKULL BASE TO MID THIGHS    11/8/2022    TECHNIQUE:  9.6 mCi F-18 piflufolastat (Pylarify) injected intravenously.    PET imaging was obtained from skull vertex to the mid thigh.  Computed  tomography was used for attenuation correction and localization. Fusion  imaging was obtained.    Uptake time 64 mins.    COMPARISON:  None    HISTORY:  ORDERING SYSTEM PROVIDED HISTORY: Prostate cancer (HCC)  TECHNOLOGIST PROVIDED HISTORY:  Reason for Exam: Prostate cancer (HCC) C61 (ICD-10-CM)    FINDINGS:  PROSTATE/PROSTATE BED: No focal activity at the prostatectomy site.    LYMPH NODES: No PSMA avid lymphadenopathy.  No enlarged lymph nodes.    BONES: No focal PSMA activity within the bones.  No aggressive osseous  lesions.    OTHER PET FINDINGS: Physiologic activity within the salivary glands, liver,  spleen, kidneys, and bowel.    OTHER INCIDENTAL CT FINDINGS: Scarring in the lower right lung.  Atherosclerotic calcifications within the coronary arteries and the aorta and  its branches.  Moderate hiatal hernia.  Cholelithiasis.  Colonic  diverticulosis without evidence of acute diverticulitis.  Impression: No PSMA avid recurrent or metastatic disease.

## 2024-06-19 NOTE — ASSESSMENT & PLAN NOTE
Borderline controlled, continue current medications, continue current treatment plan, and medication adherence emphasized  Unlikely current acute issues from COPD exacerbation as lungs are clear

## 2024-06-20 ENCOUNTER — HOSPITAL ENCOUNTER (OUTPATIENT)
Dept: GENERAL RADIOLOGY | Age: 69
Discharge: HOME | End: 2024-06-22
Payer: MEDICARE

## 2024-06-20 ENCOUNTER — HOSPITAL ENCOUNTER (OUTPATIENT)
Age: 69
Discharge: HOME | End: 2024-06-22
Payer: MEDICARE

## 2024-06-20 DIAGNOSIS — R06.02 SOBOE (SHORTNESS OF BREATH ON EXERTION): ICD-10-CM

## 2024-06-20 DIAGNOSIS — R07.9 CHEST PAIN, UNSPECIFIED TYPE: ICD-10-CM

## 2024-06-20 LAB
ALBUMIN SERPL-MCNC: 4.5 G/DL (ref 3.5–5.2)
ALP SERPL-CCNC: 85 U/L (ref 40–129)
ALT SERPL-CCNC: 15 U/L (ref 5–41)
ANION GAP SERPL CALCULATED.3IONS-SCNC: 14 MMOL/L (ref 9–17)
AST SERPL-CCNC: 19 U/L
BASOPHILS # BLD: <0.03 K/UL (ref 0–0.2)
BASOPHILS NFR BLD: 0 % (ref 0–2)
BILIRUB SERPL-MCNC: 0.5 MG/DL (ref 0.3–1.2)
BNP SERPL-MCNC: 165 PG/ML
BUN SERPL-MCNC: 14 MG/DL (ref 8–23)
BUN/CREAT SERPL: 18 (ref 9–20)
CALCIUM SERPL-MCNC: 9.4 MG/DL (ref 8.6–10.4)
CHLORIDE SERPL-SCNC: 105 MMOL/L (ref 98–107)
CO2 SERPL-SCNC: 23 MMOL/L (ref 20–31)
CREAT SERPL-MCNC: 0.8 MG/DL (ref 0.7–1.2)
EOSINOPHIL # BLD: 0.11 K/UL (ref 0–0.44)
EOSINOPHILS RELATIVE PERCENT: 2 % (ref 1–4)
ERYTHROCYTE [DISTWIDTH] IN BLOOD BY AUTOMATED COUNT: 13.1 % (ref 11.8–14.4)
GFR, ESTIMATED: >90 ML/MIN/1.73M2
GLUCOSE SERPL-MCNC: 88 MG/DL (ref 70–99)
HCT VFR BLD AUTO: 43.2 % (ref 40.7–50.3)
HGB BLD-MCNC: 15 G/DL (ref 13–17)
IMM GRANULOCYTES # BLD AUTO: 0.01 K/UL (ref 0–0.3)
IMM GRANULOCYTES NFR BLD: 0 %
LYMPHOCYTES NFR BLD: 1.28 K/UL (ref 1.1–3.7)
LYMPHOCYTES RELATIVE PERCENT: 27 % (ref 24–43)
MCH RBC QN AUTO: 37.6 PG (ref 25.2–33.5)
MCHC RBC AUTO-ENTMCNC: 34.7 G/DL (ref 28.4–34.8)
MCV RBC AUTO: 108.3 FL (ref 82.6–102.9)
MONOCYTES NFR BLD: 0.34 K/UL (ref 0.1–1.2)
MONOCYTES NFR BLD: 7 % (ref 3–12)
NEUTROPHILS NFR BLD: 64 % (ref 36–65)
NEUTS SEG NFR BLD: 3.02 K/UL (ref 1.5–8.1)
NRBC BLD-RTO: 0 PER 100 WBC
PLATELET # BLD AUTO: 142 K/UL (ref 138–453)
PMV BLD AUTO: 9.6 FL (ref 8.1–13.5)
POTASSIUM SERPL-SCNC: 4.1 MMOL/L (ref 3.7–5.3)
PROT SERPL-MCNC: 7.8 G/DL (ref 6.4–8.3)
RBC # BLD AUTO: 3.99 M/UL (ref 4.21–5.77)
RBC # BLD: ABNORMAL 10*6/UL
SODIUM SERPL-SCNC: 142 MMOL/L (ref 135–144)
WBC OTHER # BLD: 4.8 K/UL (ref 3.5–11.3)

## 2024-06-20 PROCEDURE — 83880 ASSAY OF NATRIURETIC PEPTIDE: CPT

## 2024-06-20 PROCEDURE — 71046 X-RAY EXAM CHEST 2 VIEWS: CPT

## 2024-06-20 PROCEDURE — 80053 COMPREHEN METABOLIC PANEL: CPT

## 2024-06-20 PROCEDURE — 85025 COMPLETE CBC W/AUTO DIFF WBC: CPT

## 2024-06-20 PROCEDURE — 36415 COLL VENOUS BLD VENIPUNCTURE: CPT

## 2024-06-27 DIAGNOSIS — J44.9 CHRONIC OBSTRUCTIVE PULMONARY DISEASE, UNSPECIFIED COPD TYPE (HCC): ICD-10-CM

## 2024-06-27 RX ORDER — FLUTICASONE FUROATE, UMECLIDINIUM BROMIDE AND VILANTEROL TRIFENATATE 100; 62.5; 25 UG/1; UG/1; UG/1
POWDER RESPIRATORY (INHALATION)
Qty: 180 EACH | Refills: 1 | Status: SHIPPED | OUTPATIENT
Start: 2024-06-27

## 2024-06-27 NOTE — TELEPHONE ENCOUNTER
Sukh Piña is calling to request a refill on the following medication(s):    Medication Request:  Requested Prescriptions     Pending Prescriptions Disp Refills    TRELEGY ELLIPTA 100-62.5-25 MCG/ACT AEPB inhaler [Pharmacy Med Name: TRELEGY ELLIPTA 100-62.5-25]       Sig: inhale 1 puff by mouth and INTO THE LUNGS once daily       Last Visit Date (If Applicable):  6/19/2024    Next Visit Date:    12/4/2024

## 2024-08-01 DIAGNOSIS — J45.20 MILD INTERMITTENT ASTHMA WITHOUT COMPLICATION: ICD-10-CM

## 2024-08-02 NOTE — TELEPHONE ENCOUNTER
Sukh Piña is calling to request a refill on the following medication(s):    Medication Request:  Requested Prescriptions     Pending Prescriptions Disp Refills    albuterol sulfate HFA (PROVENTIL;VENTOLIN;PROAIR) 108 (90 Base) MCG/ACT inhaler [Pharmacy Med Name: ALBUTEROL HFA INH (200 PUFFS) 8.5GM] 8.5 g 5     Sig: INHALE 2 PUFFS BY MOUTH AND INTO LUNGS EVERY 4 HOURS AS NEEDED FOR WHEEZING       Last Visit Date (If Applicable):  6/19/2024    Next Visit Date:    12/4/2024

## 2024-08-03 RX ORDER — ALBUTEROL SULFATE 90 UG/1
AEROSOL, METERED RESPIRATORY (INHALATION)
Qty: 8.5 G | Refills: 5 | Status: SHIPPED | OUTPATIENT
Start: 2024-08-03

## 2024-11-01 DIAGNOSIS — F33.0 MAJOR DEPRESSIVE DISORDER, RECURRENT, MILD (HCC): ICD-10-CM

## 2024-11-04 RX ORDER — ESCITALOPRAM OXALATE 20 MG/1
20 TABLET ORAL DAILY
Qty: 30 TABLET | Refills: 0 | Status: SHIPPED | OUTPATIENT
Start: 2024-11-04

## 2024-11-04 NOTE — TELEPHONE ENCOUNTER
Sukh Piña is calling to request a refill on the following medication(s):    Medication Request:  Requested Prescriptions     Pending Prescriptions Disp Refills    escitalopram (LEXAPRO) 20 MG tablet [Pharmacy Med Name: ESCITALOPRAM 20MG TABLETS] 90 tablet 1     Sig: TAKE 1 TABLET BY MOUTH EVERY DAY       Last Visit Date (If Applicable):  6/19/2024    Next Visit Date:    12/4/2024

## 2024-12-11 DIAGNOSIS — F33.0 MAJOR DEPRESSIVE DISORDER, RECURRENT, MILD (HCC): ICD-10-CM

## 2024-12-11 RX ORDER — ESCITALOPRAM OXALATE 20 MG/1
20 TABLET ORAL DAILY
Qty: 30 TABLET | Refills: 0 | Status: SHIPPED | OUTPATIENT
Start: 2024-12-11

## 2024-12-11 NOTE — TELEPHONE ENCOUNTER
Sukh Piña is calling to request a refill on the following medication(s):    Medication Request:  Requested Prescriptions     Pending Prescriptions Disp Refills    escitalopram (LEXAPRO) 20 MG tablet [Pharmacy Med Name: ESCITALOPRAM 20MG TABLETS] 30 tablet 0     Sig: TAKE 1 TABLET BY MOUTH EVERY DAY       Last Visit Date (If Applicable):  Visit date not found    Next Visit Date:    Visit date not found

## 2025-01-10 DIAGNOSIS — F33.0 MAJOR DEPRESSIVE DISORDER, RECURRENT, MILD (HCC): ICD-10-CM

## 2025-01-10 NOTE — TELEPHONE ENCOUNTER
Sukh Piña is calling to request a refill on the following medication(s):    Medication Request:  Requested Prescriptions     Pending Prescriptions Disp Refills    escitalopram (LEXAPRO) 20 MG tablet 30 tablet 0     Sig: Take 1 tablet by mouth daily       Last Visit Date (If Applicable):  Visit date not found    Next Visit Date:    5/20/2025    Last refilled on 12/11/24. Prescription pending.

## 2025-01-11 RX ORDER — ESCITALOPRAM OXALATE 20 MG/1
20 TABLET ORAL DAILY
Qty: 30 TABLET | Refills: 0 | Status: SHIPPED | OUTPATIENT
Start: 2025-01-11

## 2025-02-06 DIAGNOSIS — J45.20 MILD INTERMITTENT ASTHMA WITHOUT COMPLICATION: ICD-10-CM

## 2025-02-06 RX ORDER — ALBUTEROL SULFATE 90 UG/1
INHALANT RESPIRATORY (INHALATION)
Qty: 8.5 G | Refills: 5 | Status: SHIPPED | OUTPATIENT
Start: 2025-02-06

## 2025-02-06 NOTE — TELEPHONE ENCOUNTER
Sukh Piña is calling to request a refill on the following medication(s):    Medication Request:  Requested Prescriptions     Pending Prescriptions Disp Refills    albuterol sulfate HFA (PROVENTIL;VENTOLIN;PROAIR) 108 (90 Base) MCG/ACT inhaler 8.5 g 5     Sig: INHALE 2 PUFFS BY MOUTH AND INTO LUNGS EVERY 4 HOURS AS NEEDED FOR WHEEZING       Last Visit Date (If Applicable):  Visit date not found    Next Visit Date:    5/20/2025

## 2025-03-13 DIAGNOSIS — I10 PRIMARY HYPERTENSION: ICD-10-CM

## 2025-03-13 RX ORDER — LOSARTAN POTASSIUM 100 MG/1
100 TABLET ORAL DAILY
Qty: 90 TABLET | Refills: 0 | Status: SHIPPED | OUTPATIENT
Start: 2025-03-13

## 2025-03-13 NOTE — TELEPHONE ENCOUNTER
Sukh Piña is calling to request a refill on the following medication(s):    Medication Request:  Requested Prescriptions     Pending Prescriptions Disp Refills    losartan (COZAAR) 100 MG tablet 90 tablet 1     Sig: Take 1 tablet by mouth daily       Last Visit Date (If Applicable):  Visit date not found    Next Visit Date:    5/20/2025

## 2025-04-28 DIAGNOSIS — F33.0 MAJOR DEPRESSIVE DISORDER, RECURRENT, MILD: ICD-10-CM

## 2025-04-28 RX ORDER — ESCITALOPRAM OXALATE 20 MG/1
20 TABLET ORAL DAILY
Qty: 30 TABLET | Refills: 0 | Status: SHIPPED | OUTPATIENT
Start: 2025-04-28

## 2025-04-28 NOTE — TELEPHONE ENCOUNTER
Patient will be establishing with you 5/20/2025 30 day pended   Sukh Piña is calling to request a refill on the following medication(s):    Medication Request:  Requested Prescriptions     Pending Prescriptions Disp Refills    escitalopram (LEXAPRO) 20 MG tablet 30 tablet 0     Sig: Take 1 tablet by mouth daily       Last Visit Date (If Applicable):  Visit date not found    Next Visit Date:    5/20/2025

## 2025-05-20 ENCOUNTER — HOSPITAL ENCOUNTER (OUTPATIENT)
Age: 70
Setting detail: SPECIMEN
Discharge: HOME | End: 2025-05-20

## 2025-05-20 ENCOUNTER — OFFICE VISIT (OUTPATIENT)
Dept: FAMILY MEDICINE CLINIC | Age: 70
End: 2025-05-20

## 2025-05-20 VITALS
TEMPERATURE: 97.7 F | DIASTOLIC BLOOD PRESSURE: 62 MMHG | RESPIRATION RATE: 12 BRPM | SYSTOLIC BLOOD PRESSURE: 118 MMHG | WEIGHT: 177.2 LBS | HEART RATE: 57 BPM | BODY MASS INDEX: 26.86 KG/M2 | HEIGHT: 68 IN | OXYGEN SATURATION: 96 %

## 2025-05-20 DIAGNOSIS — E78.5 DYSLIPIDEMIA: ICD-10-CM

## 2025-05-20 DIAGNOSIS — I10 BENIGN ESSENTIAL HTN: ICD-10-CM

## 2025-05-20 DIAGNOSIS — N52.01 ERECTILE DYSFUNCTION DUE TO ARTERIAL INSUFFICIENCY: ICD-10-CM

## 2025-05-20 DIAGNOSIS — F33.0 MAJOR DEPRESSIVE DISORDER, RECURRENT, MILD: ICD-10-CM

## 2025-05-20 DIAGNOSIS — Z76.89 ENCOUNTER TO ESTABLISH CARE WITH NEW PROVIDER: Primary | ICD-10-CM

## 2025-05-20 DIAGNOSIS — I10 PRIMARY HYPERTENSION: ICD-10-CM

## 2025-05-20 DIAGNOSIS — B19.21 HEPATITIS C VIRUS INFECTION WITH HEPATIC COMA, UNSPECIFIED CHRONICITY: ICD-10-CM

## 2025-05-20 DIAGNOSIS — J30.1 NON-SEASONAL ALLERGIC RHINITIS DUE TO POLLEN: ICD-10-CM

## 2025-05-20 DIAGNOSIS — Z85.46 HISTORY OF PROSTATE CANCER: ICD-10-CM

## 2025-05-20 DIAGNOSIS — J44.9 CHRONIC OBSTRUCTIVE PULMONARY DISEASE, UNSPECIFIED COPD TYPE (HCC): ICD-10-CM

## 2025-05-20 PROBLEM — M54.12 RIGHT CERVICAL RADICULOPATHY: Status: RESOLVED | Noted: 2022-10-07 | Resolved: 2025-05-20

## 2025-05-20 PROBLEM — F17.200 NICOTINE DEPENDENCE, UNCOMPLICATED: Status: RESOLVED | Noted: 2021-09-30 | Resolved: 2025-05-20

## 2025-05-20 LAB
ALBUMIN SERPL-MCNC: 4.6 G/DL (ref 3.5–5.2)
ALBUMIN/GLOB SERPL: 1.4 {RATIO} (ref 1–2.5)
ALP SERPL-CCNC: 74 U/L (ref 40–129)
ALT SERPL-CCNC: 15 U/L (ref 10–50)
ANION GAP SERPL CALCULATED.3IONS-SCNC: 11 MMOL/L (ref 9–16)
AST SERPL-CCNC: 22 U/L (ref 10–50)
BILIRUB SERPL-MCNC: 0.7 MG/DL (ref 0–1.2)
BILIRUB UR QL STRIP: NEGATIVE
BUN SERPL-MCNC: 22 MG/DL (ref 8–23)
CALCIUM SERPL-MCNC: 9.7 MG/DL (ref 8.6–10.4)
CHLORIDE SERPL-SCNC: 102 MMOL/L (ref 98–107)
CHOLEST SERPL-MCNC: 235 MG/DL (ref 0–199)
CHOLESTEROL/HDL RATIO: 4.3
CLARITY UR: CLEAR
CO2 SERPL-SCNC: 27 MMOL/L (ref 20–31)
COLOR UR: YELLOW
COMMENT: NORMAL
CREAT SERPL-MCNC: 1 MG/DL (ref 0.7–1.2)
ERYTHROCYTE [DISTWIDTH] IN BLOOD BY AUTOMATED COUNT: 12.2 % (ref 11.8–14.4)
GFR, ESTIMATED: 81 ML/MIN/1.73M2
GLUCOSE SERPL-MCNC: 106 MG/DL (ref 74–99)
GLUCOSE UR STRIP-MCNC: NEGATIVE MG/DL
HCT VFR BLD AUTO: 44 % (ref 40.7–50.3)
HDLC SERPL-MCNC: 55 MG/DL
HGB BLD-MCNC: 14.9 G/DL (ref 13–17)
HGB UR QL STRIP.AUTO: NEGATIVE
KETONES UR STRIP-MCNC: NEGATIVE MG/DL
LDLC SERPL CALC-MCNC: 150 MG/DL (ref 0–100)
LEUKOCYTE ESTERASE UR QL STRIP: NEGATIVE
MCH RBC QN AUTO: 36.4 PG (ref 25.2–33.5)
MCHC RBC AUTO-ENTMCNC: 33.9 G/DL (ref 28.4–34.8)
MCV RBC AUTO: 107.6 FL (ref 82.6–102.9)
NITRITE UR QL STRIP: NEGATIVE
NRBC BLD-RTO: 0 PER 100 WBC
PH UR STRIP: 5 [PH] (ref 5–8)
PLATELET # BLD AUTO: 178 K/UL (ref 138–453)
PMV BLD AUTO: 10.3 FL (ref 8.1–13.5)
POTASSIUM SERPL-SCNC: 4.5 MMOL/L (ref 3.7–5.3)
PROT SERPL-MCNC: 7.8 G/DL (ref 6.6–8.7)
PROT UR STRIP-MCNC: NEGATIVE MG/DL
RBC # BLD AUTO: 4.09 M/UL (ref 4.21–5.77)
SODIUM SERPL-SCNC: 140 MMOL/L (ref 136–145)
SP GR UR STRIP: 1.01 (ref 1–1.03)
TRIGL SERPL-MCNC: 151 MG/DL
TSH SERPL DL<=0.05 MIU/L-ACNC: 2.82 UIU/ML (ref 0.27–4.2)
UROBILINOGEN UR STRIP-ACNC: NORMAL EU/DL (ref 0–1)
VLDLC SERPL CALC-MCNC: 30 MG/DL (ref 1–30)
WBC OTHER # BLD: 5.2 K/UL (ref 3.5–11.3)

## 2025-05-20 RX ORDER — FLUTICASONE PROPIONATE 50 MCG
1 SPRAY, SUSPENSION (ML) NASAL DAILY
Qty: 32 G | Refills: 1 | Status: SHIPPED | OUTPATIENT
Start: 2025-05-20

## 2025-05-20 RX ORDER — LOSARTAN POTASSIUM 100 MG/1
100 TABLET ORAL DAILY
Qty: 90 TABLET | Refills: 1 | Status: SHIPPED | OUTPATIENT
Start: 2025-05-20

## 2025-05-20 RX ORDER — FLUTICASONE PROPIONATE AND SALMETEROL XINAFOATE 230; 21 UG/1; UG/1
2 AEROSOL, METERED RESPIRATORY (INHALATION) 2 TIMES DAILY
Qty: 2 EACH | Refills: 2 | Status: SHIPPED | OUTPATIENT
Start: 2025-05-20

## 2025-05-20 RX ORDER — ESCITALOPRAM OXALATE 20 MG/1
20 TABLET ORAL DAILY
Qty: 90 TABLET | Refills: 1 | Status: SHIPPED | OUTPATIENT
Start: 2025-05-20

## 2025-05-20 RX ORDER — LORATADINE 10 MG/1
10 TABLET ORAL DAILY
Qty: 90 TABLET | Refills: 0 | Status: SHIPPED | OUTPATIENT
Start: 2025-05-20

## 2025-05-20 SDOH — ECONOMIC STABILITY: FOOD INSECURITY: WITHIN THE PAST 12 MONTHS, YOU WORRIED THAT YOUR FOOD WOULD RUN OUT BEFORE YOU GOT MONEY TO BUY MORE.: NEVER TRUE

## 2025-05-20 SDOH — ECONOMIC STABILITY: FOOD INSECURITY: WITHIN THE PAST 12 MONTHS, THE FOOD YOU BOUGHT JUST DIDN'T LAST AND YOU DIDN'T HAVE MONEY TO GET MORE.: NEVER TRUE

## 2025-05-20 ASSESSMENT — PATIENT HEALTH QUESTIONNAIRE - PHQ9
3. TROUBLE FALLING OR STAYING ASLEEP: NOT AT ALL
9. THOUGHTS THAT YOU WOULD BE BETTER OFF DEAD, OR OF HURTING YOURSELF: NOT AT ALL
5. POOR APPETITE OR OVEREATING: NOT AT ALL
SUM OF ALL RESPONSES TO PHQ QUESTIONS 1-9: 0
8. MOVING OR SPEAKING SO SLOWLY THAT OTHER PEOPLE COULD HAVE NOTICED. OR THE OPPOSITE, BEING SO FIGETY OR RESTLESS THAT YOU HAVE BEEN MOVING AROUND A LOT MORE THAN USUAL: NOT AT ALL
1. LITTLE INTEREST OR PLEASURE IN DOING THINGS: NOT AT ALL
2. FEELING DOWN, DEPRESSED OR HOPELESS: NOT AT ALL
SUM OF ALL RESPONSES TO PHQ QUESTIONS 1-9: 0
4. FEELING TIRED OR HAVING LITTLE ENERGY: NOT AT ALL
7. TROUBLE CONCENTRATING ON THINGS, SUCH AS READING THE NEWSPAPER OR WATCHING TELEVISION: NOT AT ALL
SUM OF ALL RESPONSES TO PHQ QUESTIONS 1-9: 0
10. IF YOU CHECKED OFF ANY PROBLEMS, HOW DIFFICULT HAVE THESE PROBLEMS MADE IT FOR YOU TO DO YOUR WORK, TAKE CARE OF THINGS AT HOME, OR GET ALONG WITH OTHER PEOPLE: NOT DIFFICULT AT ALL
SUM OF ALL RESPONSES TO PHQ QUESTIONS 1-9: 0
6. FEELING BAD ABOUT YOURSELF - OR THAT YOU ARE A FAILURE OR HAVE LET YOURSELF OR YOUR FAMILY DOWN: NOT AT ALL

## 2025-05-20 ASSESSMENT — ENCOUNTER SYMPTOMS
BACK PAIN: 1
SHORTNESS OF BREATH: 1
ALLERGIC/IMMUNOLOGIC NEGATIVE: 1
WHEEZING: 1
EYES NEGATIVE: 1
GASTROINTESTINAL NEGATIVE: 1
COUGH: 1

## 2025-05-20 ASSESSMENT — COPD QUESTIONNAIRES: COPD: 1

## 2025-05-20 NOTE — PROGRESS NOTES
Subjective:      Patient ID: Sukh Piña is a 70 y.o. male.    COPD  He complains of cough, shortness of breath and wheezing. This is a chronic problem. The current episode started more than 1 month ago. The problem occurs daily. The problem has been waxing and waning. The cough is non-productive. His symptoms are aggravated by change in weather and pollen. His symptoms are alleviated by beta-agonist. He reports minimal improvement on treatment. Risk factors: Former smoker. His past medical history is significant for COPD.       Review of Systems   Constitutional: Negative.    HENT: Negative.     Eyes: Negative.    Respiratory:  Positive for cough, shortness of breath and wheezing.    Cardiovascular: Negative.    Gastrointestinal: Negative.    Endocrine: Negative.    Musculoskeletal:  Positive for arthralgias and back pain.   Skin: Negative.    Allergic/Immunologic: Negative.    Neurological: Negative.    Hematological: Negative.    Psychiatric/Behavioral:  Positive for dysphoric mood. The patient is hyperactive.    Past family and social history unremarkable.   Diagnosis Orders   1. Encounter to establish care with new provider        2. Major depressive disorder, recurrent, mild  escitalopram (LEXAPRO) 20 MG tablet    CBC    Comprehensive Metabolic Panel    Lipid Panel    Urinalysis    TSH      3. Primary hypertension  losartan (COZAAR) 100 MG tablet    CBC    Comprehensive Metabolic Panel    Lipid Panel    Urinalysis    TSH      4. Chronic obstructive pulmonary disease, unspecified COPD type (HCC)        5. Benign essential HTN  CBC    Comprehensive Metabolic Panel    Lipid Panel    Urinalysis    TSH      6. Non-seasonal allergic rhinitis due to pollen        7. Hepatitis C virus infection with hepatic coma, unspecified chronicity  CBC    Comprehensive Metabolic Panel    Lipid Panel    Urinalysis    TSH      8. Dyslipidemia  CBC    Comprehensive Metabolic Panel    Lipid Panel    Urinalysis    TSH      9.

## 2025-07-09 DIAGNOSIS — J45.20 MILD INTERMITTENT ASTHMA WITHOUT COMPLICATION: ICD-10-CM

## 2025-07-09 NOTE — TELEPHONE ENCOUNTER
Sukh Piña is calling to request a refill on the following medication(s):    Medication Request:  Requested Prescriptions     Pending Prescriptions Disp Refills    albuterol sulfate HFA (PROVENTIL;VENTOLIN;PROAIR) 108 (90 Base) MCG/ACT inhaler 8.5 g 5     Sig: INHALE 2 PUFFS BY MOUTH AND INTO LUNGS EVERY 4 HOURS AS NEEDED FOR WHEEZING       Last Visit Date (If Applicable):  5/20/2025    Next Visit Date:    11/20/2025    Last refilled on 2/6/25. Prescription pending.

## 2025-07-10 RX ORDER — ALBUTEROL SULFATE 90 UG/1
INHALANT RESPIRATORY (INHALATION)
Qty: 8.5 G | Refills: 5 | Status: SHIPPED | OUTPATIENT
Start: 2025-07-10

## 2025-07-16 NOTE — TELEPHONE ENCOUNTER
Phoenix Garg is calling to request a refill on the following medication(s):    Medication Request:  Requested Prescriptions     Pending Prescriptions Disp Refills    albuterol sulfate  (90 Base) MCG/ACT inhaler 18 g 0     Sig: INHALE TWO PUFFS BY MOUTH EVERY 4 HOURS AS NEEDED FOR WHEEZING       Last Visit Date (If Applicable):  8/75/8657    Next Visit Date:    Visit date not found Detail Level: Detailed General Sunscreen Counseling: I recommended a broad spectrum sunscreen with a SPF of 30 or higher.  I explained that SPF 30 sunscreens block approximately 97 percent of the sun's harmful rays.  Sunscreens should be applied at least 15 minutes prior to expected sun exposure and then every 2 hours after that as long as sun exposure continues. If swimming or exercising sunscreen should be reapplied every 45 minutes to an hour after getting wet or sweating.  One ounce, or the equivalent of a shot glass full of sunscreen, is adequate to protect the skin not covered by a bathing suit. I also recommended a lip balm with a sunscreen as well. Sun protective clothing can be used in lieu of sunscreen but must be worn the entire time you are exposed to the sun's rays.

## 2025-08-19 RX ORDER — LORATADINE 10 MG/1
10 TABLET ORAL DAILY
Qty: 90 TABLET | Refills: 0 | Status: SHIPPED | OUTPATIENT
Start: 2025-08-19

## (undated) DEVICE — BASIN EMSIS 700ML GRAPHITE PLAS KID SHP GRAD

## (undated) DEVICE — TUBING, SUCTION, 1/4" X 12', STRAIGHT: Brand: MEDLINE

## (undated) DEVICE — CUP MED 1OZ CLR POLYPR FEED GRAD W/O LID

## (undated) DEVICE — SNARE ENDOSCP M L240CM LOOP W27MM SHTH DIA2.4MM OVL FLX

## (undated) DEVICE — FORCEPS BX L240CM JAW DIA2.4MM ORNG L CAP W/ NDL DISP RAD

## (undated) DEVICE — GAUZE,SPONGE,4"X4",16PLY,STRL,LF,10/TRAY: Brand: MEDLINE

## (undated) DEVICE — Device: Brand: DEFENDO VALVE AND CONNECTOR KIT

## (undated) DEVICE — 9165 UNIVERSAL PATIENT PLATE: Brand: 3M™

## (undated) DEVICE — CO2 CANNULA,SUPERSOFT, ADLT,7'O2,7'CO2: Brand: MEDLINE

## (undated) DEVICE — SYRINGE MED 50ML LUERLOCK TIP

## (undated) DEVICE — TRAP SURG QUAD PARABOLA SLOT DSGN SFTY SCRN TRAPEASE